# Patient Record
Sex: FEMALE | Race: WHITE | NOT HISPANIC OR LATINO | Employment: FULL TIME | URBAN - NONMETROPOLITAN AREA
[De-identification: names, ages, dates, MRNs, and addresses within clinical notes are randomized per-mention and may not be internally consistent; named-entity substitution may affect disease eponyms.]

---

## 2018-05-25 ENCOUNTER — OFFICE VISIT CONVERTED (OUTPATIENT)
Dept: FAMILY MEDICINE CLINIC | Facility: CLINIC | Age: 33
End: 2018-05-25
Attending: NURSE PRACTITIONER

## 2019-04-02 ENCOUNTER — HOSPITAL ENCOUNTER (OUTPATIENT)
Dept: GENERAL RADIOLOGY | Facility: HOSPITAL | Age: 34
Discharge: HOME | End: 2019-04-02

## 2019-04-23 ENCOUNTER — HOSPITAL ENCOUNTER (OUTPATIENT)
Dept: GENERAL RADIOLOGY | Facility: HOSPITAL | Age: 34
Discharge: HOME | End: 2019-04-23

## 2019-05-16 ENCOUNTER — OFFICE VISIT CONVERTED (OUTPATIENT)
Dept: GASTROENTEROLOGY | Facility: CLINIC | Age: 34
End: 2019-05-16
Attending: INTERNAL MEDICINE

## 2019-07-03 ENCOUNTER — HOSPITAL ENCOUNTER (OUTPATIENT)
Dept: GASTROENTEROLOGY | Facility: HOSPITAL | Age: 34
Setting detail: HOSPITAL OUTPATIENT SURGERY
Discharge: HOME | End: 2019-07-03
Attending: INTERNAL MEDICINE

## 2019-10-24 ENCOUNTER — HOSPITAL ENCOUNTER (OUTPATIENT)
Dept: URGENT CARE | Facility: CLINIC | Age: 34
Discharge: HOME | End: 2019-10-24
Attending: NURSE PRACTITIONER

## 2020-05-13 ENCOUNTER — TELEMEDICINE CONVERTED (OUTPATIENT)
Dept: GASTROENTEROLOGY | Facility: CLINIC | Age: 35
End: 2020-05-13
Attending: NURSE PRACTITIONER

## 2020-11-11 ENCOUNTER — HOSPITAL ENCOUNTER (OUTPATIENT)
Dept: GENERAL RADIOLOGY | Facility: HOSPITAL | Age: 35
Discharge: HOME | End: 2020-11-11
Attending: PODIATRIST

## 2020-11-12 ENCOUNTER — OFFICE VISIT CONVERTED (OUTPATIENT)
Dept: PODIATRY | Facility: CLINIC | Age: 35
End: 2020-11-12
Attending: PODIATRIST

## 2021-01-27 ENCOUNTER — OFFICE VISIT CONVERTED (OUTPATIENT)
Dept: FAMILY MEDICINE CLINIC | Facility: CLINIC | Age: 36
End: 2021-01-27
Attending: FAMILY MEDICINE

## 2021-03-01 ENCOUNTER — OFFICE VISIT CONVERTED (OUTPATIENT)
Dept: GASTROENTEROLOGY | Facility: CLINIC | Age: 36
End: 2021-03-01
Attending: NURSE PRACTITIONER

## 2021-03-09 ENCOUNTER — HOSPITAL ENCOUNTER (OUTPATIENT)
Dept: OTHER | Facility: HOSPITAL | Age: 36
Discharge: HOME | End: 2021-03-09
Attending: NURSE PRACTITIONER

## 2021-05-10 NOTE — H&P
History and Physical      Patient Name: Neha May   Patient ID: [id number]   Sex: Female   YOB: 1985    Primary Care Provider: No PCP No PCP Other   Referring Provider: Shabbir Restrepo DPM    Visit Date: November 12, 2020    Provider: Shabbir Restrepo DPM   Location: [facility]   Location Address: [address] [place] [address]  [id number]   Location Phone: [phone]          Chief Complaint  · Left Foot Pain      History Of Present Illness  Neha May is a 35 year old /White female who presents to the [facility] today new patient      New, Established, New Problem:  new  Location:  Left heel  Duration:    Onset:  gradual  Nature:  achy, sharp, shooting  Stable, worsening, improving:  worsening  Aggravating factors:    Patient describes morning Left heel pain as stabbing, burning, or aching. This pain usually subsides throughout the day, however it returns afterperiods of rest andsitting, when standing back up on their feet,and again the next morning.    Previous Treatment:  RICE    Patient denies any fevers, chills, nausea, vomiting, shortness of breathe, nor any other constitutional signs nor symptoms.       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); Working         Review of Systems  · Constitutional  o Denies  o : fatigue, night sweats  · Eyes  o Denies  o : double vision, blurred vision  · HENT  o Denies  o : vertigo, recent head injury  · Cardiovascular  o Denies  o : chest pain, irregular heart beats  · Respiratory  o Denies  o : shortness of breath, productive  "cough  · Gastrointestinal  o Denies  o : nausea, vomiting  · Genitourinary  o Denies  o : dysuria, urinary retention  · Integument  o Denies  o : hair growth change, new skin lesions  · Neurologic  o Denies  o : altered mental status, seizures  · Musculoskeletal  o * See HPI  · Endocrine  o Denies  o : cold intolerance, heat intolerance  · Heme-Lymph  o Denies  o : petechiae, lymph node enlargement or tenderness  · Allergic-Immunologic  o Denies  o : frequent illnesses      Vitals  Date Time BP Position Site L\R Cuff Size HR RR TEMP (F) WT  HT  BMI kg/m2 BSA m2 O2 Sat FR L/min FiO2 HC       11/12/2020 07:49 /82 Sitting    79 - R  97.8 193lbs 16oz 4'  11\" 39.18 1.91 99 %            Physical Examination  · Constitutional  o Appearance  o : Awake, alert, well developed, well nourished and well groomed  · Cardiovascular  o Peripheral Vascular System  o :   § Pedal Pulses  § : Pedal Pulses are +2 and symmetrical   § Extremities  § : No edema of the lower extremities  · Skin and Subcutaneous Tissue  o General Inspection  o : Skin is noted to have normal texture and turgor, with no excresences noted.  o Digits and Nails  o : The tonails are noted to be without disease.  · Neurologic  o Sensation  o : Epicritic sensations intact bilaterally.  · Left Ankle/Foot  o Inspection  o : Positive tenderness to palpaiton to the medial tubercle of the calcaneus. No signs of edema, erythema, lymphangitis, nor signs of infection.     Dr. Restrepo reviewed radiographs and results from [facility] and discussed them with the patient.  These are significant for small Inferior calcaneal spurring seen.           Assessment  · Foot pain, left     729.5/M79.672  · Plantar fascial fibromatosis of left foot     728.71/M72.2      Plan  · Medications  o diclofenac sodium 75 mg oral tablet,delayed release (DR/EC)   SIG: take 1 tablet (75 mg) by oral route 2 times per day for 30 days   DISP: (60) Tablet with 1 refills  Prescribed on " 11/12/2020     o Medrol (Chato) 4 mg oral tablets,dose pack   SIG: take as directed for 6 days   DISP: (1) Package with 0 refills  Prescribed on 11/12/2020     o Medications have been Reconciled  o Transition of Care or Provider Policy  · Instructions  o Handouts provided regarding Planter Fascitis.  o Overview: This patient has a plantar fasciitis.  o Discuss Findings: I have discussed the findings of this evaluation with the patient. The discussion included a complete verbal explanation of any changes in the examination results, diagnosis, and the current treatment plan. A schedule for future care needs was explained. If any questions should arise after returning home, I have encouraged the patient to feel free to contact Dr. Restrepo. The patient states understanding and agreement with this plan.  o Monitor For Problems: Pt to monitor for problems and to contact Dr. Restrepo for follow-up should such signs occur. Patient states understanding and agreement with this plan.  o Spenco: Recommend OTC Spenco Orthotics.  o Treatement Alternatives: Nonsurgical treatments almost always improve the pain. Treatment can last from several months to 2 years before symptoms get better. Most patients feel better in 9 months. Rarely Some people need surgery to relieve the pain.  o Conservative Treatment Recommendations: Anti-inflammatory medication to reduce pain and inflammation, Heel stretching exercises,   o Follow Up in 2 weeks.  o Rice Therapy: It is important to treat any injury as soon as possible to help control swelling and increase recovery time. The recognized regimen for immediate treatment of sport injuries includes rest, ice (cold application), compression, and elevation (RICE). Remove the injured athlete from play, apply ice to the affected area, wrap or compress the injured area with an elastic bandage when appropriate, and elevate the injured area above heart level to reduce swelling. The patient is to not use ice  for longer than 20 minutes at a time, with at least 20 minutes of no ice usage between applications.   o Electronically Identified Patient Education Materials Provided Electronically  · Disposition  o Call or Return if symptoms worsen or persist.            Electronically Signed by: Shabbir Restrepo DPM -Author on November 13, 2020 08:18:19 AM

## 2021-05-10 NOTE — H&P
History and Physical      Patient Name: Neha May   Patient ID: [id number]   Sex: Female   YOB: 1985    Primary Care Provider: No PCP No PCP Other   Referring Provider: Shabbir Restrepo DPM    Visit Date: January 27, 2021    Provider: Elsa Casey DO   Location: [facility]   Location Address: [address] [place] [address]  [id number]   Location Phone: [phone]          Chief Complaint  · Est care       History Of Present Illness  Neha May is a 35 year old /White female who presents for evaluation and treatment of:      She is here today to establish relations as a new patient. She does not have a past PCP. She is  and has two children. She a PMH significant for GERD and vitamin D deficiency. She has had an esophageal stricture and has had her throat streatched. Lion Florentino. Colon CA is in the family.     She has never had a Mammogram.  She has never had a Colonoscopy.    She has lost 8 lbs in the last month.     She has no other issues and denies CP, SOB, weakness, numbness, N/V/D, dizziness or syncope.       Past Medical History  Disease Name Date Onset Notes   Foot pain, left --  --    Heel pain --  --    Numbness in feet --  --          Past Surgical History  Procedure Name Date Notes   Caesarean section 2008 --          Medication List  Name Date Started Instructions   phentermine 15 mg oral capsule  take 1 capsule bid   Vitamin D2 1,250 mcg (50,000 unit) oral capsule  take 1 capsule by oral route weekly         Allergy List  Allergen Name Date Reaction Notes   NO KNOWN DRUG ALLERGIES --  --  --          Family Medical History  Disease Name Relative/Age Notes   Family history of colon cancer Grandmother (maternal)/45s   --          Social History  Finding Status Start/Stop Quantity Notes   *Denies Alcohol Use --  --/-- --  --    Alcohol Never --/-- --  --    Exercises regularly --  --/-- --  --    Lives with --  --/-- --   and  "two children    lives with spouse --  --/-- --  --     --  --/-- --  --    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 --/-- --  --    Working --  --/-- --  --          Immunizations  NameDate Admin Mfg Trade Name Lot Number Route Inj VIS Given VIS Publication   InfluenzaRefused 01/27/2021 NE Not Entered  NE NE     Comments: Patient declined         Review of Systems  · Constitutional  o * See HPI  · HENT  o * See HPI  · Cardiovascular  o * See HPI  · Respiratory  o * See HPI  · Gastrointestinal  o * See HPI  · Genitourinary  o * See HPI  · Musculoskeletal  o * See HPI  · Endocrine  o * See HPI  · Psychiatric  o * See HPI  · Heme-Lymph  o * See HPI      Vitals  Date Time BP Position Site L\R Cuff Size HR RR TEMP (F) WT  HT  BMI kg/m2 BSA m2 O2 Sat FR L/min FiO2 HC       01/27/2021 08:49 /95 Sitting    84 - R 18 97.1 189lbs 6oz 4'  11\" 38.25 1.89 99 %  21%    01/27/2021 08:54 /93 Sitting                       Physical Examination  · Constitutional  o Appearance  o : obese, well developed, alert, no obvious deformities present, NAD  · Head and Face  o Head  o :   § Inspection  § : atraumatic, normocephalic  · Ears, Nose, Mouth and Throat  o Ears  o :   § External Ears  § : normal  o Nose  o :   § Intranasal Exam  § : nares patent  o Oral Cavity  o :   § Oral Mucosa  § : moist mucous membranes  · Respiratory  o Respiratory Effort  o : breathing comfortably, symmetric chest rise  o Auscultation of Lungs  o : clear to asculatation bilaterally, no wheezes, rales, or rhonchii  · Cardiovascular  o Heart  o :   § Auscultation of Heart  § : regular rate and rhythm, no murmurs, rubs, or gallops  o Peripheral Vascular System  o :   § Extremities  § : no edema  · Skin and Subcutaneous Tissue  o General Inspection  o : no rashes present, no lesions present, no areas of discoloration, skin turgor normal, texture normal  · Neurologic  o Mental Status Examination  o :   § Orientation  § : grossly oriented to person, " place and time  o Cranial Nerves  o : cranial nerves intact bilaterally  o Gait and Station  o :   § Gait Screening  § : normal gait  · Psychiatric  o General  o : normal mood and affect              Assessment  · GERD (gastroesophageal reflux disease)     530.81/K21.9  Stable at this time.  · Screening for depression     V79.0/Z13.89  · Adult BMI 38.0-38.9 kg/sq m     V85.38/Z68.38  Diet, weight loss and exercise were highly encouraged today's visit.  · Elevated blood pressure reading in office without diagnosis of hypertension     796.2/R03.0  The patient was encouraged to continue dieting, cut back on her salt and lose weight. She was told to check her blood pressures at home and keep a log to bring back to her next appointment.      Plan  · Orders  o ACO-18: Negative screen for clinical depression using a standardized tool () - - 01/27/2021  o ACO-14: Influenza immunization was not administered for reasons documented [facility] () - - 01/27/2021  o ACO-39: Current medications updated and reviewed (1159F, ) - - 01/27/2021  · Medications  o Medications have been Reconciled  o Transition of Care or Provider Policy  · Instructions  o Depression Screen completed and scanned into the EMR under the designated folder within the patient's documents.  o Today's PHQ-9 result is _0__  o Patient was educated/instructed on their diagnosis, treatment and medications prior to discharge from the clinic today.  · Disposition  o Follow up in 6 months            Electronically Signed by: Elsa Casey DO - on February 4, 2021 07:49:10 AM

## 2021-05-13 ENCOUNTER — HOSPITAL ENCOUNTER (OUTPATIENT)
Dept: GASTROENTEROLOGY | Facility: HOSPITAL | Age: 36
Setting detail: HOSPITAL OUTPATIENT SURGERY
Discharge: HOME | End: 2021-05-13
Attending: INTERNAL MEDICINE

## 2021-05-13 NOTE — PROGRESS NOTES
"   Progress Note      Patient Name: Neha May   Patient ID: [id number]   Sex: Female   YOB: 1985    Primary Care Provider: Radha JOHNSTON   Referring Provider: Radha JOHNSTON    Visit Date: May 13, 2020    Provider: VICKIE Jones   Location: [facility]   Location Address: [address] [place] [address], [address]  [place], [place]  [id number]   Location Phone: [phone]          Chief Complaint  · Follow up Dysphagia       History Of Present Illness  Video Conferencing Visit  Neha May is a 34 year old /White female who is presenting for evaluation via video conferencing. Verbal consent obtained before beginning visit.   The following staff were present during this visit: Cecilia Gary; Conchita Felder      Patient feels that her dysphagia is getting worse. Upper esophageal dysphagia happens mainly with solids however has also occurred with liquids. She is unable to finish a meal anymore without getting food stuck. Admits to food becoming \"stuck\" twice since EGD and she had to throw up in order to dislodge it. Epigastric burning and reflux is also present and worse at night. Drinks around 1 cup of caffeine daily. Denies nausea, change in appetite, or weight loss. Takes ibuprofen frequently for headaches and any type of pain. She feels that she may be \"addicted\" to it.     EGD 7/3/2019: Normal mucosa of the duodenum and whole stomach.  Hiatal hernia.  Stricture in the GE junction that was dilated with a 15 to 18 mm balloon and 16.5 mm was largest diameter use.  Grade a esophagitis in the GE junction.  Gastric antrum biopsyslight chronic inflammation.  GE junction biopsymild chronic inflammation reflux esophagitis.  Recommend CT the chest without contrast to evaluate extrinsic compression of esophagus.    CT of the chest with contrast 7/4/2019: Study is negative for pulmonary embolism.  No acute intrathoracic abnormality.         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); Working         Review of Systems  · Constitutional  o Denies  o : chills, fever  · Cardiovascular  o Denies  o : chest pain, dyspnea on exertion  · Respiratory  o Denies  o : cough, shortness of breath  · Gastrointestinal  o Admits  o : see HPI   · Endocrine  o Denies  o : weight gain, weight loss      Vitals  Date Time BP Position Site L\R Cuff Size HR RR TEMP (F) WT  HT  BMI kg/m2 BSA m2 O2 Sat HC       05/13/2020 11:05 AM         180lbs 0oz 4'  11\" 36.36 1.84           Physical Examination  · Constitutional  o Appearance  o : Healthy-appearing, awake and alert in no acute distress  · Head and Face  o Head  o : Normocephalic with no worriesome skin lesions  · Eyes  o Vision  o :   § Visual Fields  § : eyes move symmetrical in all directions  o Sclerae  o : sclerae anicteric  o Pupils and Irises  o : pupils equal and symmetrical  · Respiratory  o Respiratory Effort  o : Breathing is unlabored.  o Inspection of Chest  o : normal appearance  · Skin and Subcutaneous Tissue  o General Inspection  o : no lesions present, no areas of discoloration.  · Psychiatric  o General  o : Alert and oriented x3  o Mood and Affect  o : Mood and affect are appropriate to circumstances          Assessment  · Pharyngoesophageal dysphagia     787.24/R13.14  · Gastroesophageal Reflux     530.81/K21.9  · Epigastric burning sensation     789.06/R10.13  · NSAID long-term use     V58.64/Z79.1    Problems Reconciled  Plan  · Orders  o Consent for Esophagogastroduodenoscopy (EGD) with dilation - Possible risks/complications, benefits, and " alternatives to surgical or invasive procedure have been explained to patient and/or legal guardian. - Patient has been evaluated and can tolerate anesthesia and/or sedation. Risks, benefits, and alternatives to anesthesia and sedation have been explained to patient and/or legal guardian. (74742) - - 05/13/2020  · Medications  o omeprazole 40 mg oral capsule,delayed release(DR/EC)   SIG: take 1 capsule (40 mg) by oral route once daily before breakfast   DISP: (90) capsules with 0 refills  Prescribed on 05/13/2020     o Medications have been Reconciled  o Transition of Care or Provider Policy  · Instructions  o PLAN: Proceed with procedure. Patient understands risks and benefits and is willing to proceed. Understands the risks include, but are not limited to, bleeding and/or perforation.  o Information given on current diagnoses.  o Lifestyle modifications discussed.  o Discussed risks of long-term PPI use.  o Educated patient to go to the ER if unable to dislodge food or swallow saliva.  o Electronically Identified Patient Education Materials Provided Electronically  · Disposition  o Follow up after procedure            Electronically Signed by: VICKIE Jones -Author on May 13, 2020 11:35:34 AM

## 2021-05-14 VITALS
BODY MASS INDEX: 39.11 KG/M2 | WEIGHT: 194 LBS | HEART RATE: 79 BPM | HEIGHT: 59 IN | TEMPERATURE: 97.8 F | OXYGEN SATURATION: 99 % | SYSTOLIC BLOOD PRESSURE: 122 MMHG | DIASTOLIC BLOOD PRESSURE: 82 MMHG

## 2021-05-14 VITALS
DIASTOLIC BLOOD PRESSURE: 95 MMHG | WEIGHT: 189.37 LBS | RESPIRATION RATE: 18 BRPM | SYSTOLIC BLOOD PRESSURE: 140 MMHG | HEIGHT: 59 IN | HEART RATE: 84 BPM | OXYGEN SATURATION: 99 % | TEMPERATURE: 97.1 F | BODY MASS INDEX: 38.18 KG/M2

## 2021-05-14 VITALS
DIASTOLIC BLOOD PRESSURE: 81 MMHG | HEART RATE: 75 BPM | WEIGHT: 188.25 LBS | SYSTOLIC BLOOD PRESSURE: 136 MMHG | HEIGHT: 59 IN | BODY MASS INDEX: 37.95 KG/M2 | TEMPERATURE: 96.6 F

## 2021-05-14 NOTE — PROGRESS NOTES
Progress Note      Patient Name: Neha May   Patient ID: [id number]   Sex: Female   YOB: 1985    Primary Care Provider: No PCP No PCP Other   Referring Provider: Shabbir Restrepo DPM    Visit Date: March 1, 2021    Provider: VICKIE Jones   Location: [facility]   Location Address: [address] [place] [address]  [address]  [place], [place]  [id number]          Chief Complaint  · Follow up Dysphagia       History Of Present Illness     Ms. May reports that she is having episodes of lower abdominal cramping. During these times she try's to have to have a bowel movement however only passes bright red blood from rectum. Ibuprofen does decrease the cramps. Total of 3 episodes of bleeding since last visit. LLQ pain waxes and wanes, passes after several minutes.  Increased flatulence that is worse after sitting for long periods of time. Having a bowel movement once daily, formed stool. Denies any rectal straining, rectal pain, or melena.     Still having pharyngeal dysphagia with solids and worse with meat. Occasionally food will get stuck and she will have to vomit in order to dislodge. Feels she may need to be stretched again. Occasional heartburn however admits she forgets to take her PPI most days. She denies any nausea, vomiting, frequent NSAID usage, change in appetite, or weight loss.    FMH: Maternal Grandmother- Colon cancer-dx age 50.      EGD 7/3/2019: Normal mucosa of the duodenum and whole stomach.  Hiatal hernia.  Stricture in the GE junction that was dilated with a 15 to 18 mm balloon and 16.5 mm was largest diameter use.  Grade a esophagitis in the GE junction.  Gastric antrum biopsy-slight chronic inflammation.  GE junction biopsy-mild chronic inflammation reflux esophagitis.  Recommend CT the chest without contrast to evaluate extrinsic compression of esophagus.    CT of the chest with contrast 7/4/2019: Study is negative for pulmonary embolism.  No acute  "intrathoracic abnormality.       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); Working         Immunizations  Name Date Admin   Influenza Refused         Review of Systems  · Constitutional  o Denies  o : chills, fever  · Cardiovascular  o Denies  o : chest pain, dyspnea on exertion  · Respiratory  o Denies  o : cough, shortness of breath  · Gastrointestinal  o Admits  o : see HPI   · Endocrine  o Denies  o : weight gain, weight loss      Vitals  Date Time BP Position Site L\R Cuff Size HR RR TEMP (F) WT  HT  BMI kg/m2 BSA m2 O2 Sat FR L/min FiO2 HC       03/01/2021 02:23 /81 Sitting    75 - R  96.6 188lbs 4oz 4'  11\" 38.02 1.89             Physical Examination  · Constitutional  o Appearance  o : Healthy-appearing, awake and alert in no acute distress  · Head and Face  o Head  o : Normocephalic with no worriesome skin lesions  · Eyes  o Vision  o :   § Visual Fields  § : eyes move symmetrical in all directions  o Sclerae  o : sclerae anicteric  o Pupils and Irises  o : pupils equal and symmetrical  · Neck  o Inspection/Palpation  o : Trachea is midline, no adenopathy  · Respiratory  o Respiratory Effort  o : Breathing is unlabored.  o Inspection of Chest  o : normal appearance  o Auscultation of Lungs  o : Chest is clear to auscultation bilaterally.  · Cardiovascular  o Heart  o :   § Auscultation of Heart  § : no murmurs, rubs, or gallops  o Peripheral Vascular System  o :   § Extremities  § : no cyanosis, " clubbing or edema;   · Gastrointestinal  o Abdominal Examination  o : Abdomen is soft, nontender to palpation, with normal active bowel sounds, no appreciable hepatosplenomegaly.  o Digital Rectal Exam  o : deferred  · Skin and Subcutaneous Tissue  o General Inspection  o : without focal lesions; turgor is normal  · Psychiatric  o General  o : Alert and oriented x3  o Mood and Affect  o : Mood and affect are appropriate to circumstances          Assessment  · Abdominal Pain, LLQ     789.04/R10.32  · Dysphagia     787.20/R13.10  · Rectal Bleeding     569.3/K62.5      Plan  · Orders  o Consent for Colonoscopy with Possible Biopsy - Possible risks/complications, benefits, and alternatives to surgical or invasive procedure have been explained to patient and/or legal guardian. -Patient has been evaluated and can tolerate anesthesia and/or sedation. Risks, benefits, and alternatives to anesthesia and sedation have been explained to patient and/or legal guardian. ([id number]) - - 03/01/2021  o Consent for Esophagogastroduodenoscopy (EGD) with dilation - Possible risks/complications, benefits, and alternatives to surgical or invasive procedure have been explained to patient and/or legal guardian. - Patient has been evaluated and can tolerate anesthesia and/or sedation. Risks, benefits, and alternatives to anesthesia and sedation have been explained to patient and/or legal guardian. (53725) - - 03/01/2021  o CT Abdomen and Pelvis with IV Contrast [facility]; suggest Oral Prep ([id number]) - - 03/01/2021  · Medications  o Medications have been Reconciled  o Transition of Care or Provider Policy  · Instructions  o Handouts provided: Pre-procedure instructions including date and time and location of procedure.  o PLAN: Proceed with procedure. Patient understands risks and benefits and is willing to proceed. Understands the risks include, but are not limited to, bleeding and/or perforation.  o Information given on current diagnoses.  o Lifestyle  modifications discussed.  o Discussed risks of long-term PPI use.  o Electronically Identified Patient Education Materials Provided Electronically  · Disposition  o Follow up after procedure            Electronically Signed by: VICKIE Jones -Author on March 1, 2021 02:56:40 PM

## 2021-05-15 VITALS
SYSTOLIC BLOOD PRESSURE: 136 MMHG | WEIGHT: 183.12 LBS | DIASTOLIC BLOOD PRESSURE: 79 MMHG | BODY MASS INDEX: 36.92 KG/M2 | HEIGHT: 59 IN

## 2021-05-15 VITALS — BODY MASS INDEX: 36.29 KG/M2 | WEIGHT: 180 LBS | HEIGHT: 59 IN

## 2021-05-16 VITALS
DIASTOLIC BLOOD PRESSURE: 76 MMHG | SYSTOLIC BLOOD PRESSURE: 122 MMHG | HEART RATE: 76 BPM | OXYGEN SATURATION: 97 % | WEIGHT: 181.12 LBS | BODY MASS INDEX: 36.51 KG/M2 | HEIGHT: 59 IN | RESPIRATION RATE: 20 BRPM | TEMPERATURE: 97.3 F

## 2021-08-02 ENCOUNTER — OFFICE VISIT (OUTPATIENT)
Dept: FAMILY MEDICINE CLINIC | Facility: CLINIC | Age: 36
End: 2021-08-02

## 2021-08-02 VITALS
SYSTOLIC BLOOD PRESSURE: 136 MMHG | TEMPERATURE: 98.1 F | HEIGHT: 59 IN | OXYGEN SATURATION: 99 % | WEIGHT: 189.2 LBS | HEART RATE: 65 BPM | BODY MASS INDEX: 38.14 KG/M2 | DIASTOLIC BLOOD PRESSURE: 90 MMHG

## 2021-08-02 DIAGNOSIS — R03.0 ELEVATED BLOOD-PRESSURE READING WITHOUT DIAGNOSIS OF HYPERTENSION: Primary | ICD-10-CM

## 2021-08-02 DIAGNOSIS — E66.09 CLASS 2 OBESITY DUE TO EXCESS CALORIES WITHOUT SERIOUS COMORBIDITY WITH BODY MASS INDEX (BMI) OF 38.0 TO 38.9 IN ADULT: ICD-10-CM

## 2021-08-02 PROBLEM — E66.812 CLASS 2 OBESITY DUE TO EXCESS CALORIES WITHOUT SERIOUS COMORBIDITY WITH BODY MASS INDEX (BMI) OF 38.0 TO 38.9 IN ADULT: Status: ACTIVE | Noted: 2021-08-02

## 2021-08-02 PROCEDURE — 99213 OFFICE O/P EST LOW 20 MIN: CPT | Performed by: FAMILY MEDICINE

## 2021-08-02 RX ORDER — OMEPRAZOLE 40 MG/1
40 CAPSULE, DELAYED RELEASE ORAL DAILY
COMMUNITY
End: 2021-08-12

## 2021-08-02 NOTE — ASSESSMENT & PLAN NOTE
The patient was educated about hypertension.  She was told that her blood pressure is elevated now for 2 consecutive visits.  It is hard to tell whether or not she has whitecoat syndrome at this time.  She was educated about some people are salt sensitive and her weight is making an influence on her blood pressure.  She was encouraged to lose 40 pounds in the next 12 months.    The patient was educated about the need to check blood pressure at least twice per day and bring a log to their next appointment. They were educated about HTN crisis and the symptoms to look for. They were told to call 911 if they developed these symptoms.

## 2021-08-02 NOTE — PROGRESS NOTES
"Chief Complaint  Follow-up (Vit D, Labs ) and Med Refill (Wants to restart Phentermine and refill omeprazole )    Subjective          Neha May presents to [facility]  She is here today for a follow-up for the management of her chronic medical conditions. She is  and has two children and one granddaughter. She a PMH significant for GERD, esophageal stricture, obesity and vitamin D deficiency. She has had an esophageal stricture and has had her throat streatched. Lion Florentino. Colon CA is in the family.     The patient's weight is stable from 6 months ago.  She has no complaints today.  She was given a wt. Loss goal of 10 lbs in three months.    At her last visit her blood pressure was elevated approximately 135/90.  She does not check her blood pressure at home.    The patient has no complaints today and denies chest pain, shortness of breath, weakness, numbness, nausea, vomiting, diarrhea, dizziness or syncopal event.        Objective   Vital Signs:   /90 (BP Location: Left arm, Patient Position: Sitting)   Pulse 65   Temp 98.1 °F (36.7 °C) (Infrared)   Ht 149.9 cm (59\")   Wt 85.8 kg (189 lb 3.2 oz)   SpO2 99%   BMI 38.21 kg/m²     Physical Exam  Vitals reviewed.   Constitutional:       Appearance: Normal appearance. She is well-developed. She is obese.   HENT:      Head: Normocephalic and atraumatic.      Right Ear: External ear normal.      Left Ear: External ear normal.      Mouth/Throat:      Pharynx: No oropharyngeal exudate.   Eyes:      Conjunctiva/sclera: Conjunctivae normal.      Pupils: Pupils are equal, round, and reactive to light.   Neck:      Vascular: No carotid bruit.   Cardiovascular:      Rate and Rhythm: Normal rate and regular rhythm.      Heart sounds: No murmur heard.   No friction rub. No gallop.    Pulmonary:      Effort: Pulmonary effort is normal.      Breath sounds: Normal breath sounds. No wheezing or rhonchi.   Abdominal: "      General: Bowel sounds are normal. There is no distension.      Palpations: Abdomen is soft.      Tenderness: There is no abdominal tenderness.   Skin:     General: Skin is warm and dry.   Neurological:      Mental Status: She is alert and oriented to person, place, and time.      Cranial Nerves: No cranial nerve deficit.      Motor: No weakness.   Psychiatric:         Mood and Affect: Mood and affect normal.         Behavior: Behavior normal.         Thought Content: Thought content normal.         Judgment: Judgment normal.        Result Review :                 Assessment and Plan    Diagnoses and all orders for this visit:    1. Elevated blood-pressure reading without diagnosis of hypertension (Primary)  Assessment & Plan:  The patient was educated about hypertension.  She was told that her blood pressure is elevated now for 2 consecutive visits.  It is hard to tell whether or not she has whitecoat syndrome at this time.  She was educated about some people are salt sensitive and her weight is making an influence on her blood pressure.  She was encouraged to lose 40 pounds in the next 12 months.    The patient was educated about the need to check blood pressure at least twice per day and bring a log to their next appointment. They were educated about HTN crisis and the symptoms to look for. They were told to call 911 if they developed these symptoms.         2. Class 2 obesity due to excess calories without serious comorbidity with body mass index (BMI) of 38.0 to 38.9 in adult  Assessment & Plan:  Diet, exercise and weight loss were highly encouraged today's visit.  The patient was given weight loss goal of 40 pounds next 12 months.  She was rescheduled for a follow-up appointment in 3 months with a weight loss goal of 10 pound.        Follow Up   Return in about 3 months (around 11/2/2021).  Patient was given instructions and counseling regarding her condition or for health maintenance advice. Please see  specific information pulled into the AVS if appropriate.

## 2021-08-02 NOTE — ASSESSMENT & PLAN NOTE
Diet, exercise and weight loss were highly encouraged today's visit.  The patient was given weight loss goal of 40 pounds next 12 months.  She was rescheduled for a follow-up appointment in 3 months with a weight loss goal of 10 pound.

## 2022-01-14 ENCOUNTER — OFFICE VISIT (OUTPATIENT)
Dept: FAMILY MEDICINE CLINIC | Facility: CLINIC | Age: 37
End: 2022-01-14

## 2022-01-14 VITALS
SYSTOLIC BLOOD PRESSURE: 129 MMHG | OXYGEN SATURATION: 93 % | DIASTOLIC BLOOD PRESSURE: 98 MMHG | BODY MASS INDEX: 38.73 KG/M2 | HEIGHT: 59 IN | HEART RATE: 87 BPM | TEMPERATURE: 98.9 F | WEIGHT: 192.1 LBS

## 2022-01-14 DIAGNOSIS — Z00.00 ANNUAL PHYSICAL EXAM: Primary | ICD-10-CM

## 2022-01-14 DIAGNOSIS — Z13.220 SCREENING FOR LIPID DISORDERS: ICD-10-CM

## 2022-01-14 DIAGNOSIS — E55.9 VITAMIN D DEFICIENCY: ICD-10-CM

## 2022-01-14 DIAGNOSIS — K21.9 GASTROESOPHAGEAL REFLUX DISEASE WITHOUT ESOPHAGITIS: Chronic | ICD-10-CM

## 2022-01-14 DIAGNOSIS — E66.09 CLASS 2 OBESITY DUE TO EXCESS CALORIES WITHOUT SERIOUS COMORBIDITY WITH BODY MASS INDEX (BMI) OF 38.0 TO 38.9 IN ADULT: Chronic | ICD-10-CM

## 2022-01-14 DIAGNOSIS — R03.0 ELEVATED BLOOD-PRESSURE READING WITHOUT DIAGNOSIS OF HYPERTENSION: Chronic | ICD-10-CM

## 2022-01-14 DIAGNOSIS — U07.1 COVID-19 VIRUS INFECTION: ICD-10-CM

## 2022-01-14 PROBLEM — E66.812 CLASS 2 OBESITY DUE TO EXCESS CALORIES WITHOUT SERIOUS COMORBIDITY WITH BODY MASS INDEX (BMI) OF 38.0 TO 38.9 IN ADULT: Chronic | Status: ACTIVE | Noted: 2021-08-02

## 2022-01-14 PROCEDURE — 99395 PREV VISIT EST AGE 18-39: CPT | Performed by: FAMILY MEDICINE

## 2022-01-14 PROCEDURE — 99214 OFFICE O/P EST MOD 30 MIN: CPT | Performed by: FAMILY MEDICINE

## 2022-01-14 RX ORDER — PANTOPRAZOLE SODIUM 20 MG/1
20 TABLET, DELAYED RELEASE ORAL DAILY
COMMUNITY
End: 2022-01-27 | Stop reason: SDUPTHER

## 2022-01-14 RX ORDER — OMEPRAZOLE 40 MG/1
40 CAPSULE, DELAYED RELEASE ORAL DAILY
COMMUNITY
End: 2022-04-26

## 2022-01-14 NOTE — PROGRESS NOTES
"Chief Complaint  Cough, Loss Of Smell, Loss Of Taste, and listen to lungs    Subjective          Neha May presents to [facility]  She is here today for a follow-up for the management of her chronic medical conditions and for an acute visit. She is  and has two children and one granddaughter. She a PMH significant for GERD, esophageal stricture, obesity and vitamin D deficiency. She has had an esophageal stricture and has had her throat streatched. Lion Florentino. Colon CA is in the family.      She was diagnosed with COVID-19 4 days ago.  She has been having symptoms for about 5 days.  The patient is complained today of having a nonproductive cough.  She has a reduction in her sense of taste and smell but denies losing it completely.  She denies feeling short of breath.  She denies fever but has had some body aches.     She does not check her blood pressure at home.     The patient has no complaints today and denies chest pain, weakness, numbness, nausea, vomiting, diarrhea, dizziness or syncopal event.          Objective   Vital Signs:   /98 (BP Location: Left arm, Patient Position: Sitting, Cuff Size: Large Adult)   Pulse 87   Temp 98.9 °F (37.2 °C) (Temporal)   Ht 149.9 cm (59.02\")   Wt 87.1 kg (192 lb 1.6 oz)   SpO2 93%   BMI 38.78 kg/m²     Physical Exam  Vitals reviewed.   Constitutional:       Appearance: Normal appearance. She is well-developed. She is obese.   HENT:      Head: Normocephalic and atraumatic.      Right Ear: External ear normal.      Left Ear: External ear normal.      Mouth/Throat:      Pharynx: No oropharyngeal exudate.   Eyes:      Conjunctiva/sclera: Conjunctivae normal.      Pupils: Pupils are equal, round, and reactive to light.   Neck:      Vascular: No carotid bruit.   Cardiovascular:      Rate and Rhythm: Normal rate and regular rhythm.      Heart sounds: No murmur heard.  No friction rub. No gallop.    Pulmonary:      " Effort: Pulmonary effort is normal.      Breath sounds: Normal breath sounds. No wheezing or rhonchi.   Abdominal:      General: There is no distension.   Skin:     General: Skin is warm and dry.   Neurological:      Mental Status: She is alert and oriented to person, place, and time.      Cranial Nerves: No cranial nerve deficit.      Motor: No weakness.   Psychiatric:         Mood and Affect: Mood and affect normal.         Behavior: Behavior normal.         Thought Content: Thought content normal.         Judgment: Judgment normal.        Result Review :                               Assessment and Plan    Diagnoses and all orders for this visit:    1. Annual physical exam (Primary)  Assessment & Plan:  The patient was educated about the need to and counseled about weight loss goals.  She counseled about hypertension and how her blood pressure has been elevated now the last 3 visits.  At her last visit she had a weight loss goal of 10 pounds set.  She was reminded of that and encouraged to get back on her diet.  Screening labs were ordered today to be managed according to findings.    Orders:  -     Comprehensive Metabolic Panel; Future  -     CBC & Differential; Future  -     TSH; Future    2. Vitamin D deficiency  Assessment & Plan:  The patient has been on vitamin D in the past.  She has not had her vitamin D levels checked for several months.  She was given lab order today for a vitamin D level and once completed we will manage it according to findings.    Orders:  -     Vitamin D 25 hydroxy; Future    3. Gastroesophageal reflux disease without esophagitis  Assessment & Plan:  The patient was educated about the risks of not treating esophageal reflux.  Due to her history of esophageal stricture it was felt warranted to start the patient on omeprazole 40 mg daily.  She was told at that is not enough then to start back on pantoprazole in the evening and take the omeprazole in the morning.  We will revisit her  GERD in 3 months at her next visit.      4. Class 2 obesity due to excess calories without serious comorbidity with body mass index (BMI) of 38.0 to 38.9 in adult  Assessment & Plan:  Patient's (Body mass index is 38.78 kg/m².) indicates that they are morbidly obese (BMI > 40 or > 35 with obesity - related health condition) with health conditions that include none . Weight is worsening. BMI is is above average; BMI management plan is completed. We discussed low calorie, low carb based diet program, portion control and increasing exercise.       5. Elevated blood-pressure reading without diagnosis of hypertension  Assessment & Plan:  The patient's blood pressures been elevated now at the last 3 visits.  She was educated about hypertension and how she needs to be watching her blood pressure at home.  She was encouraged to lose weight.  At her next visit in 3 months we will revisit her blood pressure but still elevated we will start antihypertensive medication.      6. COVID-19 virus infection  Assessment & Plan:  The patient's COVID-19 infection is mild.  She was encouraged to take over-the-counter 50 mg of zinc daily.  She was encouraged to start taking 50,000 international units of vitamin D daily.  She was told to take Tylenol for fever and body aches but not ibuprofen.  She was told for symptoms worsen to call back or go to the emergency room.      7. Screening for lipid disorders  -     Lipid Panel; Future      Follow Up   Return in about 3 months (around 4/14/2022), or if symptoms worsen or fail to improve.  Patient was given instructions and counseling regarding her condition or for health maintenance advice. Please see specific information pulled into the AVS if appropriate.

## 2022-01-15 NOTE — ASSESSMENT & PLAN NOTE
The patient has been on vitamin D in the past.  She has not had her vitamin D levels checked for several months.  She was given lab order today for a vitamin D level and once completed we will manage it according to findings.

## 2022-01-15 NOTE — ASSESSMENT & PLAN NOTE
The patient's blood pressures been elevated now at the last 3 visits.  She was educated about hypertension and how she needs to be watching her blood pressure at home.  She was encouraged to lose weight.  At her next visit in 3 months we will revisit her blood pressure but still elevated we will start antihypertensive medication.

## 2022-01-15 NOTE — ASSESSMENT & PLAN NOTE
The patient was educated about the risks of not treating esophageal reflux.  Due to her history of esophageal stricture it was felt warranted to start the patient on omeprazole 40 mg daily.  She was told at that is not enough then to start back on pantoprazole in the evening and take the omeprazole in the morning.  We will revisit her GERD in 3 months at her next visit.

## 2022-01-15 NOTE — ASSESSMENT & PLAN NOTE
The patient was educated about the need to and counseled about weight loss goals.  She counseled about hypertension and how her blood pressure has been elevated now the last 3 visits.  At her last visit she had a weight loss goal of 10 pounds set.  She was reminded of that and encouraged to get back on her diet.  Screening labs were ordered today to be managed according to findings.

## 2022-01-15 NOTE — ASSESSMENT & PLAN NOTE
The patient's COVID-19 infection is mild.  She was encouraged to take over-the-counter 50 mg of zinc daily.  She was encouraged to start taking 50,000 international units of vitamin D daily.  She was told to take Tylenol for fever and body aches but not ibuprofen.  She was told for symptoms worsen to call back or go to the emergency room.

## 2022-01-15 NOTE — ASSESSMENT & PLAN NOTE
Patient's (Body mass index is 38.78 kg/m².) indicates that they are morbidly obese (BMI > 40 or > 35 with obesity - related health condition) with health conditions that include none . Weight is worsening. BMI is is above average; BMI management plan is completed. We discussed low calorie, low carb based diet program, portion control and increasing exercise.

## 2022-01-17 ENCOUNTER — LAB (OUTPATIENT)
Dept: LAB | Facility: HOSPITAL | Age: 37
End: 2022-01-17

## 2022-01-17 DIAGNOSIS — Z13.220 SCREENING FOR LIPID DISORDERS: ICD-10-CM

## 2022-01-17 DIAGNOSIS — E55.9 VITAMIN D DEFICIENCY: ICD-10-CM

## 2022-01-17 DIAGNOSIS — Z00.00 ANNUAL PHYSICAL EXAM: ICD-10-CM

## 2022-01-17 LAB
25(OH)D3 SERPL-MCNC: 26.8 NG/ML
ALBUMIN SERPL-MCNC: 4.3 G/DL (ref 3.5–5.2)
ALBUMIN/GLOB SERPL: 1.5 G/DL
ALP SERPL-CCNC: 65 U/L (ref 39–117)
ALT SERPL W P-5'-P-CCNC: 14 U/L (ref 1–33)
ANION GAP SERPL CALCULATED.3IONS-SCNC: 7.7 MMOL/L (ref 5–15)
AST SERPL-CCNC: 16 U/L (ref 1–32)
BASOPHILS # BLD AUTO: 0.04 10*3/MM3 (ref 0–0.2)
BASOPHILS NFR BLD AUTO: 0.5 % (ref 0–1.5)
BILIRUB SERPL-MCNC: 0.4 MG/DL (ref 0–1.2)
BUN SERPL-MCNC: 12 MG/DL (ref 6–20)
BUN/CREAT SERPL: 15 (ref 7–25)
CALCIUM SPEC-SCNC: 9.8 MG/DL (ref 8.6–10.5)
CHLORIDE SERPL-SCNC: 102 MMOL/L (ref 98–107)
CHOLEST SERPL-MCNC: 163 MG/DL (ref 0–200)
CO2 SERPL-SCNC: 26.3 MMOL/L (ref 22–29)
CREAT SERPL-MCNC: 0.8 MG/DL (ref 0.57–1)
DEPRECATED RDW RBC AUTO: 39.6 FL (ref 37–54)
EOSINOPHIL # BLD AUTO: 0.21 10*3/MM3 (ref 0–0.4)
EOSINOPHIL NFR BLD AUTO: 2.8 % (ref 0.3–6.2)
ERYTHROCYTE [DISTWIDTH] IN BLOOD BY AUTOMATED COUNT: 11.9 % (ref 12.3–15.4)
GFR SERPL CREATININE-BSD FRML MDRD: 81 ML/MIN/1.73
GLOBULIN UR ELPH-MCNC: 2.9 GM/DL
GLUCOSE SERPL-MCNC: 94 MG/DL (ref 65–99)
HCT VFR BLD AUTO: 45.2 % (ref 34–46.6)
HDLC SERPL-MCNC: 51 MG/DL (ref 40–60)
HGB BLD-MCNC: 14.9 G/DL (ref 12–15.9)
IMM GRANULOCYTES # BLD AUTO: 0.02 10*3/MM3 (ref 0–0.05)
IMM GRANULOCYTES NFR BLD AUTO: 0.3 % (ref 0–0.5)
LDLC SERPL CALC-MCNC: 98 MG/DL (ref 0–100)
LDLC/HDLC SERPL: 1.91 {RATIO}
LYMPHOCYTES # BLD AUTO: 1.92 10*3/MM3 (ref 0.7–3.1)
LYMPHOCYTES NFR BLD AUTO: 25.8 % (ref 19.6–45.3)
MCH RBC QN AUTO: 30.1 PG (ref 26.6–33)
MCHC RBC AUTO-ENTMCNC: 33 G/DL (ref 31.5–35.7)
MCV RBC AUTO: 91.3 FL (ref 79–97)
MONOCYTES # BLD AUTO: 0.49 10*3/MM3 (ref 0.1–0.9)
MONOCYTES NFR BLD AUTO: 6.6 % (ref 5–12)
NEUTROPHILS NFR BLD AUTO: 4.77 10*3/MM3 (ref 1.7–7)
NEUTROPHILS NFR BLD AUTO: 64 % (ref 42.7–76)
NRBC BLD AUTO-RTO: 0 /100 WBC (ref 0–0.2)
PLATELET # BLD AUTO: 281 10*3/MM3 (ref 140–450)
PMV BLD AUTO: 10.6 FL (ref 6–12)
POTASSIUM SERPL-SCNC: 4.6 MMOL/L (ref 3.5–5.2)
PROT SERPL-MCNC: 7.2 G/DL (ref 6–8.5)
RBC # BLD AUTO: 4.95 10*6/MM3 (ref 3.77–5.28)
SODIUM SERPL-SCNC: 136 MMOL/L (ref 136–145)
TRIGL SERPL-MCNC: 72 MG/DL (ref 0–150)
TSH SERPL DL<=0.05 MIU/L-ACNC: 1.8 UIU/ML (ref 0.27–4.2)
VLDLC SERPL-MCNC: 14 MG/DL (ref 5–40)
WBC NRBC COR # BLD: 7.45 10*3/MM3 (ref 3.4–10.8)

## 2022-01-17 PROCEDURE — 80061 LIPID PANEL: CPT

## 2022-01-17 PROCEDURE — 80050 GENERAL HEALTH PANEL: CPT

## 2022-01-17 PROCEDURE — 36415 COLL VENOUS BLD VENIPUNCTURE: CPT

## 2022-01-17 PROCEDURE — 82306 VITAMIN D 25 HYDROXY: CPT

## 2022-01-18 ENCOUNTER — HOSPITAL ENCOUNTER (EMERGENCY)
Facility: HOSPITAL | Age: 37
Discharge: HOME | End: 2022-01-18
Attending: EMERGENCY MEDICINE | Admitting: EMERGENCY MEDICINE

## 2022-01-18 ENCOUNTER — APPOINTMENT (OUTPATIENT)
Dept: GENERAL RADIOLOGY | Facility: HOSPITAL | Age: 37
End: 2022-01-18

## 2022-01-18 VITALS
RESPIRATION RATE: 18 BRPM | HEART RATE: 74 BPM | WEIGHT: 193.56 LBS | OXYGEN SATURATION: 98 % | SYSTOLIC BLOOD PRESSURE: 128 MMHG | HEIGHT: 59 IN | TEMPERATURE: 98.5 F | DIASTOLIC BLOOD PRESSURE: 81 MMHG | BODY MASS INDEX: 39.02 KG/M2

## 2022-01-18 DIAGNOSIS — K21.00 GASTROESOPHAGEAL REFLUX DISEASE WITH ESOPHAGITIS WITHOUT HEMORRHAGE: Primary | ICD-10-CM

## 2022-01-18 LAB
ALBUMIN SERPL-MCNC: 4.4 G/DL (ref 3.5–5.2)
ALBUMIN/GLOB SERPL: 1.4 G/DL
ALP SERPL-CCNC: 69 U/L (ref 39–117)
ALT SERPL W P-5'-P-CCNC: 16 U/L (ref 1–33)
ANION GAP SERPL CALCULATED.3IONS-SCNC: 10.3 MMOL/L (ref 5–15)
AST SERPL-CCNC: 17 U/L (ref 1–32)
BASOPHILS # BLD AUTO: 0.05 10*3/MM3 (ref 0–0.2)
BASOPHILS NFR BLD AUTO: 0.7 % (ref 0–1.5)
BILIRUB SERPL-MCNC: 0.3 MG/DL (ref 0–1.2)
BUN SERPL-MCNC: 12 MG/DL (ref 6–20)
BUN/CREAT SERPL: 15.4 (ref 7–25)
CALCIUM SPEC-SCNC: 9.5 MG/DL (ref 8.6–10.5)
CHLORIDE SERPL-SCNC: 102 MMOL/L (ref 98–107)
CK MB SERPL-CCNC: <1 NG/ML
CK SERPL-CCNC: 73 U/L (ref 20–180)
CO2 SERPL-SCNC: 23.7 MMOL/L (ref 22–29)
CREAT SERPL-MCNC: 0.78 MG/DL (ref 0.57–1)
DEPRECATED RDW RBC AUTO: 38.7 FL (ref 37–54)
EOSINOPHIL # BLD AUTO: 0.1 10*3/MM3 (ref 0–0.4)
EOSINOPHIL NFR BLD AUTO: 1.4 % (ref 0.3–6.2)
ERYTHROCYTE [DISTWIDTH] IN BLOOD BY AUTOMATED COUNT: 11.9 % (ref 12.3–15.4)
GFR SERPL CREATININE-BSD FRML MDRD: 83 ML/MIN/1.73
GLOBULIN UR ELPH-MCNC: 3.2 GM/DL
GLUCOSE SERPL-MCNC: 88 MG/DL (ref 65–99)
HCT VFR BLD AUTO: 43 % (ref 34–46.6)
HGB BLD-MCNC: 14.7 G/DL (ref 12–15.9)
HOLD SPECIMEN: NORMAL
IMM GRANULOCYTES # BLD AUTO: 0.02 10*3/MM3 (ref 0–0.05)
IMM GRANULOCYTES NFR BLD AUTO: 0.3 % (ref 0–0.5)
LIPASE SERPL-CCNC: 30 U/L (ref 13–60)
LYMPHOCYTES # BLD AUTO: 1.71 10*3/MM3 (ref 0.7–3.1)
LYMPHOCYTES NFR BLD AUTO: 23.6 % (ref 19.6–45.3)
MAGNESIUM SERPL-MCNC: 2 MG/DL (ref 1.6–2.6)
MCH RBC QN AUTO: 30.6 PG (ref 26.6–33)
MCHC RBC AUTO-ENTMCNC: 34.2 G/DL (ref 31.5–35.7)
MCV RBC AUTO: 89.4 FL (ref 79–97)
MONOCYTES # BLD AUTO: 0.47 10*3/MM3 (ref 0.1–0.9)
MONOCYTES NFR BLD AUTO: 6.5 % (ref 5–12)
NEUTROPHILS NFR BLD AUTO: 4.91 10*3/MM3 (ref 1.7–7)
NEUTROPHILS NFR BLD AUTO: 67.5 % (ref 42.7–76)
NRBC BLD AUTO-RTO: 0 /100 WBC (ref 0–0.2)
NT-PROBNP SERPL-MCNC: 55.4 PG/ML (ref 0–450)
PLATELET # BLD AUTO: 314 10*3/MM3 (ref 140–450)
PMV BLD AUTO: 10 FL (ref 6–12)
POTASSIUM SERPL-SCNC: 3.9 MMOL/L (ref 3.5–5.2)
PROT SERPL-MCNC: 7.6 G/DL (ref 6–8.5)
QT INTERVAL: 389 MS
RBC # BLD AUTO: 4.81 10*6/MM3 (ref 3.77–5.28)
SODIUM SERPL-SCNC: 136 MMOL/L (ref 136–145)
TROPONIN I SERPL-MCNC: 0 NG/ML (ref 0–0.6)
WBC NRBC COR # BLD: 7.26 10*3/MM3 (ref 3.4–10.8)
WHOLE BLOOD HOLD SPECIMEN: NORMAL
WHOLE BLOOD HOLD SPECIMEN: NORMAL

## 2022-01-18 PROCEDURE — 99283 EMERGENCY DEPT VISIT LOW MDM: CPT

## 2022-01-18 PROCEDURE — 80053 COMPREHEN METABOLIC PANEL: CPT

## 2022-01-18 PROCEDURE — 93005 ELECTROCARDIOGRAM TRACING: CPT | Performed by: EMERGENCY MEDICINE

## 2022-01-18 PROCEDURE — 83690 ASSAY OF LIPASE: CPT

## 2022-01-18 PROCEDURE — 71045 X-RAY EXAM CHEST 1 VIEW: CPT

## 2022-01-18 PROCEDURE — 82553 CREATINE MB FRACTION: CPT

## 2022-01-18 PROCEDURE — 85025 COMPLETE CBC W/AUTO DIFF WBC: CPT

## 2022-01-18 PROCEDURE — 83880 ASSAY OF NATRIURETIC PEPTIDE: CPT

## 2022-01-18 PROCEDURE — 83735 ASSAY OF MAGNESIUM: CPT

## 2022-01-18 PROCEDURE — 36415 COLL VENOUS BLD VENIPUNCTURE: CPT

## 2022-01-18 PROCEDURE — 93005 ELECTROCARDIOGRAM TRACING: CPT

## 2022-01-18 PROCEDURE — 82550 ASSAY OF CK (CPK): CPT

## 2022-01-18 PROCEDURE — 84484 ASSAY OF TROPONIN QUANT: CPT

## 2022-01-18 RX ORDER — SUCRALFATE 1 G/1
1 TABLET ORAL 4 TIMES DAILY
Qty: 28 TABLET | Refills: 0 | Status: SHIPPED | OUTPATIENT
Start: 2022-01-18 | End: 2022-05-22

## 2022-01-18 RX ORDER — ASPIRIN 81 MG/1
324 TABLET, CHEWABLE ORAL ONCE
Status: DISCONTINUED | OUTPATIENT
Start: 2022-01-18 | End: 2022-01-18 | Stop reason: HOSPADM

## 2022-01-18 RX ORDER — SODIUM CHLORIDE 0.9 % (FLUSH) 0.9 %
10 SYRINGE (ML) INJECTION AS NEEDED
Status: DISCONTINUED | OUTPATIENT
Start: 2022-01-18 | End: 2022-01-18 | Stop reason: HOSPADM

## 2022-01-18 RX ORDER — ERGOCALCIFEROL 1.25 MG/1
50000 CAPSULE ORAL WEEKLY
Qty: 12 CAPSULE | Refills: 1 | Status: SHIPPED | OUTPATIENT
Start: 2022-01-18 | End: 2022-01-26

## 2022-01-18 NOTE — DISCHARGE INSTRUCTIONS
Return to the emergency department as needed for worsening of symptoms.  Eat a bland diet and please resume taking your GI meds as prescribed.

## 2022-01-18 NOTE — ED PROVIDER NOTES
"Time: 6:10 PM EST  Arrived by: private car  Chief Complaint: Chest pain/epigastric pain  History provided by: Patient  History is limited by: N/A     History of Present Illness:  Patient is a 36 y.o. year old female that presents to the emergency department with 3 to 4 weeks of chest/epigastric \"discomfort\".  Patient's recently had COVID last week but states that she barely had any symptoms with it.  At worst a mild cough and some chills but never febrile and no nausea/vomiting or diarrhea.  Patient is not short of breath.  Most of her discomfort that she complains of in the last 3 to 4 weeks is related to food intake.  She \"is super gassy\".  She supposed to be on multiple GI meds but states she is never really takes them.    HPI    Similar Symptoms Previously: Yes  Recently seen: No      Patient Care Team  Primary Care Provider: Elsa Casey DO    Past Medical History:     No Known Allergies  Past Medical History:   Diagnosis Date   • Foot pain, left    • Heel pain    • Numbness in feet      Past Surgical History:   Procedure Laterality Date   •  SECTION       Family History   Problem Relation Age of Onset   • Colon cancer Maternal Grandmother         45s       Home Medications:  Prior to Admission medications    Medication Sig Start Date End Date Taking? Authorizing Provider   omeprazole (priLOSEC) 40 MG capsule Take 40 mg by mouth Daily.    Provider, MD Jocelyn   pantoprazole (PROTONIX) 20 MG EC tablet Take 20 mg by mouth Daily.    Provider, MD Jocelyn   vitamin D (ERGOCALCIFEROL) 1.25 MG (07677 UT) capsule capsule Take 1 capsule by mouth 1 (One) Time Per Week. 22   Elsa Casey DO        Social History:   Social History     Tobacco Use   • Smoking status: Never Smoker   • Smokeless tobacco: Never Used   Vaping Use   • Vaping Use: Never used   Substance Use Topics   • Alcohol use: Never   • Drug use: Not on file     Recent travel: not applicable     Review of Systems:  Review of " "Systems   Constitutional: Negative for chills and fever.   HENT: Negative for congestion, rhinorrhea and sore throat.    Eyes: Negative for pain and visual disturbance.   Respiratory: Positive for chest tightness. Negative for apnea, cough and shortness of breath.    Cardiovascular: Positive for chest pain. Negative for palpitations.   Gastrointestinal: Positive for abdominal pain. Negative for diarrhea, nausea and vomiting.   Genitourinary: Negative for difficulty urinating and dysuria.   Musculoskeletal: Negative for joint swelling and myalgias.   Skin: Negative for color change.   Neurological: Negative for seizures and headaches.   Psychiatric/Behavioral: Negative.    All other systems reviewed and are negative.       Physical Exam:  /81   Pulse 74   Temp 98.5 °F (36.9 °C) (Oral)   Resp 18   Ht 149.9 cm (59\")   Wt 87.8 kg (193 lb 9 oz)   LMP 12/27/2021   SpO2 98%   BMI 39.10 kg/m²     Physical Exam  Vitals and nursing note reviewed.   Constitutional:       Appearance: Normal appearance. She is well-developed. She is not ill-appearing.   HENT:      Head: Normocephalic and atraumatic.      Nose: Nose normal.      Mouth/Throat:      Mouth: Mucous membranes are dry.   Eyes:      Extraocular Movements: Extraocular movements intact.      Pupils: Pupils are equal, round, and reactive to light.   Cardiovascular:      Rate and Rhythm: Normal rate and regular rhythm.      Heart sounds: Normal heart sounds.   Pulmonary:      Effort: Pulmonary effort is normal.      Breath sounds: Normal breath sounds.   Abdominal:      General: Bowel sounds are normal.      Palpations: Abdomen is soft.      Tenderness: There is no abdominal tenderness.   Musculoskeletal:         General: No swelling. Normal range of motion.      Cervical back: Normal range of motion and neck supple.   Skin:     General: Skin is warm and dry.      Coloration: Skin is not jaundiced.   Neurological:      General: No focal deficit present.      " Mental Status: She is alert and oriented to person, place, and time. Mental status is at baseline.   Psychiatric:         Mood and Affect: Mood normal.         Behavior: Behavior normal.         Judgment: Judgment normal.                Medications in the Emergency Department:  Medications   sodium chloride 0.9 % flush 10 mL (has no administration in time range)   aspirin chewable tablet 324 mg (has no administration in time range)        Labs  Lab Results (last 24 hours)     Procedure Component Value Units Date/Time    POC Troponin I with Hold Tube [[id number]] Collected: 01/18/22 1533    Specimen: Blood Updated: 01/18/22 1650    Narrative:      The following orders were created for panel order POC Troponin I with Hold Tube.  Procedure                               Abnormality         Status                     ---------                               -----------         ------                     POC Troponin I[[id number]]                                                              HOLD Troponin-I Tube[[id number]]                             Final result                 Please view results for these tests on the individual orders.    CBC & Differential [[id number]]  (Abnormal) Collected: 01/18/22 1533    Specimen: Blood from Arm, Right Updated: 01/18/22 1609    Narrative:      The following orders were created for panel order CBC & Differential.  Procedure                               Abnormality         Status                     ---------                               -----------         ------                     CBC Auto Differential[[id number]]        Abnormal            Final result                 Please view results for these tests on the individual orders.    Comprehensive Metabolic Panel [[id number]] Collected: 01/18/22 1533    Specimen: Blood from Arm, Right Updated: 01/18/22 1640     Glucose 88 mg/dL      BUN 12 mg/dL      Creatinine 0.78 mg/dL      Sodium 136 mmol/L      Potassium 3.9 mmol/L      Chloride  102 mmol/L      CO2 23.7 mmol/L      Calcium 9.5 mg/dL      Total Protein 7.6 g/dL      Albumin 4.40 g/dL      ALT (SGPT) 16 U/L      AST (SGOT) 17 U/L      Alkaline Phosphatase 69 U/L      Total Bilirubin 0.3 mg/dL      eGFR Non African Amer 83 mL/min/1.73      Globulin 3.2 gm/dL      A/G Ratio 1.4 g/dL      BUN/Creatinine Ratio 15.4     Anion Gap 10.3 mmol/L     Narrative:      GFR Normal >60  Chronic Kidney Disease <60  Kidney Failure <15      Lipase [[id number]]  (Normal) Collected: 01/18/22 1533    Specimen: Blood from Arm, Right Updated: 01/18/22 1640     Lipase 30 U/L     BNP [[id number]]  (Normal) Collected: 01/18/22 1533    Specimen: Blood from Arm, Right Updated: 01/18/22 1632     proBNP 55.4 pg/mL     Narrative:      Among patients with dyspnea, NT-proBNP is highly sensitive for the detection of acute congestive heart failure. In addition NT-proBNP of <300 pg/ml effectively rules out acute congestive heart failure with 99% negative predictive value.    Results may be falsely decreased if patient taking Biotin.      Magnesium [[id number]]  (Normal) Collected: 01/18/22 1533    Specimen: Blood from Arm, Right Updated: 01/18/22 1640     Magnesium 2.0 mg/dL     CK Total & CKMB [[id number]]  (Normal) Collected: 01/18/22 1533    Specimen: Blood from Arm, Right Updated: 01/18/22 1643     CKMB <1.00 ng/mL      Creatine Kinase 73 U/L     Narrative:      CKMB results may be falsely decreased if patient taking Biotin.    CBC Auto Differential [[id number]]  (Abnormal) Collected: 01/18/22 1533    Specimen: Blood from Arm, Right Updated: 01/18/22 1609     WBC 7.26 10*3/mm3      RBC 4.81 10*6/mm3      Hemoglobin 14.7 g/dL      Hematocrit 43.0 %      MCV 89.4 fL      MCH 30.6 pg      MCHC 34.2 g/dL      RDW 11.9 %      RDW-SD 38.7 fl      MPV 10.0 fL      Platelets 314 10*3/mm3      Neutrophil % 67.5 %      Lymphocyte % 23.6 %      Monocyte % 6.5 %      Eosinophil % 1.4 %      Basophil % 0.7 %      Immature Grans % 0.3 %       Neutrophils, Absolute 4.91 10*3/mm3      Lymphocytes, Absolute 1.71 10*3/mm3      Monocytes, Absolute 0.47 10*3/mm3      Eosinophils, Absolute 0.10 10*3/mm3      Basophils, Absolute 0.05 10*3/mm3      Immature Grans, Absolute 0.02 10*3/mm3      nRBC 0.0 /100 WBC     POC Troponin I [[id number]]  (Normal) Collected: 01/18/22 1623    Specimen: Blood Updated: 01/18/22 1635     Troponin I 0.00 ng/mL      Comment: Serial Number: [id number]:  [id number]              Imaging:  XR Chest 1 View    Result Date: 1/18/2022  PROCEDURE: XR CHEST 1 VW  COMPARISON: [facility], CT, CHEST W/ CONTRAST, 7/04/2019, 2:26.  [facility], CT, ABDOMEN/PELVIS WITH CONTRAST, 3/09/2021, 15:55.  [facility], CR, CHEST AP/PA 1 VIEW, 7/04/2019, 1:22.  INDICATIONS: Chest Pain  FINDINGS:  Lordotic positioning.  Overlying artifacts.  Low lung volumes.  Ill-defined bibasilar opacities.  No pneumothorax.  Cardiomediastinal contour is within normal limits.  No acute osseous abnormality is identified.       Low lung volumes with ill-defined bibasilar opacities that could represent atelectasis and/or pneumonia.       MAGALIE AUSTIN MD       Electronically Signed and Approved By: MAGALIE AUSTIN MD on 1/18/2022 at 18:04               Procedures:  Procedures    Progress  ED Course as of 01/18/22 1945   Tue Jan 18, 2022   1806 Pain-free in the emergency department. [RP]      ED Course User Index  [RP] Dandy Prado MD           HEART Score: 1                  Medical Decision Making:  MDM  Number of Diagnoses or Management Options     Amount and/or Complexity of Data Reviewed  Clinical lab tests: reviewed  Tests in the medicine section of CPT®: reviewed  Independent visualization of images, tracings, or specimens: yes    Risk of Complications, Morbidity, and/or Mortality  Presenting problems: moderate  Management options: low    Patient Progress  Patient progress: improved       Final diagnoses:   Gastroesophageal  reflux disease with esophagitis without hemorrhage        Disposition:  ED Disposition     ED Disposition Condition Comment    Discharge Stable           This medical record created using voice recognition software and may contain unintended errors.         Dandy Prado MD  01/18/22 1945

## 2022-01-20 ENCOUNTER — OFFICE VISIT (OUTPATIENT)
Dept: GASTROENTEROLOGY | Facility: CLINIC | Age: 37
End: 2022-01-20

## 2022-01-20 ENCOUNTER — LAB (OUTPATIENT)
Dept: LAB | Facility: HOSPITAL | Age: 37
End: 2022-01-20

## 2022-01-20 VITALS
SYSTOLIC BLOOD PRESSURE: 146 MMHG | DIASTOLIC BLOOD PRESSURE: 84 MMHG | WEIGHT: 195 LBS | BODY MASS INDEX: 39.31 KG/M2 | HEIGHT: 59 IN | HEART RATE: 75 BPM

## 2022-01-20 DIAGNOSIS — K21.00 GASTROESOPHAGEAL REFLUX DISEASE WITH ESOPHAGITIS WITHOUT HEMORRHAGE: ICD-10-CM

## 2022-01-20 DIAGNOSIS — R10.11 RIGHT UPPER QUADRANT ABDOMINAL PAIN: ICD-10-CM

## 2022-01-20 DIAGNOSIS — E55.9 VITAMIN D DEFICIENCY: ICD-10-CM

## 2022-01-20 DIAGNOSIS — K44.9 HIATAL HERNIA: ICD-10-CM

## 2022-01-20 DIAGNOSIS — R10.11 RIGHT UPPER QUADRANT ABDOMINAL PAIN: Primary | ICD-10-CM

## 2022-01-20 DIAGNOSIS — K20.0 ESOPHAGITIS, EOSINOPHILIC: ICD-10-CM

## 2022-01-20 LAB — 25(OH)D3 SERPL-MCNC: 24.1 NG/ML (ref 30–100)

## 2022-01-20 PROCEDURE — 86003 ALLG SPEC IGE CRUDE XTRC EA: CPT

## 2022-01-20 PROCEDURE — 99214 OFFICE O/P EST MOD 30 MIN: CPT | Performed by: NURSE PRACTITIONER

## 2022-01-20 PROCEDURE — 36415 COLL VENOUS BLD VENIPUNCTURE: CPT

## 2022-01-20 PROCEDURE — 82306 VITAMIN D 25 HYDROXY: CPT

## 2022-01-20 NOTE — PROGRESS NOTES
Chief Complaint  Follow-up (dysphagia)    History of Present Illness  Neha May is a 36 y.o. who presents to [facility] for follow up of Follow-up (dysphagia).    Ms. May presents today with complaints of right upper quadrant pain that radiates to her back.  Pain is also been present in her shoulder blades.  She is suspicious this her gallbladder is not functioning correctly.  Like to get testing done.  Last EGD did show reflux esophagitis and eosinophilic esophagitis.  Patient admits she has bad at taking medication however has recently started taking her Protonix 20 mg once daily.  Still having the right upper quadrant pain, mainly after meals.  Reports she had a taco salad today for lunch and the pain was excruciating.  Denies any nausea, vomiting, dysphagia, change in appetite, or weight loss.    Labs Result Review Imaging    Past Medical History:   Diagnosis Date   • Foot pain, left    • Heel pain    • Numbness in feet        Past Surgical History:   Procedure Laterality Date   •  SECTION     • COLONOSCOPY     • UPPER GASTROINTESTINAL ENDOSCOPY         Current Outpatient Medications on File Prior to Visit   Medication Sig Dispense Refill   • omeprazole (priLOSEC) 40 MG capsule Take 40 mg by mouth Daily.     • pantoprazole (PROTONIX) 20 MG EC tablet Take 20 mg by mouth Daily.     • sucralfate (CARAFATE) 1 g tablet Take 1 tablet by mouth 4 (Four) Times a Day. 28 tablet 0   • vitamin D (ERGOCALCIFEROL) 1.25 MG (25800 UT) capsule capsule Take 1 capsule by mouth 1 (One) Time Per Week. 12 capsule 1     No current facility-administered medications on file prior to visit.       Social History     Social History Narrative   • Not on file         Objective     Review of Systems   Gastrointestinal: Positive for abdominal pain and GERD.        Vital Signs:   /84 (BP Location: Left arm, Patient Position: Sitting, Cuff Size: Adult)   Pulse 75   Ht 149.9 cm  "(59\")   Wt 88.5 kg (195 lb)   BMI 39.39 kg/m²       Physical Exam  Constitutional:       General: She is not in acute distress.     Appearance: Normal appearance. She is well-developed and normal weight.   HENT:      Head: Normocephalic and atraumatic.   Eyes:      Conjunctiva/sclera: Conjunctivae normal.      Pupils: Pupils are equal, round, and reactive to light.      Visual Fields: Right eye visual fields normal and left eye visual fields normal.   Cardiovascular:      Rate and Rhythm: Normal rate and regular rhythm.      Heart sounds: Normal heart sounds.   Pulmonary:      Effort: Pulmonary effort is normal. No retractions.      Breath sounds: Normal breath sounds and air entry.   Abdominal:      General: Bowel sounds are normal. There is no distension.      Palpations: Abdomen is soft.      Tenderness: There is abdominal tenderness in the right upper quadrant and epigastric area.      Comments: No appreciable hepatosplenomegaly or ascites   Musculoskeletal:         General: Normal range of motion.      Cervical back: Normal range of motion and neck supple.   Skin:     General: Skin is warm and dry.   Neurological:      Mental Status: She is alert and oriented to person, place, and time.   Psychiatric:         Mood and Affect: Mood and affect normal.         Behavior: Behavior normal.         Result Review :   The following data was reviewed by: VICKIE Jones on 01/20/2022:  CMP    CMP 1/17/22 1/18/22   Glucose 94 88   BUN 12 12   Creatinine 0.80 0.78   eGFR Non African Am 81 83   Sodium 136 136   Potassium 4.6 3.9   Chloride 102 102   Calcium 9.8 9.5   Albumin 4.30 4.40   Total Bilirubin 0.4 0.3   Alkaline Phosphatase 65 69   AST (SGOT) 16 17   ALT (SGPT) 14 16           CBC w/diff    CBC w/Diff 1/17/22 1/18/22   WBC 7.45 7.26   RBC 4.95 4.81   Hemoglobin 14.9 14.7   Hematocrit 45.2 43.0   MCV 91.3 89.4   MCH 30.1 30.6   MCHC 33.0 34.2   RDW 11.9 (A) 11.9 (A)   Platelets 281 314   Neutrophil Rel % " 64.0 67.5   Immature Granulocyte Rel % 0.3 0.3   Lymphocyte Rel % 25.8 23.6   Monocyte Rel % 6.6 6.5   Eosinophil Rel % 2.8 1.4   Basophil Rel % 0.5 0.7   (A) Abnormal value            No results found for: IRON, TIBC, FERRITIN, LABIRON      EGD 5/13/2021: Hiatal hernia.  Stricture in the GE junction that was dilated with a 15 to 18 mm balloon.  Erythema in duodenum and antrum.  Grade a esophagitis.  Linear furrows in the middle third of the esophagus compatible with esophagitis.  Colonoscopy 5/13/2021: 12 mm polyp in the rectum.  1 cm polyp in the transverse colon.  Grade 1 internal hemorrhoids.  Transverse colon polyp-hyperplastic polyp.  Rectal biopsy-tubulovillous adenoma.  Antrum biopsy-mild chronic inactive gastritis.  GE junction biopsy-squamous epithelium with increased intraepithelial eosinophils.  Midesophagus biopsy-no significant pathologic change.    CT of the abdomen and pelvis with contrast 3/9/2021: No evidence for acute abnormality.    EGD 7/3/2019: Normal mucosa of the duodenum and whole stomach.  Hiatal hernia.  Stricture in the GE junction that was dilated with a 15 to 18 mm balloon and 16.5 mm was largest diameter use.  Grade a esophagitis in the GE junction.  Gastric antrum biopsy-slight chronic inflammation.  GE junction biopsy-mild chronic inflammation reflux esophagitis.  Recommend CT the chest without contrast to evaluate extrinsic compression of esophagus.    CT of the chest with contrast 7/4/2019: Study is negative for pulmonary embolism.  No acute intrathoracic abnormality.     Assessment and Plan    Diagnoses and all orders for this visit:    1. Right upper quadrant abdominal pain (Primary)  -     Food Allergy Profile; Future  -     NM HIDA Scan With Pharmacological Intervention; Future  -     US Gallbladder; Future    2. Esophagitis, eosinophilic    3. Gastroesophageal reflux disease with esophagitis without hemorrhage    4. Hiatal hernia      * Surgery not found *     Colon recall  5/2024     Follow Up   Return in about 3 months (around 4/20/2022).  Patient was given instructions and counseling regarding her condition or for health maintenance advice. Please see specific information pulled into the AVS if appropriate.

## 2022-01-20 NOTE — PATIENT INSTRUCTIONS
Esophagitis    Esophagitis is inflammation of the esophagus. The esophagus is the tube that carries food from the mouth to the stomach. Esophagitis can cause soreness or pain in the esophagus. This condition can make it difficult and painful to swallow.  What are the causes?  Most causes of esophagitis are not serious. Common causes of this condition include:  · Gastroesophageal reflux disease (GERD). This is when stomach contents move back up into the esophagus (reflux).  · Repeated vomiting.  · An allergic reaction, especially caused by food allergies (eosinophilic esophagitis).  · Injury to the esophagus by swallowing large pills with or without water, or swallowing certain types of medicines.  · Swallowing harmful chemicals, such as household cleaning products.  · Drinking a lot of alcohol.  · An infection of the esophagus. This most often occurs in people who have a weakened immune system.  · Radiation or chemotherapy treatment for cancer.  · Certain diseases such as sarcoidosis, Crohn's disease, and scleroderma.  What are the signs or symptoms?  Symptoms of this condition include:  · Difficult or painful swallowing.  · Pain with swallowing acidic liquids, such as citrus juices. You may also have pain when you burp.  · Chest pain and difficulty breathing.  · Nausea and vomiting.  · Pain in the abdomen.  · Weight loss.  · Ulcers in the mouth and white patches in the mouth (candidiasis).  · Fever.  · Coughing up blood or vomiting blood.  · Stool that is black, tarry, or bright red.  How is this diagnosed?  This condition may be diagnosed based on your medical history and a physical exam. You may also have other tests, including:  · A test to examine your esophagus and stomach with a small flexible tube with a camera (endoscopy).  · A test that measures the acidity level in your esophagus.  · A test that measures how much pressure is on your esophagus.  · A barium swallow or modified barium swallow to show the  shape, size, and functioning of your esophagus.  · Allergy tests.  How is this treated?  Treatment for this condition depends on the cause of your esophagitis. In some cases, steroids or other medicines may be given to help relieve your symptoms or to treat the underlying cause of your condition. You may have to make some lifestyle changes, such as:  · Avoiding alcohol.  · Quitting any products that contain nicotine or tobacco. These products include cigarettes, chewing tobacco, and vaping devices, such as e-cigarettes. If you need help quitting, ask your health care provider.  · Changing your diet.  · Exercising.  · Changing your sleep habits and your sleep environment.  Follow these instructions at home:  Medicines  · Take over-the-counter and prescription medicines only as told by your health care provider.  · Do not take aspirin, ibuprofen, or other NSAIDs unless your health care provider told you to do so.  · If you have trouble taking pills:  ? Use a pill splitter to decrease the size of the pill. This will decrease the chance of the pill getting stuck or injuring your esophagus.  ? Drink water after you take a pill.  Eating and drinking    · Avoid foods and drinks that seem to make your symptoms worse.  · Follow a diet as recommended by your health care provider. This may involve avoiding foods and drinks such as:  ? Coffee and tea, with or without caffeine.  ? Drinks that contain alcohol.  ? Energy drinks and sports drinks.  ? Carbonated drinks or sodas.  ? Chocolate and cocoa.  ? Peppermint and mint flavorings.  ? Garlic and onions.  ? Horseradish.  ? Spicy and acidic foods, including peppers, chili powder, villalba powder, vinegar, hot sauces, and barbecue sauce.  ? Citrus fruit juices and citrus fruits, such as oranges, mayela, and limes.  ? Tomato-based foods, such as red sauce, chili, salsa, and pizza with red sauce.  ? Fried and fatty foods, such as donuts, french fries, potato chips, and high-fat  dressings.  ? High-fat meats, such as hot dogs and fatty cuts of red and white meats, such as rib eye steak, sausage, ham, and lilly.  ? High-fat dairy items, such as whole milk, butter, and cream cheese.    Lifestyle  · Eat small, frequent meals instead of large meals.  · Avoid drinking large amounts of liquid with your meals.  · Avoid eating meals during the 2-3 hours before bedtime.  · Avoid lying down right after you eat.  · Do not exercise right after you eat.  · Do not use any products that contain nicotine or tobacco. These products include cigarettes, chewing tobacco, and vaping devices, such as e-cigarettes. If you need help quitting, ask your health care provider.  General instructions    · Pay attention to any changes in your symptoms. Let your health care provider know about them.  · Wear loose-fitting clothing. Do not wear anything tight around your waist that causes pressure on your abdomen.  · Raise (elevate) the head of your bed about 6 inches (15 cm). You may need to use a wedge to do this.  · Try relaxation strategies such as yoga, deep breathing, or meditation to manage stress. If you need help reducing stress, ask your health care provider.  · If you are overweight, reduce your weight to an amount that is healthy for you. Ask your health care provider for guidance about a safe weight loss goal.  · Keep all follow-up visits. This is important.    Contact a health care provider if:  · You have new symptoms.  · You have unexplained weight loss.  · You have difficulty swallowing, or it hurts to swallow.  · You have wheezing or a cough that does not go away.  · Your symptoms do not improve with treatment.  · You have frequent heartburn for more than two weeks.  Get help right away if:  · You have sudden severe pain in your arms, neck, jaw, teeth, or back.  · You suddenly feel sweaty, dizzy, or light-headed.  · You have chest pain or shortness of breath.  · You vomit and the vomit is green, yellow, or  black, or it looks like blood or coffee grounds.  · Your stool is red, bloody, or black.  · You have a fever.  · You cannot swallow, drink, or eat.  These symptoms may represent a serious problem that is an emergency. Do not wait to see if the symptoms will go away. Get medical help right away. Call your local emergency services (911 in the [place] Do not drive yourself to the hospital.  Summary  · Esophagitis is inflammation of the esophagus.  · Most causes of esophagitis are not serious.  · Follow your health care provider's instructions about eating and drinking.  · Contact a health care provider if you have new symptoms, have weight loss, or coughing that does not stop.  · Get help right away if you have severe pain in the arms, neck, jaw, teeth, or back, or if you have chest pain, shortness of breath, or fever.  This information is not intended to replace advice given to you by your health care provider. Make sure you discuss any questions you have with your health care provider.  Document Revised: 06/28/2021 Document Reviewed: 06/28/2021  Elsevier Patient Education © 2021 [employer]

## 2022-01-26 LAB
CLAM IGE QN: <0.1 KU/L
CODFISH IGE QN: <0.1 KU/L
CONV CLASS DESCRIPTION: ABNORMAL
CORN IGE QN: 0.25 KU/L
COW MILK IGE QN: 0.38 KU/L
EGG WHITE IGE QN: 0.29 KU/L
PEANUT IGE QN: <0.1 KU/L
SCALLOP IGE QN: <0.1 KU/L
SESAME SEED IGE QN: <0.1 KU/L
SHRIMP IGE QN: <0.1 KU/L
SOYBEAN IGE QN: <0.1 KU/L
WALNUT IGE QN: 0.11 KU/L
WHEAT IGE QN: 1.87 KU/L

## 2022-01-26 RX ORDER — ERGOCALCIFEROL 1.25 MG/1
50000 CAPSULE ORAL WEEKLY
Qty: 12 CAPSULE | Refills: 0 | Status: SHIPPED | OUTPATIENT
Start: 2022-01-26 | End: 2022-08-11

## 2022-01-27 RX ORDER — PANTOPRAZOLE SODIUM 20 MG/1
20 TABLET, DELAYED RELEASE ORAL DAILY
Qty: 90 TABLET | Refills: 3 | Status: SHIPPED | OUTPATIENT
Start: 2022-01-27 | End: 2023-02-13

## 2022-02-02 ENCOUNTER — TELEPHONE (OUTPATIENT)
Dept: GASTROENTEROLOGY | Facility: CLINIC | Age: 37
End: 2022-02-02

## 2022-02-02 NOTE — TELEPHONE ENCOUNTER
Her wheat allergen was very low however we can test for celiac disease if she would like via blood draw. An EGD is more accurate for celiac disease and there was not any abnormalities of her duodenum on scope that showed signs of celiac

## 2022-02-02 NOTE — TELEPHONE ENCOUNTER
----- Message from VICKIE Jones sent at 1/31/2022  9:26 AM EST -----  This let patient know that she has a food allergen to egg whites, cows milk, walnut, wheat, and corn.

## 2022-02-02 NOTE — TELEPHONE ENCOUNTER
Patient was notified of her results. Patient wants to know if she should be tested for gluten as well since she is allergic to wheat. Please advise.

## 2022-02-23 ENCOUNTER — HOSPITAL ENCOUNTER (OUTPATIENT)
Dept: ULTRASOUND IMAGING | Facility: HOSPITAL | Age: 37
Discharge: HOME | End: 2022-02-23

## 2022-02-23 ENCOUNTER — HOSPITAL ENCOUNTER (OUTPATIENT)
Dept: NUCLEAR MEDICINE | Facility: HOSPITAL | Age: 37
Discharge: HOME | End: 2022-02-23

## 2022-02-23 DIAGNOSIS — R10.11 RIGHT UPPER QUADRANT ABDOMINAL PAIN: ICD-10-CM

## 2022-02-23 PROCEDURE — A9537 TC99M MEBROFENIN: HCPCS | Performed by: NURSE PRACTITIONER

## 2022-02-23 PROCEDURE — 0 TECHNETIUM TC 99M MEBROFENIN KIT: Performed by: NURSE PRACTITIONER

## 2022-02-23 PROCEDURE — 76705 ECHO EXAM OF ABDOMEN: CPT

## 2022-02-23 PROCEDURE — 78227 HEPATOBIL SYST IMAGE W/DRUG: CPT

## 2022-02-23 RX ORDER — KIT FOR THE PREPARATION OF TECHNETIUM TC 99M MEBROFENIN 45 MG/10ML
1 INJECTION, POWDER, LYOPHILIZED, FOR SOLUTION INTRAVENOUS
Status: COMPLETED | OUTPATIENT
Start: 2022-02-23 | End: 2022-02-23

## 2022-02-23 RX ADMIN — KIT FOR THE PREPARATION OF TECHNETIUM TC 99M MEBROFENIN 1 DOSE: 45 INJECTION, POWDER, LYOPHILIZED, FOR SOLUTION INTRAVENOUS at 10:19

## 2022-04-26 ENCOUNTER — TELEPHONE (OUTPATIENT)
Dept: GASTROENTEROLOGY | Facility: CLINIC | Age: 37
End: 2022-04-26

## 2022-04-26 RX ORDER — FAMOTIDINE 40 MG/1
40 TABLET, FILM COATED ORAL
Qty: 30 TABLET | Refills: 5 | Status: SHIPPED | OUTPATIENT
Start: 2022-04-26 | End: 2022-05-22

## 2022-04-26 NOTE — TELEPHONE ENCOUNTER
Patient called and reported that she had to miss her appointment yesterday. Patient is reporting that she was told to call if her medications was not working for her and patient reported that the pantoprazole is working for her however, it wears off in the evening and patient begins to have symptoms.  Please advise.

## 2022-04-26 NOTE — TELEPHONE ENCOUNTER
I see that patient has a follow-up scheduled.  In the meantime I am going to add famotidine 40 mg for patient to take before bedtime each night.  Continue Protonix 20 mg in the a.m. before breakfast.

## 2022-08-03 ENCOUNTER — HOSPITAL ENCOUNTER (OUTPATIENT)
Dept: GENERAL RADIOLOGY | Facility: HOSPITAL | Age: 37
Discharge: HOME | End: 2022-08-03
Admitting: NURSE PRACTITIONER

## 2022-08-03 ENCOUNTER — OFFICE VISIT (OUTPATIENT)
Dept: FAMILY MEDICINE CLINIC | Facility: CLINIC | Age: 37
End: 2022-08-03

## 2022-08-03 VITALS
BODY MASS INDEX: 39.31 KG/M2 | SYSTOLIC BLOOD PRESSURE: 132 MMHG | WEIGHT: 195 LBS | OXYGEN SATURATION: 99 % | TEMPERATURE: 98.6 F | RESPIRATION RATE: 18 BRPM | DIASTOLIC BLOOD PRESSURE: 86 MMHG | HEART RATE: 54 BPM | HEIGHT: 59 IN

## 2022-08-03 DIAGNOSIS — G89.29 CHRONIC PAIN OF LEFT KNEE: Primary | ICD-10-CM

## 2022-08-03 DIAGNOSIS — R03.0 ELEVATED BLOOD-PRESSURE READING WITHOUT DIAGNOSIS OF HYPERTENSION: Chronic | ICD-10-CM

## 2022-08-03 DIAGNOSIS — M25.562 CHRONIC PAIN OF LEFT KNEE: Primary | ICD-10-CM

## 2022-08-03 DIAGNOSIS — K21.9 GASTROESOPHAGEAL REFLUX DISEASE WITHOUT ESOPHAGITIS: Chronic | ICD-10-CM

## 2022-08-03 DIAGNOSIS — M25.562 CHRONIC PAIN OF LEFT KNEE: ICD-10-CM

## 2022-08-03 DIAGNOSIS — G89.29 CHRONIC PAIN OF LEFT KNEE: ICD-10-CM

## 2022-08-03 DIAGNOSIS — M79.605 LEFT LEG PAIN: ICD-10-CM

## 2022-08-03 PROBLEM — E66.9 OBESITY (BMI 30-39.9): Status: ACTIVE | Noted: 2021-08-02

## 2022-08-03 PROCEDURE — 99214 OFFICE O/P EST MOD 30 MIN: CPT | Performed by: NURSE PRACTITIONER

## 2022-08-03 PROCEDURE — 73562 X-RAY EXAM OF KNEE 3: CPT

## 2022-08-03 NOTE — ASSESSMENT & PLAN NOTE
Patient's (Body mass index is 39.39 kg/m².) indicates that they are obese (BMI >30) with health conditions that include none . Weight is unchanged. BMI is is above average; BMI management plan is completed. We discussed portion control and increasing exercise.

## 2022-08-03 NOTE — PROGRESS NOTES
"Chief Complaint  Leg Pain (Left leg x1 year ) and Knee Pain (Left leg )    Subjective        Neha May presents to [facility]  Pt presents with left leg pain, left knee pain ongoing > 1 year. States leg pain is diffuse, seems to be originating with knee and radiating to thigh and calf area. Denies any injuries or falls. No prior imaging or treatment. Denies fever, chills, warmth of extremity, pin point tenderness, erythema, edema, SOB, weakness or other. Takes ibuprofen to treat.     Reviewed all recent labs and medications.      Objective   Vital Signs:  /86   Pulse 54   Temp 98.6 °F (37 °C)   Resp 18   Ht 149.9 cm (59\")   Wt 88.5 kg (195 lb)   SpO2 99%   BMI 39.39 kg/m²   Estimated body mass index is 39.39 kg/m² as calculated from the following:    Height as of this encounter: 149.9 cm (59\").    Weight as of this encounter: 88.5 kg (195 lb).          Physical Exam  Vitals reviewed.   Constitutional:       General: She is not in acute distress.     Appearance: Normal appearance.   HENT:      Head: Normocephalic.      Right Ear: Tympanic membrane normal.      Left Ear: Tympanic membrane normal.      Nose: Nose normal.      Mouth/Throat:      Pharynx: Oropharynx is clear. No posterior oropharyngeal erythema.   Eyes:      General: No scleral icterus.     Extraocular Movements: Extraocular movements intact.      Conjunctiva/sclera: Conjunctivae normal.      Pupils: Pupils are equal, round, and reactive to light.   Cardiovascular:      Rate and Rhythm: Normal rate and regular rhythm.      Pulses: Normal pulses.      Heart sounds: Normal heart sounds.   Pulmonary:      Effort: Pulmonary effort is normal.      Breath sounds: Normal breath sounds.   Abdominal:      General: Bowel sounds are normal.      Palpations: Abdomen is soft.   Musculoskeletal:         General: Normal range of motion.      Cervical back: Neck supple.      Left upper leg: Tenderness present. No " swelling, edema, deformity, lacerations or bony tenderness.      Left knee: No swelling, deformity, effusion or erythema. Tenderness present.      Left lower leg: No swelling. No edema.   Skin:     General: Skin is warm and dry.   Neurological:      Mental Status: She is alert and oriented to person, place, and time.   Psychiatric:         Mood and Affect: Mood normal.         Behavior: Behavior normal.         Thought Content: Thought content normal.         Judgment: Judgment normal.        Result Review :    Common labs    Common Labsle 1/17/22 1/17/22 1/17/22 1/18/22 1/18/22    1118 1118 1118 1533 1533   Glucose   94  88   BUN   12  12   Creatinine   0.80  0.78   eGFR Non African Am   81  83   Sodium   136  136   Potassium   4.6  3.9   Chloride   102  102   Calcium   9.8  9.5   Albumin   4.30  4.40   Total Bilirubin   0.4  0.3   Alkaline Phosphatase   65  69   AST (SGOT)   16  17   ALT (SGPT)   14  16   WBC 7.45   7.26    Hemoglobin 14.9   14.7    Hematocrit 45.2   43.0    Platelets 281   314    Total Cholesterol  163      Triglycerides  72      HDL Cholesterol  51      LDL Cholesterol   98              Data reviewed: Consultant notes ED, gastro           Assessment and Plan   Diagnoses and all orders for this visit:    1. Chronic pain of left knee (Primary)  Comments:  Discussed tx options   XR ordered   RICE therapy   Rec knee brace  Consider PT, steroid injection   Orders:  -     XR Knee 3 View Left; Future    2. Elevated blood-pressure reading without diagnosis of hypertension  Assessment & Plan:  Controlled  CTM   Low NA diet   Regular exercise/weight loss       3. Gastroesophageal reflux disease without esophagitis  Assessment & Plan:  Controlled  Continue protonix as prescribed   Lifestyle modifications to treat       4. Left leg pain  Comments:  Discussed tx options   Dopplar US at pt request     Orders:  -     Duplex Venous Lower Extremity - Left CAR; Future           Follow Up   No follow-ups on  file.  Patient was given instructions and counseling regarding her condition or for health maintenance advice. Please see specific information pulled into the AVS if appropriate.

## 2022-08-11 RX ORDER — ERGOCALCIFEROL 1.25 MG/1
CAPSULE ORAL
Qty: 12 CAPSULE | Refills: 0 | Status: SHIPPED | OUTPATIENT
Start: 2022-08-11 | End: 2023-02-01 | Stop reason: SDUPTHER

## 2022-09-13 ENCOUNTER — TELEMEDICINE (OUTPATIENT)
Dept: GASTROENTEROLOGY | Facility: CLINIC | Age: 37
End: 2022-09-13

## 2022-09-13 ENCOUNTER — APPOINTMENT (OUTPATIENT)
Dept: CARDIOLOGY | Facility: HOSPITAL | Age: 37
End: 2022-09-13

## 2022-09-13 DIAGNOSIS — K21.00 GASTROESOPHAGEAL REFLUX DISEASE WITH ESOPHAGITIS WITHOUT HEMORRHAGE: ICD-10-CM

## 2022-09-13 DIAGNOSIS — R13.10 DYSPHAGIA, UNSPECIFIED TYPE: Primary | ICD-10-CM

## 2022-09-13 DIAGNOSIS — K44.9 HIATAL HERNIA: ICD-10-CM

## 2022-09-13 DIAGNOSIS — Z86.010 HISTORY OF COLON POLYPS: ICD-10-CM

## 2022-09-13 PROCEDURE — 99213 OFFICE O/P EST LOW 20 MIN: CPT | Performed by: NURSE PRACTITIONER

## 2022-09-22 ENCOUNTER — HOSPITAL ENCOUNTER (OUTPATIENT)
Dept: CARDIOLOGY | Facility: HOSPITAL | Age: 37
Discharge: HOME | End: 2022-09-22
Admitting: NURSE PRACTITIONER

## 2022-09-22 DIAGNOSIS — M79.605 LEFT LEG PAIN: ICD-10-CM

## 2022-09-22 LAB
BH CV LOWER VASCULAR LEFT COMMON FEMORAL AUGMENT: NORMAL
BH CV LOWER VASCULAR LEFT COMMON FEMORAL COMPETENT: NORMAL
BH CV LOWER VASCULAR LEFT COMMON FEMORAL COMPRESS: NORMAL
BH CV LOWER VASCULAR LEFT COMMON FEMORAL PHASIC: NORMAL
BH CV LOWER VASCULAR LEFT COMMON FEMORAL SPONT: NORMAL
BH CV LOWER VASCULAR LEFT DISTAL FEMORAL COMPRESS: NORMAL
BH CV LOWER VASCULAR LEFT GASTRONEMIUS COMPRESS: NORMAL
BH CV LOWER VASCULAR LEFT GREATER SAPH AK COMPRESS: NORMAL
BH CV LOWER VASCULAR LEFT GREATER SAPH BK COMPRESS: NORMAL
BH CV LOWER VASCULAR LEFT LESSER SAPH COMPRESS: NORMAL
BH CV LOWER VASCULAR LEFT MID FEMORAL AUGMENT: NORMAL
BH CV LOWER VASCULAR LEFT MID FEMORAL COMPETENT: NORMAL
BH CV LOWER VASCULAR LEFT MID FEMORAL COMPRESS: NORMAL
BH CV LOWER VASCULAR LEFT MID FEMORAL PHASIC: NORMAL
BH CV LOWER VASCULAR LEFT MID FEMORAL SPONT: NORMAL
BH CV LOWER VASCULAR LEFT PERONEAL COMPRESS: NORMAL
BH CV LOWER VASCULAR LEFT POPLITEAL AUGMENT: NORMAL
BH CV LOWER VASCULAR LEFT POPLITEAL COMPETENT: NORMAL
BH CV LOWER VASCULAR LEFT POPLITEAL COMPRESS: NORMAL
BH CV LOWER VASCULAR LEFT POPLITEAL PHASIC: NORMAL
BH CV LOWER VASCULAR LEFT POPLITEAL SPONT: NORMAL
BH CV LOWER VASCULAR LEFT POSTERIOR TIBIAL COMPRESS: NORMAL
BH CV LOWER VASCULAR LEFT PROXIMAL FEMORAL COMPRESS: NORMAL
BH CV LOWER VASCULAR LEFT SAPHENOFEMORAL JUNCTION COMPRESS: NORMAL
BH CV LOWER VASCULAR RIGHT COMMON FEMORAL AUGMENT: NORMAL
BH CV LOWER VASCULAR RIGHT COMMON FEMORAL COMPETENT: NORMAL
BH CV LOWER VASCULAR RIGHT COMMON FEMORAL COMPRESS: NORMAL
BH CV LOWER VASCULAR RIGHT COMMON FEMORAL PHASIC: NORMAL
BH CV LOWER VASCULAR RIGHT COMMON FEMORAL SPONT: NORMAL
MAXIMAL PREDICTED HEART RATE: 182 BPM
STRESS TARGET HR: 155 BPM

## 2022-09-22 PROCEDURE — 93971 EXTREMITY STUDY: CPT | Performed by: SURGERY

## 2022-09-22 PROCEDURE — 93971 EXTREMITY STUDY: CPT

## 2022-10-03 ENCOUNTER — TELEPHONE (OUTPATIENT)
Dept: FAMILY MEDICINE CLINIC | Facility: CLINIC | Age: 37
End: 2022-10-03

## 2022-10-03 NOTE — TELEPHONE ENCOUNTER
Caller: Neha May    Relationship: Self    Best call back number: [phone]    Who is your current provider: DR DUMONT    Who would you like your new provider to be: DR BARRIENTOS    What are your reasons for transferring care: PATIENT STATES HER  AND CHILDREN SEE DR BARRIENTOS SO SHE WOULD JUST LIKE FOR HIM TO BE HER PCP AS WELL.    Additional notes: PATIENT STATES TO PLEASE CALL AND ADVISE OF THE DECISION.

## 2022-10-04 ENCOUNTER — OFFICE VISIT (OUTPATIENT)
Dept: FAMILY MEDICINE CLINIC | Facility: CLINIC | Age: 37
End: 2022-10-04

## 2022-10-04 VITALS
HEART RATE: 94 BPM | SYSTOLIC BLOOD PRESSURE: 142 MMHG | BODY MASS INDEX: 39.35 KG/M2 | HEIGHT: 59 IN | OXYGEN SATURATION: 98 % | WEIGHT: 195.2 LBS | DIASTOLIC BLOOD PRESSURE: 97 MMHG | TEMPERATURE: 98.6 F

## 2022-10-04 DIAGNOSIS — R11.0 NAUSEA: ICD-10-CM

## 2022-10-04 DIAGNOSIS — E66.9 OBESITY (BMI 30-39.9): Primary | ICD-10-CM

## 2022-10-04 DIAGNOSIS — I10 BENIGN ESSENTIAL HTN: ICD-10-CM

## 2022-10-04 DIAGNOSIS — K21.9 GASTROESOPHAGEAL REFLUX DISEASE WITHOUT ESOPHAGITIS: Chronic | ICD-10-CM
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
## 2022-10-04 PROCEDURE — 99214 OFFICE O/P EST MOD 30 MIN: CPT | Performed by: NURSE PRACTITIONER

## 2022-10-04 RX ORDER — LISINOPRIL 10 MG/1
10 TABLET ORAL DAILY
Qty: 30 TABLET | Refills: 5 | Status: SHIPPED | OUTPATIENT
Start: 2022-10-04

## 2022-10-04 RX ORDER — ONDANSETRON HYDROCHLORIDE 8 MG/1
8 TABLET, FILM COATED ORAL EVERY 8 HOURS PRN
Qty: 20 TABLET | Refills: 0 | Status: SHIPPED | OUTPATIENT
Start: 2022-10-04

## 2022-10-04 NOTE — PROGRESS NOTES
"Chief Complaint  discuss weightloss options (Patient stated she has been prescribed phentermine and saxenda but has not taken it longer than two weeks in the past.) and Insect Bite (Two red bites below left buttocks.)    Subjective        Neha May presents to [facility]  History of Present Illness  Pt presents requesting medication for weight loss. States she has tried Saxenda and phentermine in the past but only took both for a few days. States Saxenda caused nausea but she is open to trying another time. Pt has tried to lose weight with diet and exercise modifications without success.     Pt c/o insect bite on buttock x 2. States it does not hurt or itch. Noticed a few days ago. Has not taken anything to treat. Denies fever, chills, myalgia, fatigue, rash or other.     Reviewed all recent labs and medications.      Objective   Vital Signs:  /97   Pulse 94   Temp 98.6 °F (37 °C) (Temporal)   Ht 149.9 cm (59\")   Wt 88.5 kg (195 lb 3.2 oz)   SpO2 98%   BMI 39.43 kg/m²   Estimated body mass index is 39.43 kg/m² as calculated from the following:    Height as of this encounter: 149.9 cm (59\").    Weight as of this encounter: 88.5 kg (195 lb 3.2 oz).          Physical Exam  Vitals reviewed.   Constitutional:       General: She is not in acute distress.     Appearance: Normal appearance.   HENT:      Head: Normocephalic.      Right Ear: Tympanic membrane normal.      Left Ear: Tympanic membrane normal.      Nose: Nose normal.      Mouth/Throat:      Pharynx: Oropharynx is clear. No posterior oropharyngeal erythema.   Eyes:      General: No scleral icterus.     Extraocular Movements: Extraocular movements intact.      Conjunctiva/sclera: Conjunctivae normal.      Pupils: Pupils are equal, round, and reactive to light.   Cardiovascular:      Rate and Rhythm: Normal rate and regular rhythm.      Pulses: Normal pulses.      Heart sounds: Normal heart sounds.   Pulmonary: "      Effort: Pulmonary effort is normal.      Breath sounds: Normal breath sounds.   Abdominal:      General: Bowel sounds are normal.      Palpations: Abdomen is soft.   Musculoskeletal:         General: Normal range of motion.      Cervical back: Neck supple.   Skin:     General: Skin is warm and dry.      Findings: Lesion present.      Comments: 2 erythemic welps 1cm round left lower buttock, no bite marks, no discharge, no warmth or surrounding rash   Neurological:      Mental Status: She is alert and oriented to person, place, and time.   Psychiatric:         Mood and Affect: Mood normal.         Behavior: Behavior normal.         Thought Content: Thought content normal.         Judgment: Judgment normal.        Result Review :    Common labs    Common Labs 1/17/22 1/17/22 1/17/22 1/18/22 1/18/22    1118 1118 1118 1533 1533   Glucose   94  88   BUN   12  12   Creatinine   0.80  0.78   eGFR Non African Am   81  83   Sodium   136  136   Potassium   4.6  3.9   Chloride   102  102   Calcium   9.8  9.5   Albumin   4.30  4.40   Total Bilirubin   0.4  0.3   Alkaline Phosphatase   65  69   AST (SGOT)   16  17   ALT (SGPT)   14  16   WBC 7.45   7.26    Hemoglobin 14.9   14.7    Hematocrit 45.2   43.0    Platelets 281   314    Total Cholesterol  163      Triglycerides  72      HDL Cholesterol  51      LDL Cholesterol   98                        Assessment and Plan   Diagnoses and all orders for this visit:    1. Obesity (BMI 30-39.9) (Primary)  Assessment & Plan:  Patient's (Body mass index is 39.43 kg/m².) indicates that they are obese (BMI >30) with health conditions that include hypertension and GERD . Weight is unchanged. BMI is is above average; BMI management plan is completed. We discussed portion control, increasing exercise and pharmacologic options including saxenda .       2. Benign essential HTN  Assessment & Plan:  Hypertension is worsening.  Dietary sodium restriction.  Weight loss.  Regular aerobic  exercise.  Medication changes per orders.  Blood pressure will be reassessed in 4 weeks.      3. Insect bite, unspecified site, initial encounter  Comments:  hydrocortisone cream apply bid prn   daily antihistamine UAD prn     4. Gastroesophageal reflux disease without esophagitis  Assessment & Plan:  Controlled   Continue protonix 20mg PO qd  Lifestyle mods to treat as well       5. Nausea  Comments:  zofran 8mg PO tid prn   bland diet   increase clear fluids     Other orders  -     lisinopril (PRINIVIL,ZESTRIL) 10 MG tablet; Take 1 tablet by mouth Daily.  Dispense: 30 tablet; Refill: 5  -     Liraglutide (SAXENDA) 18 MG/3ML injection pen; Inject 0.6mg under skin daily for week one, THEN 1.2mg daily for week two, THEN 1.8mg daily for week three, then 2.4mg daily for week four.  Dispense: 12 mL; Refill: 2  -     ondansetron (Zofran) 8 MG tablet; Take 1 tablet by mouth Every 8 (Eight) Hours As Needed for Nausea.  Dispense: 20 tablet; Refill: 0           Follow Up   Return in about 4 weeks (around 11/1/2022).  Patient was given instructions and counseling regarding her condition or for health maintenance advice. Please see specific information pulled into the AVS if appropriate.

## 2022-10-04 NOTE — PATIENT INSTRUCTIONS
Blood Pressure Record Sheet  To take your blood pressure, you will need a blood pressure machine. You can buy a blood pressure machine (blood pressure monitor) at your clinic, drug store, or online. When choosing one, consider:  An automatic monitor that has an arm cuff.  A cuff that wraps snugly around your upper arm. You should be able to fit only one finger between your arm and the cuff.  A device that stores blood pressure reading results.  Do not choose a monitor that measures your blood pressure from your wrist or finger.  Follow your health care provider's instructions for how to take your blood pressure. To use this form:  Get one reading in the morning (a.m.) before you take any medicines.  Get one reading in the evening (p.m.) before supper.  Take at least 2 readings with each blood pressure check. This makes sure the results are correct. Wait 1-2 minutes between measurements.  Write down the results in the spaces on this form.  Repeat this once a week, or as told by your health care provider.  Make a follow-up appointment with your health care provider to discuss the results.  Blood pressure log  Date: _______________________  a.m. _____________________(1st reading) _____________________(2nd reading)  p.m. _____________________(1st reading) _____________________(2nd reading)  Date: _______________________  a.m. _____________________(1st reading) _____________________(2nd reading)  p.m. _____________________(1st reading) _____________________(2nd reading)  Date: _______________________  a.m. _____________________(1st reading) _____________________(2nd reading)  p.m. _____________________(1st reading) _____________________(2nd reading)  Date: _______________________  a.m. _____________________(1st reading) _____________________(2nd reading)  p.m. _____________________(1st reading) _____________________(2nd reading)  Date: _______________________  a.m. _____________________(1st reading)  _____________________(2nd reading)  p.m. _____________________(1st reading) _____________________(2nd reading)  This information is not intended to replace advice given to you by your health care provider. Make sure you discuss any questions you have with your health care provider.  Document Revised: 04/06/2021 Document Reviewed: 04/07/2021  Elsevier Patient Education © 2022 [employer] Inc[employer]

## 2022-10-04 NOTE — ASSESSMENT & PLAN NOTE
Patient's (Body mass index is 39.43 kg/m².) indicates that they are obese (BMI >30) with health conditions that include hypertension and GERD . Weight is unchanged. BMI is is above average; BMI management plan is completed. We discussed portion control, increasing exercise and pharmacologic options including saxenda .

## 2022-10-06 ENCOUNTER — TELEPHONE (OUTPATIENT)
Dept: FAMILY MEDICINE CLINIC | Facility: CLINIC | Age: 37
End: 2022-10-06

## 2022-10-06 NOTE — TELEPHONE ENCOUNTER
Caller: Neha May    Relationship: Self    Best call back number: [phone]    What is the best time to reach you: ANYTIME    Who are you requesting to speak with (clinical staff, provider,  specific staff member): CLINICAL    What was the call regarding: PATIENT IS CHECKING ON PRIOR AUTHORIZATION THAT GOT DENIED FOR SAXJUANJONDA. SHE IS REQUESTING CALL BACK TO DISCUSS NEXT STEPS.     Do you require a callback: YES

## 2022-10-06 NOTE — TELEPHONE ENCOUNTER
It looks like her insurance will not cover this medication until she completes 16 weeks of therapy with the requested drug, lost at least 4 % of her body weight or keeps initial 4 % weight loss off.

## 2022-10-06 NOTE — TELEPHONE ENCOUNTER
Caller: Neha May    Relationship: Self    Best call back number:[phone]    What is the best time to reach you: ANY    Who are you requesting to speak with (clinical staff, provider,  specific staff member): CLINICAL STAFF    What was the call regarding: PATIENT STATES THAT HER SAXENDA WAS DENIED BY INSURANCE AND SHE IS INQUIRING WHAT TO DO ABOUT THIS. PATIENT IS REQUESTING A CALL BACK. THANK YOU.    Do you require a callback: YES

## 2022-10-18 ENCOUNTER — OFFICE VISIT (OUTPATIENT)
Dept: OBSTETRICS AND GYNECOLOGY | Facility: CLINIC | Age: 37
End: 2022-10-18

## 2022-10-18 VITALS
WEIGHT: 192 LBS | BODY MASS INDEX: 38.78 KG/M2 | HEART RATE: 101 BPM | SYSTOLIC BLOOD PRESSURE: 127 MMHG | DIASTOLIC BLOOD PRESSURE: 84 MMHG

## 2022-10-18 DIAGNOSIS — Z01.419 ENCOUNTER FOR GYNECOLOGICAL EXAMINATION WITHOUT ABNORMAL FINDING: Primary | ICD-10-CM

## 2022-10-18 PROCEDURE — 99395 PREV VISIT EST AGE 18-39: CPT | Performed by: NURSE PRACTITIONER

## 2022-10-18 PROCEDURE — 87624 HPV HI-RISK TYP POOLED RSLT: CPT | Performed by: NURSE PRACTITIONER

## 2022-10-18 PROCEDURE — G0123 SCREEN CERV/VAG THIN LAYER: HCPCS | Performed by: NURSE PRACTITIONER

## 2022-10-18 RX ORDER — PEN NEEDLE, DIABETIC 32GX 5/32"
NEEDLE, DISPOSABLE MISCELLANEOUS
COMMUNITY
Start: 2022-10-11 | End: 2023-02-08

## 2022-10-18 NOTE — PROGRESS NOTES
Well Woman Visit    CC: Scheduled annual well gyn visit  Chief Complaint   Patient presents with   • Gynecologic Exam     wwe       Myriad intake in the past?: No    DONE TODAY DID NOT QUALIFY TODAY    Contraception:  Tubal ligation  Social History     Substance and Sexual Activity   Sexual Activity Yes   • Partners: Male   • Birth control/protection: Tubal ligation       HPI:   37 y.o.     Menses:   q 28-30 days, lasts 5 days, changes products q 2hrs on heaviest days.     Pain:  Mild, OTC meds control discomfort    PCP: does manage PMHx and preventative labs  History: PMHx, Meds, Allergies, PSHx, Social Hx, and POBHx all reviewed and updated.    Pt has no complaints today.    PHYSICAL EXAM:  /84   Pulse 101   Wt 87.1 kg (192 lb)   BMI 38.78 kg/m²  Not found.  General- NAD, alert and oriented, appropriate  Psych- Normal mood, good memory  Neck- No masses, no thyroid enlargement  CV- Regular rhythm, no murnurs  Resp- CTA to bases, no wheezes  Abdomen- Soft, non distended, non tender, no masses    Breast left-  Bilaterally symmetrical, no masses, non tender, no nipple discharge  Breast right- Bilaterally symmetrical, no masses, non tender, no nipple discharge    External genitalia- Normal female, no lesions  Urethra/meatus- Normal, no masses, non tender  Bladder- Normal, no masses, non tender  Vagina- Normal, no atrophy, no lesions, no discharge.  Prolapse: none  Cvx- Normal, no lesions, no discharge, No cervical motion tenderness  Uterus- Normal size, shape & consistency.  Non tender, mobile, & no prolapse  Adnexa- No mass, non tender  Anus/Rectum/Perineum- Normal appearance, no mass, good sphincter tone, no hemorrhoids, no prolapse    Lymphatic- No palpable neck, axillary, or groin nodes  Ext- No edema, no cyanosis    Skin- No lesions, no rashes, no acanthosis nigricans      ASSESSMENT and PLAN:    Diagnoses and all orders for this visit:    1. Encounter for gynecological examination without abnormal  finding (Primary)  -     IgP, Aptima HPV        Preventative:  • BREAST HEALTH- Monthly self breast exam importance and how to reviewed. MMG and/or MRI (prn) reviewed per society guidelines and her individual history. Screen: Not medically needed  • CERVICAL CANCER Screening- Reviewed current ASCCP guidelines for screening w and wo cotest HPV, age specific.  Screen: Updated today  • SEXUAL HEALTH: Declines STD screening  • VACCINATIONS Recommended: COVID and booster PRN, Flu annually, Gardisil/HPV vaccine (up to 44yo).  Importance discussed, risk being unvaccinated reviewed.  Questions answered  • Smoking status- NON SMOKER  MYRIAD: Does not qualify.    She understands the importance of having any ordered tests to be performed in a timely fashion.  The risks of not performing them include, but are not limited to, advanced cancer stages, bone loss from osteoporosis and/or subsequent increase in morbidity and/or mortality.  She is encouraged to review her results online and/or contact or office if she has questions.     Follow Up:  Return in about 1 year (around 10/18/2023) for Annual physical.            Gigi Reynolds, APRN  10/18/2022    [facility] [address] [facility]  [facility]  [address] [address]  Dept: [phone]  Loc: [phone]

## 2022-10-24 LAB
CYTOLOGIST CVX/VAG CYTO: NORMAL
CYTOLOGY CVX/VAG DOC CYTO: NORMAL
CYTOLOGY CVX/VAG DOC THIN PREP: NORMAL
DX ICD CODE: NORMAL
HIV 1 & 2 AB SER-IMP: NORMAL
HPV I/H RISK 4 DNA CVX QL PROBE+SIG AMP: NEGATIVE
OTHER STN SPEC: NORMAL
STAT OF ADQ CVX/VAG CYTO-IMP: NORMAL

## 2022-11-02 ENCOUNTER — TELEMEDICINE (OUTPATIENT)
Dept: FAMILY MEDICINE CLINIC | Facility: CLINIC | Age: 37
End: 2022-11-02

## 2022-11-02 DIAGNOSIS — I10 BENIGN ESSENTIAL HTN: ICD-10-CM

## 2022-11-02 DIAGNOSIS — E66.9 OBESITY (BMI 30-39.9): Primary | ICD-10-CM

## 2022-11-02 DIAGNOSIS — K21.9 GASTROESOPHAGEAL REFLUX DISEASE WITHOUT ESOPHAGITIS: Chronic | ICD-10-CM

## 2022-11-02 PROCEDURE — 99214 OFFICE O/P EST MOD 30 MIN: CPT | Performed by: NURSE PRACTITIONER

## 2022-11-02 NOTE — PROGRESS NOTES
"Chief Complaint  Follow-up (1 month follow up on saxenda, pt states she thinks the two injections she has on hand are frozen and unsure if she could still use them.)    Subjective         Neha May presents to [facility]  History of Present Illness  Pt present 1 month FU saxenda. States she is tolerating medication well. Reports no med SE/AEs while taking. States she has lost at least 2-4 lbs since beginning medication. Continues with diet and exercise mods as well. Pt is concerned that her Saxenda pens may have \"frozen\". States her refrigerator got very cold and she is now unsure if they froze or not. Doesn't know if she should still use.     Reviewed all recent labs and medications.    Objective   Vital Signs:   There were no vitals taken for this visit.    Physical Exam   Constitutional: She appears well-developed and well-nourished.   HENT:   Head: Normocephalic and atraumatic.   Eyes: Conjunctivae are normal.   Neck: Neck normal appearance.  Pulmonary/Chest: Effort normal.   Neurological: She is alert.   Skin: Skin is warm and dry.   Psychiatric: She has a normal mood and affect.   Vitals reviewed.    Result Review :     Common labs    Common Labs 1/17/22 1/17/22 1/17/22 1/18/22 1/18/22    1118 1118 1118 1533 1533   Glucose   94  88   BUN   12  12   Creatinine   0.80  0.78   eGFR Non African Am   81  83   Sodium   136  136   Potassium   4.6  3.9   Chloride   102  102   Calcium   9.8  9.5   Albumin   4.30  4.40   Total Bilirubin   0.4  0.3   Alkaline Phosphatase   65  69   AST (SGOT)   16  17   ALT (SGPT)   14  16   WBC 7.45   7.26    Hemoglobin 14.9   14.7    Hematocrit 45.2   43.0    Platelets 281   314    Total Cholesterol  163      Triglycerides  72      HDL Cholesterol  51      LDL Cholesterol   98                        Assessment and Plan    Diagnoses and all orders for this visit:    1. Obesity (BMI 30-39.9) (Primary)  Assessment & Plan:  Patient's (There is no " height or weight on file to calculate BMI.) indicates that they are obese (BMI >30) with health conditions that include hypertension . Weight is improving with treatment. BMI is is above average; BMI management plan is completed. We discussed portion control and increasing exercise.       2. Benign essential HTN  Assessment & Plan:  Hypertension is improving with treatment.  Continue current treatment regimen.  Dietary sodium restriction.  Weight loss.  Regular aerobic exercise.  Blood pressure will be reassessed at the next regular appointment.      3. Gastroesophageal reflux disease without esophagitis  Assessment & Plan:  Controlled  Continue protonix 20mg PO qd   Lifestyle mods to treat as well         Follow Up   No follow-ups on file.  Patient was given instructions and counseling regarding her condition or for health maintenance advice. Please see specific information pulled into the AVS if appropriate.     Mode of Visit: Video  Location of patient: other: car  Location of provider: [facility]  You have chosen to receive care through a telehealth visit.  Does the patient consent to use a video/audio connection for your medical care today? Yes  The visit included audio and video interaction. No technical issues occurred during this visit.

## 2022-11-02 NOTE — ASSESSMENT & PLAN NOTE
Patient's (There is no height or weight on file to calculate BMI.) indicates that they are obese (BMI >30) with health conditions that include hypertension . Weight is improving with treatment. BMI is is above average; BMI management plan is completed. We discussed portion control and increasing exercise.   Phoned Saxenda rep. Advised NOT to take frozen Saxenda. All questions answered, pt verbalized understanding.

## 2022-11-02 NOTE — PATIENT INSTRUCTIONS
Obesity, Adult  Obesity is having too much body fat. Being obese means that your weight is more than what is healthy for you.   BMI (body mass index) is a number that explains how much body fat you have. If you have a BMI of 30 or more, you are obese.  Obesity can cause serious health problems, such as:  Stroke.  Coronary artery disease (CAD).  Type 2 diabetes.  Some types of cancer.  High blood pressure (hypertension).  High cholesterol.  Gallbladder stones.  Obesity can also contribute to:  Osteoarthritis.  Sleep apnea.  Infertility problems.  What are the causes?  Eating meals each day that are high in calories, sugar, and fat.  Drinking a lot of drinks that have sugar in them.  Being born with genes that may make you more likely to become obese.  Having a medical condition that causes obesity.  Taking certain medicines.  Sitting a lot (having a sedentary lifestyle).  Not getting enough sleep.  What increases the risk?  Having a family history of obesity.  Living in an area with limited access to:  [place], recreation centers, or sidewalks.  Healthy food choices, such as grocery stores and farmers' markets.  What are the signs or symptoms?  The main sign is having too much body fat.  How is this treated?  Treatment for this condition often includes changing your lifestyle. Treatment may include:  Changing your diet. This may include making a healthy meal plan.  Exercise. This may include activity that causes your heart to beat faster (aerobic exercise) and strength training. Work with your doctor to design a program that works for you.  Medicine to help you lose weight. This may be used if you are not able to lose one pound a week after 6 weeks of healthy eating and more exercise.  Treating conditions that cause the obesity.  Surgery. Options may include gastric banding and gastric bypass. This may be done if:  Other treatments have not helped to improve your condition.  You have a BMI of 40 or higher.  You have  life-threatening health problems related to obesity.  Follow these instructions at home:  Eating and drinking    Follow advice from your doctor about what to eat and drink. Your doctor may tell you to:  Limit fast food, sweets, and processed snack foods.  Choose low-fat options. For example, choose low-fat milk instead of whole milk.  Eat five or more servings of fruits or vegetables each day.  Eat at home more often. This gives you more control over what you eat.  Choose healthy foods when you eat out.  Learn to read food labels. This will help you learn how much food is in one serving.  Keep low-fat snacks available.  Avoid drinks that have a lot of sugar in them. These include soda, fruit juice, iced tea with sugar, and flavored milk.  Drink enough water to keep your pee (urine) pale yellow.  Do not go on fad diets.  Physical activity  Exercise often, as told by your doctor. Most adults should get up to 150 minutes of moderate-intensity exercise every week.Ask your doctor:  What types of exercise are safe for you.  How often you should exercise.  Warm up and stretch before being active.  Do slow stretching after being active (cool down).  Rest between times of being active.  Lifestyle  Work with your doctor and a food expert (dietitian) to set a weight-loss goal that is best for you.  Limit your screen time.  Find ways to reward yourself that do not involve food.  Do not drink alcohol if:  Your doctor tells you not to drink.  You are pregnant, may be pregnant, or are planning to become pregnant.  If you drink alcohol:  Limit how much you have to:  0-1 drink a day for women.  0-2 drinks a day for men.  Know how much alcohol is in your drink. In the [place] one drink equals one 12 oz bottle of beer (355 mL), one 5 oz glass of wine (148 mL), or one 1½ oz glass of hard liquor (44 mL).  General instructions  Keep a weight-loss journal. This can help you keep track of:  The food that you eat.  How much exercise you  get.  Take over-the-counter and prescription medicines only as told by your doctor.  Take vitamins and supplements only as told by your doctor.  Think about joining a support group.  Pay attention to your mental health as obesity can lead to depression or self esteem issues.  Keep all follow-up visits.  Contact a doctor if:  You cannot meet your weight-loss goal after you have changed your diet and lifestyle for 6 weeks.  You are having trouble breathing.  Summary  Obesity is having too much body fat.  Being obese means that your weight is more than what is healthy for you.  Work with your doctor to set a weight-loss goal.  Get regular exercise as told by your doctor.  This information is not intended to replace advice given to you by your health care provider. Make sure you discuss any questions you have with your health care provider.  Document Revised: 07/26/2022 Document Reviewed: 07/26/2022  Elsevier Patient Education © 2022 [employer] Inc.

## 2022-12-23 PROCEDURE — 93005 ELECTROCARDIOGRAM TRACING: CPT | Performed by: EMERGENCY MEDICINE

## 2022-12-23 PROCEDURE — 99283 EMERGENCY DEPT VISIT LOW MDM: CPT

## 2022-12-23 RX ORDER — SODIUM CHLORIDE 0.9 % (FLUSH) 0.9 %
10 SYRINGE (ML) INJECTION AS NEEDED
Status: DISCONTINUED | OUTPATIENT
Start: 2022-12-23 | End: 2022-12-24 | Stop reason: HOSPADM

## 2022-12-23 RX ORDER — ASPIRIN 81 MG/1
324 TABLET, CHEWABLE ORAL ONCE
Status: DISCONTINUED | OUTPATIENT
Start: 2022-12-24 | End: 2022-12-24 | Stop reason: HOSPADM

## 2022-12-24 ENCOUNTER — APPOINTMENT (OUTPATIENT)
Dept: GENERAL RADIOLOGY | Facility: HOSPITAL | Age: 37
End: 2022-12-24

## 2022-12-24 ENCOUNTER — HOSPITAL ENCOUNTER (EMERGENCY)
Facility: HOSPITAL | Age: 37
Discharge: HOME | End: 2022-12-24
Attending: EMERGENCY MEDICINE | Admitting: EMERGENCY MEDICINE

## 2022-12-24 VITALS
BODY MASS INDEX: 38.49 KG/M2 | WEIGHT: 190.92 LBS | SYSTOLIC BLOOD PRESSURE: 109 MMHG | OXYGEN SATURATION: 98 % | HEART RATE: 59 BPM | DIASTOLIC BLOOD PRESSURE: 80 MMHG | HEIGHT: 59 IN | RESPIRATION RATE: 16 BRPM | TEMPERATURE: 98.2 F

## 2022-12-24 DIAGNOSIS — R07.9 CHEST PAIN, UNSPECIFIED TYPE: Primary | ICD-10-CM

## 2022-12-24 LAB
ALBUMIN SERPL-MCNC: 4.4 G/DL (ref 3.5–5.2)
ALBUMIN/GLOB SERPL: 1.5 G/DL
ALP SERPL-CCNC: 87 U/L (ref 39–117)
ALT SERPL W P-5'-P-CCNC: 19 U/L (ref 1–33)
ANION GAP SERPL CALCULATED.3IONS-SCNC: 12.5 MMOL/L (ref 5–15)
AST SERPL-CCNC: 18 U/L (ref 1–32)
BASOPHILS # BLD AUTO: 0.06 10*3/MM3 (ref 0–0.2)
BASOPHILS NFR BLD AUTO: 0.6 % (ref 0–1.5)
BILIRUB SERPL-MCNC: 0.2 MG/DL (ref 0–1.2)
BUN SERPL-MCNC: 18 MG/DL (ref 6–20)
BUN/CREAT SERPL: 17.6 (ref 7–25)
CALCIUM SPEC-SCNC: 9.3 MG/DL (ref 8.6–10.5)
CHLORIDE SERPL-SCNC: 101 MMOL/L (ref 98–107)
CO2 SERPL-SCNC: 23.5 MMOL/L (ref 22–29)
CREAT SERPL-MCNC: 1.02 MG/DL (ref 0.57–1)
DEPRECATED RDW RBC AUTO: 39 FL (ref 37–54)
EGFRCR SERPLBLD CKD-EPI 2021: 72.4 ML/MIN/1.73
EOSINOPHIL # BLD AUTO: 0.25 10*3/MM3 (ref 0–0.4)
EOSINOPHIL NFR BLD AUTO: 2.5 % (ref 0.3–6.2)
ERYTHROCYTE [DISTWIDTH] IN BLOOD BY AUTOMATED COUNT: 12 % (ref 12.3–15.4)
GLOBULIN UR ELPH-MCNC: 3 GM/DL
GLUCOSE SERPL-MCNC: 89 MG/DL (ref 65–99)
HCT VFR BLD AUTO: 40.9 % (ref 34–46.6)
HGB BLD-MCNC: 14.1 G/DL (ref 12–15.9)
HOLD SPECIMEN: NORMAL
IMM GRANULOCYTES # BLD AUTO: 0.03 10*3/MM3 (ref 0–0.05)
IMM GRANULOCYTES NFR BLD AUTO: 0.3 % (ref 0–0.5)
LIPASE SERPL-CCNC: 31 U/L (ref 13–60)
LYMPHOCYTES # BLD AUTO: 2.29 10*3/MM3 (ref 0.7–3.1)
LYMPHOCYTES NFR BLD AUTO: 22.9 % (ref 19.6–45.3)
MAGNESIUM SERPL-MCNC: 1.8 MG/DL (ref 1.6–2.6)
MCH RBC QN AUTO: 30.8 PG (ref 26.6–33)
MCHC RBC AUTO-ENTMCNC: 34.5 G/DL (ref 31.5–35.7)
MCV RBC AUTO: 89.3 FL (ref 79–97)
MONOCYTES # BLD AUTO: 0.71 10*3/MM3 (ref 0.1–0.9)
MONOCYTES NFR BLD AUTO: 7.1 % (ref 5–12)
NEUTROPHILS NFR BLD AUTO: 6.66 10*3/MM3 (ref 1.7–7)
NEUTROPHILS NFR BLD AUTO: 66.6 % (ref 42.7–76)
NRBC BLD AUTO-RTO: 0 /100 WBC (ref 0–0.2)
NT-PROBNP SERPL-MCNC: <36 PG/ML (ref 0–450)
PLATELET # BLD AUTO: 277 10*3/MM3 (ref 140–450)
PMV BLD AUTO: 10.3 FL (ref 6–12)
POTASSIUM SERPL-SCNC: 4.1 MMOL/L (ref 3.5–5.2)
PROT SERPL-MCNC: 7.4 G/DL (ref 6–8.5)
RBC # BLD AUTO: 4.58 10*6/MM3 (ref 3.77–5.28)
SODIUM SERPL-SCNC: 137 MMOL/L (ref 136–145)
TROPONIN I SERPL-MCNC: 0 NG/ML (ref 0–0.08)
TROPONIN I SERPL-MCNC: 0 NG/ML (ref 0–0.08)
WBC NRBC COR # BLD: 10 10*3/MM3 (ref 3.4–10.8)
WHOLE BLOOD HOLD COAG: NORMAL
WHOLE BLOOD HOLD SPECIMEN: NORMAL

## 2022-12-24 PROCEDURE — 85025 COMPLETE CBC W/AUTO DIFF WBC: CPT | Performed by: EMERGENCY MEDICINE

## 2022-12-24 PROCEDURE — 83880 ASSAY OF NATRIURETIC PEPTIDE: CPT | Performed by: EMERGENCY MEDICINE

## 2022-12-24 PROCEDURE — 93010 ELECTROCARDIOGRAM REPORT: CPT | Performed by: INTERNAL MEDICINE

## 2022-12-24 PROCEDURE — 36415 COLL VENOUS BLD VENIPUNCTURE: CPT

## 2022-12-24 PROCEDURE — 93005 ELECTROCARDIOGRAM TRACING: CPT | Performed by: EMERGENCY MEDICINE

## 2022-12-24 PROCEDURE — 93005 ELECTROCARDIOGRAM TRACING: CPT

## 2022-12-24 PROCEDURE — 71045 X-RAY EXAM CHEST 1 VIEW: CPT

## 2022-12-24 PROCEDURE — 83735 ASSAY OF MAGNESIUM: CPT | Performed by: EMERGENCY MEDICINE

## 2022-12-24 PROCEDURE — 83690 ASSAY OF LIPASE: CPT | Performed by: EMERGENCY MEDICINE

## 2022-12-24 PROCEDURE — 80053 COMPREHEN METABOLIC PANEL: CPT | Performed by: EMERGENCY MEDICINE

## 2022-12-24 PROCEDURE — 84484 ASSAY OF TROPONIN QUANT: CPT

## 2022-12-24 NOTE — ED PROVIDER NOTES
Time: 12:44 AM EST  Arrived by: private car  Chief Complaint:   Chief Complaint   Patient presents with   • Chest Pain     History provided by: Patient  History is limited by: N/A     History of Present Illness:      Patient is a 37 y.o. year old female that presents to the emergency department with chest pain. Pt reports left shoulder blade pain that radiates to middle of back and in chest. Pt reports symptoms started today after waking up from a nap. Pt denies cough, shortness of breath, fever.       History provided by:  Patient   used: No    Chest Pain  Pain location:  L chest  Pain quality: sharp    Pain radiates to:  Upper back and L shoulder  Pain severity:  Mild  Onset quality:  Gradual  Duration: several hours.  Timing:  Constant  Progression:  Partially resolved  Chronicity:  New  Context: at rest    Context: not breathing and not lifting    Relieved by:  Nothing  Worsened by:  Nothing  Ineffective treatments:  None tried  Associated symptoms: no abdominal pain, no cough, no fever, no headache, no nausea, no shortness of breath and no vomiting        Similar Symptoms Previously: N/A  Recently seen: 10/18/22      Patient Care Team  Primary Care Provider: Rachael Waters APRN    Past Medical History:     No Known Allergies  Past Medical History:   Diagnosis Date   • Foot pain, left    • Heel pain    • Numbness in feet      Past Surgical History:   Procedure Laterality Date   •  SECTION     • COLONOSCOPY     • UPPER GASTROINTESTINAL ENDOSCOPY       Family History   Problem Relation Age of Onset   • Colon cancer Maternal Grandmother         45s       Home Medications:  Prior to Admission medications    Medication Sig Start Date End Date Taking? Authorizing Provider   BD Pen Needle Anna U/F 32G X 4 MM misc  10/11/22   Provider, MD Jocelyn   Liraglutide (SAXENDA) 18 MG/3ML injection pen Inject 0.6mg under skin daily for week one, THEN 1.2mg daily for week two, THEN  "1.8mg daily for week three, then 2.4mg daily for week four. 11/2/22   Rachael Waters APRN   lisinopril (PRINIVIL,ZESTRIL) 10 MG tablet Take 1 tablet by mouth Daily. 10/4/22   Rachael Waters APRN   ondansetron (Zofran) 8 MG tablet Take 1 tablet by mouth Every 8 (Eight) Hours As Needed for Nausea. 10/4/22   Rachael Waters APRN   pantoprazole (PROTONIX) 20 MG EC tablet Take 1 tablet by mouth Daily. 1/27/22   Elsa Casey DO   vitamin D (ERGOCALCIFEROL) 1.25 MG (70937 UT) capsule capsule TAKE 1 CAPSULE BY MOUTH EVERY WEEK 8/11/22   Elsa Casey DO        Social History:   Social History     Tobacco Use   • Smoking status: Never   • Smokeless tobacco: Never   Vaping Use   • Vaping Use: Never used   Substance Use Topics   • Alcohol use: Never   • Drug use: Never     Recent travel: not applicable     Review of Systems:  Review of Systems   Constitutional: Negative for chills and fever.   HENT: Negative for congestion, ear pain and sore throat.    Eyes: Negative for pain.   Respiratory: Negative for cough, chest tightness and shortness of breath.    Cardiovascular: Positive for chest pain.   Gastrointestinal: Negative for abdominal pain, diarrhea, nausea and vomiting.   Genitourinary: Negative for flank pain and hematuria.   Musculoskeletal: Negative for joint swelling and neck pain.   Skin: Negative for pallor.   Neurological: Negative for seizures and headaches.        Physical Exam:  /80 (BP Location: Left arm, Patient Position: Lying)   Pulse 59   Temp 98.2 °F (36.8 °C) (Oral)   Resp 16   Ht 149.9 cm (59\")   Wt 86.6 kg (190 lb 14.7 oz)   LMP 11/20/2022 (Approximate)   SpO2 98%   BMI 38.56 kg/m²     Physical Exam  Vitals and nursing note reviewed.   Constitutional:       General: She is not in acute distress.     Appearance: Normal appearance. She is not toxic-appearing.   HENT:      Head: Normocephalic and atraumatic.      Nose: Nose normal.      Mouth/Throat:      Mouth: " Mucous membranes are moist.   Eyes:      General: No scleral icterus.     Extraocular Movements: Extraocular movements intact.      Conjunctiva/sclera: Conjunctivae normal.      Pupils: Pupils are equal, round, and reactive to light.   Cardiovascular:      Rate and Rhythm: Normal rate and regular rhythm.      Pulses: Normal pulses.      Heart sounds: Normal heart sounds.   Pulmonary:      Effort: Pulmonary effort is normal. No respiratory distress.      Breath sounds: Normal breath sounds.   Abdominal:      General: Abdomen is flat. There is no distension.      Palpations: Abdomen is soft.      Tenderness: There is no abdominal tenderness.   Musculoskeletal:         General: Normal range of motion.      Cervical back: Normal range of motion and neck supple.   Skin:     General: Skin is warm and dry.      Capillary Refill: Capillary refill takes less than 2 seconds.   Neurological:      General: No focal deficit present.      Mental Status: She is alert and oriented to person, place, and time. Mental status is at baseline.   Psychiatric:         Mood and Affect: Mood normal.         Behavior: Behavior normal.                Medications in the Emergency Department:  Medications - No data to display     Labs  Lab Results (last 24 hours)     Procedure Component Value Units Date/Time    POC Troponin I with Hold Tube [[id number]] Collected: 12/24/22 0023    Specimen: Blood Updated: 12/24/22 0108    Narrative:      The following orders were created for panel order POC Troponin I with Hold Tube.  Procedure                               Abnormality         Status                     ---------                               -----------         ------                     POC Troponin I[[id number]]                                                              HOLD Troponin-I Tube[[id number]]                             Final result                 Please view results for these tests on the individual orders.    CBC & Differential  [[id number]]  (Abnormal) Collected: 12/24/22 0023    Specimen: Blood from Arm, Left Updated: 12/24/22 0050    Narrative:      The following orders were created for panel order CBC & Differential.  Procedure                               Abnormality         Status                     ---------                               -----------         ------                     CBC Auto Differential[[id number]]        Abnormal            Final result                 Please view results for these tests on the individual orders.    Comprehensive Metabolic Panel [[id number]]  (Abnormal) Collected: 12/24/22 0023    Specimen: Blood from Arm, Left Updated: 12/24/22 0115     Glucose 89 mg/dL      BUN 18 mg/dL      Creatinine 1.02 mg/dL      Sodium 137 mmol/L      Potassium 4.1 mmol/L      Chloride 101 mmol/L      CO2 23.5 mmol/L      Calcium 9.3 mg/dL      Total Protein 7.4 g/dL      Albumin 4.40 g/dL      ALT (SGPT) 19 U/L      AST (SGOT) 18 U/L      Alkaline Phosphatase 87 U/L      Total Bilirubin 0.2 mg/dL      Globulin 3.0 gm/dL      A/G Ratio 1.5 g/dL      BUN/Creatinine Ratio 17.6     Anion Gap 12.5 mmol/L      eGFR 72.4 mL/min/1.73      Comment: National Kidney Foundation and American Society of Nephrology (ASN) Task Force recommended calculation based on the Chronic Kidney Disease Epidemiology Collaboration (CKD-EPI) equation refit without adjustment for race.       Narrative:      GFR Normal >60  Chronic Kidney Disease <60  Kidney Failure <15      Lipase [[id number]]  (Normal) Collected: 12/24/22 0023    Specimen: Blood from Arm, Left Updated: 12/24/22 0115     Lipase 31 U/L     BNP [[id number]]  (Normal) Collected: 12/24/22 0023    Specimen: Blood from Arm, Left Updated: 12/24/22 0112     proBNP <36.0 pg/mL     Narrative:      Among patients with dyspnea, NT-proBNP is highly sensitive for the detection of acute congestive heart failure. In addition NT-proBNP of <300 pg/ml effectively rules out acute congestive heart failure  with 99% negative predictive value.      Magnesium [[id number]]  (Normal) Collected: 12/24/22 0023    Specimen: Blood from Arm, Left Updated: 12/24/22 0115     Magnesium 1.8 mg/dL     CBC Auto Differential [[id number]]  (Abnormal) Collected: 12/24/22 0023    Specimen: Blood from Arm, Left Updated: 12/24/22 0050     WBC 10.00 10*3/mm3      RBC 4.58 10*6/mm3      Hemoglobin 14.1 g/dL      Hematocrit 40.9 %      MCV 89.3 fL      MCH 30.8 pg      MCHC 34.5 g/dL      RDW 12.0 %      RDW-SD 39.0 fl      MPV 10.3 fL      Platelets 277 10*3/mm3      Neutrophil % 66.6 %      Lymphocyte % 22.9 %      Monocyte % 7.1 %      Eosinophil % 2.5 %      Basophil % 0.6 %      Immature Grans % 0.3 %      Neutrophils, Absolute 6.66 10*3/mm3      Lymphocytes, Absolute 2.29 10*3/mm3      Monocytes, Absolute 0.71 10*3/mm3      Eosinophils, Absolute 0.25 10*3/mm3      Basophils, Absolute 0.06 10*3/mm3      Immature Grans, Absolute 0.03 10*3/mm3      nRBC 0.0 /100 WBC     POC Troponin I [[id number]]  (Normal) Collected: 12/24/22 0027    Specimen: Blood Updated: 12/24/22 0040     Troponin I 0.00 ng/mL      Comment: Serial Number: [id number]:  [id number]       POC Troponin I with Hold Tube [[id number]] Collected: 12/24/22 0246    Specimen: Blood Updated: 12/24/22 0556    Narrative:      The following orders were created for panel order POC Troponin I with Hold Tube.  Procedure                               Abnormality         Status                     ---------                               -----------         ------                     POC Troponin I[[id number]]                                                              HOLD Troponin-I Tube[[id number]]                                                          Please view results for these tests on the individual orders.    POC Troponin I [[id number]]  (Normal) Collected: 12/24/22 0248    Specimen: Blood Updated: 12/24/22 0300     Troponin I 0.00 ng/mL      Comment: Serial Number: [id number]:   [id number]              Imaging:  XR Chest 1 View    Result Date: 12/24/2022  PROCEDURE: XR CHEST 1 VW  COMPARISON: 1/18/2022.  INDICATIONS: CHEST PAIN.  FINDINGS:  A single AP (or PA) upright portable chest radiograph was performed.  No cardiac enlargement is seen.  No acute infiltrate is appreciated.  No pleural effusion or pneumothorax is identified.  There is interval improvement in lung expansion.  Otherwise, no significant interval change is seen since the prior study (or studies).        No acute infiltrate is appreciated.     Please note that portions of this note were completed with a voice recognition program.  BENJAMIN LACEY JR, MD       Electronically Signed and Approved By: BENJAMIN LACEY JR, MD on 12/24/2022 at 0:55                Procedures:  Procedures    Progress  ED Course as of 12/24/22 2111   Sat Dec 24, 2022   0048 ECG 12 Lead ED Triage Standing Order; Chest Pain  Normal sinus rhythm with rate of 67. QRS normal. SD interval normal. QTc interval is normal. No ST elevation or depression. No T wave abnormalities. No change when compared to jose. This EKG was interpreted by me.  [LD]      ED Course User Index  [LD] Xena Pearson MD   PERC Rule for Pulmonary Embolism - MDCalc  Calculated on Dec 24 2022 9:11 PM  0 criteria -> No need for further workup, as <2% chance of PE. If no criteria are positive and clinician’s pre-test probability is <15%, PERC Rule criteria are satisfied.        HEART Score: 1                  Medical Decision Making:  MDM  Number of Diagnoses or Management Options  Chest pain, unspecified type  Diagnosis management comments: Patient is afebrile and nontoxic appearing.   The patient had an EKG that shows no acute changes. Specifically, there are no ST elevations, t-wave changes of concern, delta waves, or rhythm abnormalities warranting admission. The patient was placed on the cardiac monitor and observed with continuous telemetry. The patient has a chest x-ray that is  negative for pneumothorax, pneumonia, and is essentially unremarkable. The patient has had two negative troponins on blood draw.      The patient is resting comfortably and feels better, is alert and in no distress. The repeat examination is unremarkable and benign. Electrocardiogram shows no signs of acute ischemia and the history, exam, diagnostic testing and current condition did not suggest that this patient is having an acute myocardial infarction, significant arrhythmia, unstable angina, esophageal perforation, pulmonary embolism, aortic dissection, severe pneumonia, sepsis for other significant pathology that would warrant further testing, continued ED treatment, admission, cardiology or other specialist consultation at this point. The vital signs have been stable. Heart score places patient at low risk for major acute cardiac event.      The patient's condition is stable and appropriate for discharge. The patient will pursue further outpatient evaluation with the primary care physician, or designated physician or cardiologist. The patient has expressed a clear and thorough understanding and agreed to follow-up as instructed. Discussed return precautions, discharge instructions and answered all their questions.           Amount and/or Complexity of Data Reviewed  Clinical lab tests: ordered and reviewed  Tests in the radiology section of CPT®: ordered and reviewed    Risk of Complications, Morbidity, and/or Mortality  Presenting problems: low  Management options: low    Patient Progress  Patient progress: stable       Final diagnoses:   Chest pain, unspecified type        Disposition:  ED Disposition     ED Disposition   Discharge    Condition   Stable    Comment   --             This medical record created using voice recognition software.     Documentation assistance provided by Curt Paula acting as scribe for Xena Pearson MD. Information recorded by the scribe was done at my direction and has  been verified and validated by me.          Curt Paula  12/24/22 0047       Xena Pearson MD  12/24/22 9700

## 2022-12-24 NOTE — ED PROVIDER NOTES
Time: 12:21 AM EST  Date of encounter:  2022  Independent Historian/Clinical History and Information was obtained by:   {Blank multiple:59141}  Chief Complaint: ***    History is limited by: {Limited History:37696}    History of Present Illness:  Patient is a 37 y.o. year old female who presents to the emergency department with ***    HPI    Patient Care Team  Primary Care Provider: Rachael Waters APRN    Past Medical History:     No Known Allergies  Past Medical History:   Diagnosis Date   • Foot pain, left    • Heel pain    • Numbness in feet      Past Surgical History:   Procedure Laterality Date   •  SECTION     • COLONOSCOPY     • UPPER GASTROINTESTINAL ENDOSCOPY       Family History   Problem Relation Age of Onset   • Colon cancer Maternal Grandmother         45s       Home Medications:  Prior to Admission medications    Medication Sig Start Date End Date Taking? Authorizing Provider   BD Pen Needle Anna U/F 32G X 4 MM misc  10/11/22   Provider, MD Jocelyn   Liraglutide (SAXENDA) 18 MG/3ML injection pen Inject 0.6mg under skin daily for week one, THEN 1.2mg daily for week two, THEN 1.8mg daily for week three, then 2.4mg daily for week four. 22   Rachael Waters APRN   lisinopril (PRINIVIL,ZESTRIL) 10 MG tablet Take 1 tablet by mouth Daily. 10/4/22   Rachael Waters APRN   ondansetron (Zofran) 8 MG tablet Take 1 tablet by mouth Every 8 (Eight) Hours As Needed for Nausea. 10/4/22   Rachael Waters APRN   pantoprazole (PROTONIX) 20 MG EC tablet Take 1 tablet by mouth Daily. 22   Elsa Casey DO   vitamin D (ERGOCALCIFEROL) 1.25 MG (37546 UT) capsule capsule TAKE 1 CAPSULE BY MOUTH EVERY WEEK 22   Elsa Casey DO        Social History:   Social History     Tobacco Use   • Smoking status: Never   • Smokeless tobacco: Never   Vaping Use   • Vaping Use: Never used   Substance Use Topics   • Alcohol use: Never   • Drug use: Never  "        Review of Systems:  Review of Systems     Physical Exam:  /88 (BP Location: Left arm, Patient Position: Sitting)   Pulse 90   Temp 98.2 °F (36.8 °C) (Oral)   Resp 16   Ht 149.9 cm (59\")   Wt 86.6 kg (190 lb 14.7 oz)   LMP 11/20/2022 (Approximate)   SpO2 100%   BMI 38.56 kg/m²     Physical Exam             Procedures:  Procedures      Medical Decision Making:      Comorbidities that affect care:    {Comorbidities that affect care:86854}    External Notes reviewed:    {External Note review (Optional):07727}      The following orders were placed and all results were independently analyzed by me:  Orders Placed This Encounter   Procedures   • XR Chest 1 View   • [place] Draw   • Comprehensive Metabolic Panel   • Lipase   • BNP   • Magnesium   • CBC Auto Differential   • NPO Diet NPO Type: Strict NPO   • Undress & Gown   • Cardiac Monitoring   • Continuous Pulse Oximetry   • Oxygen Therapy- Nasal Cannula; 2 LPM; Titrate for SPO2: equal to or greater than, 92%   • POC Troponin I   • POC Troponin I   • ECG 12 Lead ED Triage Standing Order; Chest Pain   • ECG 12 Lead ED Triage Standing Order; Chest Pain   • Insert Peripheral IV   • POC Troponin I with Hold Tube   • CBC & Differential   • Green Top (Gel)   • Lavender Top   • Gold Top - SST   • Light Blue Top   • HOLD Troponin-I Tube   • HOLD Troponin-I Tube       Medications Given in the Emergency Department:  Medications   sodium chloride 0.9 % flush 10 mL (has no administration in time range)   aspirin chewable tablet 324 mg (has no administration in time range)        ED Course:         Labs:    Lab Results (last 24 hours)     ** No results found for the last 24 hours. **           Imaging:    No Radiology Exams Resulted Within Past 24 Hours      Differential Diagnosis and Discussion:    {Differentials:96530}    {Independent Review of (Optional):94028}    MDM     {Critical Care:31144}    Patient Care Considerations:    {Considerations " (Optional):00989}      Consultants/Shared Management Plan:    {Shared Management Plan (Optional):57862}    Social Determinants of Health:    {Social Determinants of Health (Optional):07665}      Disposition and Care Coordination:    {Admission consideration:63715}    {Discharge (Optional):00307}    Final diagnoses:   None        ED Disposition     None          This medical record created using voice recognition software.

## 2022-12-25 LAB
QT INTERVAL: 378 MS
QT INTERVAL: 426 MS

## 2023-02-01 ENCOUNTER — LAB (OUTPATIENT)
Dept: LAB | Facility: HOSPITAL | Age: 38
End: 2023-02-01
Payer: COMMERCIAL

## 2023-02-01 ENCOUNTER — HOSPITAL ENCOUNTER (OUTPATIENT)
Dept: GENERAL RADIOLOGY | Facility: HOSPITAL | Age: 38
Discharge: HOME | End: 2023-02-01
Payer: COMMERCIAL

## 2023-02-01 ENCOUNTER — OFFICE VISIT (OUTPATIENT)
Dept: FAMILY MEDICINE CLINIC | Facility: CLINIC | Age: 38
End: 2023-02-01
Payer: COMMERCIAL

## 2023-02-01 VITALS
DIASTOLIC BLOOD PRESSURE: 75 MMHG | OXYGEN SATURATION: 99 % | SYSTOLIC BLOOD PRESSURE: 114 MMHG | WEIGHT: 192.4 LBS | BODY MASS INDEX: 38.79 KG/M2 | HEIGHT: 59 IN | TEMPERATURE: 98.4 F | HEART RATE: 63 BPM

## 2023-02-01 DIAGNOSIS — M54.50 ACUTE RIGHT-SIDED LOW BACK PAIN WITHOUT SCIATICA: ICD-10-CM

## 2023-02-01 DIAGNOSIS — M54.50 ACUTE RIGHT-SIDED LOW BACK PAIN WITHOUT SCIATICA: Primary | ICD-10-CM

## 2023-02-01 DIAGNOSIS — R10.9 RIGHT FLANK PAIN: ICD-10-CM

## 2023-02-01 LAB
BILIRUB BLD-MCNC: NEGATIVE MG/DL
CLARITY, POC: CLEAR
COLOR UR: YELLOW
EXPIRATION DATE: NORMAL
GLUCOSE UR STRIP-MCNC: NEGATIVE MG/DL
KETONES UR QL: NEGATIVE
LEUKOCYTE EST, POC: NEGATIVE
Lab: NORMAL
NITRITE UR-MCNC: NEGATIVE MG/ML
PH UR: 6.5 [PH] (ref 5–8)
PROT UR STRIP-MCNC: NEGATIVE MG/DL
RBC # UR STRIP: NEGATIVE /UL
SP GR UR: 1.02 (ref 1–1.03)
UROBILINOGEN UR QL: NORMAL

## 2023-02-01 PROCEDURE — 81003 URINALYSIS AUTO W/O SCOPE: CPT

## 2023-02-01 PROCEDURE — 80053 COMPREHEN METABOLIC PANEL: CPT

## 2023-02-01 PROCEDURE — 99214 OFFICE O/P EST MOD 30 MIN: CPT

## 2023-02-01 PROCEDURE — 87086 URINE CULTURE/COLONY COUNT: CPT

## 2023-02-01 PROCEDURE — 74018 RADEX ABDOMEN 1 VIEW: CPT

## 2023-02-01 PROCEDURE — 36415 COLL VENOUS BLD VENIPUNCTURE: CPT

## 2023-02-01 RX ORDER — CYCLOBENZAPRINE HCL 10 MG
10 TABLET ORAL 3 TIMES DAILY PRN
Qty: 21 TABLET | Refills: 0 | Status: SHIPPED | OUTPATIENT
Start: 2023-02-01 | End: 2023-02-08

## 2023-02-01 RX ORDER — ERGOCALCIFEROL 1.25 MG/1
50000 CAPSULE ORAL WEEKLY
Qty: 12 CAPSULE | Refills: 0 | Status: SHIPPED | OUTPATIENT
Start: 2023-02-01

## 2023-02-01 NOTE — PROGRESS NOTES
"Chief Complaint   Patient presents with   • Back Pain     Low right side of back pain since yesterday morning. Hurts to sit and was hard to get out of bed yesterday morning.    • Flank Pain     Right side.        Subjective          Neha May presents to [facility]    History of Present Illness  She is here to be seen for back pain that she has had since yesterday morning. She has low right sided pain that is in the flank area and also around to her side a bit.   -has taken ibuprofen and is unsure if it helped much.         Past History:  Medical History: has a past medical history of Foot pain, left, Heel pain, and Numbness in feet.   Surgical History: has a past surgical history that includes  section (); Upper gastrointestinal endoscopy; and Colonoscopy.   Family History: family history includes Colon cancer in her maternal grandmother.   Social History: reports that she has never smoked. She has been exposed to tobacco smoke. She has never used smokeless tobacco. She reports that she does not drink alcohol and does not use drugs.  Allergies: Patient has no known allergies.  (Not in a hospital admission)       Social History     Socioeconomic History   • Marital status:    Tobacco Use   • Smoking status: Never     Passive exposure: Past   • Smokeless tobacco: Never   Vaping Use   • Vaping Use: Never used   Substance and Sexual Activity   • Alcohol use: Never   • Drug use: Never   • Sexual activity: Yes     Partners: Male     Birth control/protection: Tubal ligation       Health Maintenance Due   Topic Date Due   • HEPATITIS C SCREENING  Never done       Objective     Vital Signs:   /75 (BP Location: Left arm, Patient Position: Sitting)   Pulse 63   Temp 98.4 °F (36.9 °C) (Infrared)   Ht 149.9 cm (59\")   Wt 87.3 kg (192 lb 6.4 oz)   SpO2 99%   BMI 38.86 kg/m²       Physical Exam  Constitutional:       Appearance: Normal appearance.   HENT: "      Nose: Nose normal.      Mouth/Throat:      Mouth: Mucous membranes are moist.   Cardiovascular:      Rate and Rhythm: Normal rate.      Pulses: Normal pulses.   Pulmonary:      Effort: Pulmonary effort is normal.   Skin:     General: Skin is warm and dry.   Neurological:      General: No focal deficit present.      Mental Status: She is alert and oriented to person, place, and time.   Psychiatric:         Mood and Affect: Mood normal.         Behavior: Behavior normal.          Review of Systems   Genitourinary: Positive for flank pain.   Musculoskeletal: Positive for back pain.        Result Review :                 Assessment and Plan    Diagnoses and all orders for this visit:    1. Acute right-sided low back pain without sciatica (Primary)  -     POCT urinalysis dipstick, automated  -     Comprehensive metabolic panel; Future  -     cyclobenzaprine (FLEXERIL) 10 MG tablet; Take 1 tablet by mouth 3 (Three) Times a Day As Needed for Muscle Spasms for up to 7 days.  Dispense: 21 tablet; Refill: 0    2. Right flank pain  -     XR Abdomen KUB; Future  -     Urine Culture - Urine, Urine, Clean Catch; Future  -     cyclobenzaprine (FLEXERIL) 10 MG tablet; Take 1 tablet by mouth 3 (Three) Times a Day As Needed for Muscle Spasms for up to 7 days.  Dispense: 21 tablet; Refill: 0  -     Urine Culture - Urine, Urine, Clean Catch    Other orders  -     vitamin D (ERGOCALCIFEROL) 1.25 MG (77848 UT) capsule capsule; Take 1 capsule by mouth 1 (One) Time Per Week.  Dispense: 12 capsule; Refill: 0      I spent 40 minutes caring for Neha on this date of service. This time includes time spent by me in the following activities:preparing for the visit, reviewing tests, obtaining and/or reviewing a separately obtained history, performing a medically appropriate examination and/or evaluation , counseling and educating the patient/family/caregiver, ordering medications, tests, or procedures and documenting information in the  medical record    Pt thought to be clinically stable at this time.    Follow Up   No follow-ups on file.  Patient was given instructions and counseling regarding her condition or for health maintenance advice. Please see specific information pulled into the AVS if appropriate.

## 2023-02-02 DIAGNOSIS — N20.0 KIDNEY STONES: Primary | ICD-10-CM

## 2023-02-02 LAB
ALBUMIN SERPL-MCNC: 4.3 G/DL (ref 3.5–5.2)
ALBUMIN/GLOB SERPL: 1.5 G/DL
ALP SERPL-CCNC: 69 U/L (ref 39–117)
ALT SERPL W P-5'-P-CCNC: 19 U/L (ref 1–33)
ANION GAP SERPL CALCULATED.3IONS-SCNC: 12.1 MMOL/L (ref 5–15)
AST SERPL-CCNC: 15 U/L (ref 1–32)
BACTERIA SPEC AEROBE CULT: NO GROWTH
BILIRUB SERPL-MCNC: 0.2 MG/DL (ref 0–1.2)
BUN SERPL-MCNC: 15 MG/DL (ref 6–20)
BUN/CREAT SERPL: 18.3 (ref 7–25)
CALCIUM SPEC-SCNC: 9.5 MG/DL (ref 8.6–10.5)
CHLORIDE SERPL-SCNC: 102 MMOL/L (ref 98–107)
CO2 SERPL-SCNC: 23.9 MMOL/L (ref 22–29)
CREAT SERPL-MCNC: 0.82 MG/DL (ref 0.57–1)
EGFRCR SERPLBLD CKD-EPI 2021: 94 ML/MIN/1.73
GLOBULIN UR ELPH-MCNC: 2.8 GM/DL
GLUCOSE SERPL-MCNC: 75 MG/DL (ref 65–99)
POTASSIUM SERPL-SCNC: 4.3 MMOL/L (ref 3.5–5.2)
PROT SERPL-MCNC: 7.1 G/DL (ref 6–8.5)
SODIUM SERPL-SCNC: 138 MMOL/L (ref 136–145)

## 2023-02-02 RX ORDER — TAMSULOSIN HYDROCHLORIDE 0.4 MG/1
1 CAPSULE ORAL DAILY
Qty: 7 CAPSULE | Refills: 0 | Status: SHIPPED | OUTPATIENT
Start: 2023-02-02 | End: 2023-02-09

## 2023-02-02 NOTE — PROGRESS NOTES
Small cluster of calcifications seen in the right kidney suggestive of kidney stones. Looks to be multiple small stones that should pass on their own. I will send in Flomax for her to start on.

## 2023-02-08 ENCOUNTER — OFFICE VISIT (OUTPATIENT)
Dept: FAMILY MEDICINE CLINIC | Facility: CLINIC | Age: 38
End: 2023-02-08
Payer: COMMERCIAL

## 2023-02-08 VITALS
BODY MASS INDEX: 39.84 KG/M2 | SYSTOLIC BLOOD PRESSURE: 115 MMHG | HEART RATE: 72 BPM | OXYGEN SATURATION: 99 % | HEIGHT: 59 IN | DIASTOLIC BLOOD PRESSURE: 80 MMHG | TEMPERATURE: 97.5 F | WEIGHT: 197.6 LBS

## 2023-02-08 DIAGNOSIS — E66.9 OBESITY (BMI 30-39.9): ICD-10-CM

## 2023-02-08 DIAGNOSIS — N20.0 NEPHROLITHIASIS: ICD-10-CM

## 2023-02-08 DIAGNOSIS — Z11.59 NEED FOR HEPATITIS C SCREENING TEST: Primary | ICD-10-CM

## 2023-02-08 DIAGNOSIS — I10 BENIGN ESSENTIAL HTN: ICD-10-CM

## 2023-02-08 PROCEDURE — 99214 OFFICE O/P EST MOD 30 MIN: CPT | Performed by: NURSE PRACTITIONER

## 2023-02-08 RX ORDER — SEMAGLUTIDE 0.25 MG/.5ML
0.25 INJECTION, SOLUTION SUBCUTANEOUS WEEKLY
Qty: 2 ML | Refills: 3 | Status: SHIPPED | OUTPATIENT
Start: 2023-02-08 | End: 2023-04-02 | Stop reason: DRUGHIGH

## 2023-02-08 NOTE — ASSESSMENT & PLAN NOTE
Patient's (Body mass index is 39.91 kg/m².) indicates that they are obese (BMI >30) with health conditions that include hypertension . Weight is unchanged. BMI  is above average; BMI management plan is completed. We discussed portion control, increasing exercise and pharmacologic options including Wegovy, DC Saxenda. .

## 2023-02-08 NOTE — PROGRESS NOTES
"Chief Complaint  Obesity (Pt states she feels like the saxenda makes her feel bloated and cause headaches.she wants to try a medication that is not taken everyday.pt has tried phentermine in the past and caused headaches as well.) and kidney stones (Pt had xray done on 2/01/2023 and found kidney stones, she states the pain is better but still uncomfortable.)    Subjective        Neha May presents to [facility]  History of Present Illness  Pt presents with continues c/o obesity. States she has not been taking Saxenda consistently. States she does not like having to take a daily injection and feels as though it causes bloating. She has also tried phentermine in the past which caused HA. Pt continues with diet and exercise mods without success.     Pt continues to c/o low abd/low back discomfort. 2/1 KUB revealed:   There is a cluster of calcifications projecting over the lower right kidney, measuring up to 5-6 mm   each.  No definite calcifications are noted over the left kidney or expected courses of the   ureters.  There are probable phleboliths noted in the pelvis, which appears similar to the prior   study.  There is a nonobstructed bowel gas pattern.  Right colonic fecal retention is noted.     Pt was placed on Flomax for treatment. Denies fever, chills, N/V/D, SOB, dizziness, or other.     Reviewed all recent labs and medications.      Objective   Vital Signs:  /80   Pulse 72   Temp 97.5 °F (36.4 °C) (Temporal)   Ht 149.9 cm (59\")   Wt 89.6 kg (197 lb 9.6 oz)   SpO2 99%   BMI 39.91 kg/m²   Estimated body mass index is 39.91 kg/m² as calculated from the following:    Height as of this encounter: 149.9 cm (59\").    Weight as of this encounter: 89.6 kg (197 lb 9.6 oz).             Physical Exam  Vitals reviewed.   Constitutional:       General: She is not in acute distress.     Appearance: Normal appearance.   HENT:      Head: Normocephalic.      Right Ear: " [facility] - Christofer Isaacs, Sorin, Marisa, Jr, Sandro Hagen  Office Number:  [phone]    Patient resting comfortably in bed in NAD.  Laying flat with no respiratory distress.  Offers no complaints.    F/U for:  CHF    Telemetry:  NSR    MEDICATIONS  (STANDING):  acetaminophen   Tablet .. 650 milliGRAM(s) Oral once  ALBUTerol    0.083% 2.5 milliGRAM(s) Nebulizer daily  carvedilol 12.5 milliGRAM(s) Oral every 12 hours  cholecalciferol 1000 Unit(s) Oral daily  cinacalcet 30 milliGRAM(s) Oral <User Schedule>  doxazosin 4 milliGRAM(s) Oral <User Schedule>  ferrous    sulfate 325 milliGRAM(s) Oral daily  furosemide    Tablet 40 milliGRAM(s) Oral every 12 hours  guaiFENesin  milliGRAM(s) Oral <User Schedule>  heparin  Injectable 5000 Unit(s) SubCutaneous every 12 hours  hydrALAZINE 75 milliGRAM(s) Oral every 8 hours    MEDICATIONS  (PRN):  acetaminophen   Tablet .. 650 milliGRAM(s) Oral every 6 hours PRN Temp greater or equal to 38C (100.4F), Mild Pain (1 - 3)      Allergies    Vasotec (Unknown)          Vital Signs Last 24 Hrs  T(C): 36.4 (13 Nov 2018 04:04), Max: 37.2 (12 Nov 2018 19:55)  T(F): 97.5 (13 Nov 2018 04:04), Max: 99 (12 Nov 2018 19:55)  HR: 84 (13 Nov 2018 04:04) (57 - 84)  BP: 163/71 (13 Nov 2018 04:04) (105/58 - 163/71)  BP(mean): --  RR: 18 (13 Nov 2018 04:04) (17 - 18)  SpO2: 92% (13 Nov 2018 04:04) (92% - 96%)    I&O's Summary    11 Nov 2018 07:01  -  12 Nov 2018 07:00  --------------------------------------------------------  IN: 0 mL / OUT: 300 mL / NET: -300 mL    12 Nov 2018 07:01  -  13 Nov 2018 06:14  --------------------------------------------------------  IN: 200 mL / OUT: 1020 mL / NET: -820 mL        ON EXAM:    General: NAD, awake and alert  HEENT: Mucous membranes are moist, anicteric  Lungs: Non-labored, breath sounds are clear bilaterally, No wheezing, rales or rhonchi  Cardiovascular: Regular, S1 and S2, 3/6 systolic murmur  Gastrointestinal: Bowel Sounds present, soft, nontender.   Lymph: No peripheral edema. No lymphadenopathy.  Skin: No rashes or ulcers  Psych:  Mood & affect appropriate    LABS: All Labs Reviewed:                        8.9    x     )-----------( x        ( 12 Nov 2018 14:13 )             27.2                         7.6    9.41  )-----------( 268      ( 12 Nov 2018 06:34 )             23.1                         8.4    9.10  )-----------( 288      ( 11 Nov 2018 06:47 )             25.9     12 Nov 2018 06:34    134    |  99     |  42     ----------------------------<  97     4.5     |  29     |  1.30   11 Nov 2018 06:47    134    |  94     |  38     ----------------------------<  102    3.5     |  30     |  1.10   10 Nov 2018 09:10    132    |  95     |  36     ----------------------------<  126    3.7     |  29     |  1.10     Ca    7.6        12 Nov 2018 06:34  Ca    7.8        11 Nov 2018 06:47  Ca    7.6        10 Nov 2018 09:10  Mg     2.2       12 Nov 2018 06:34  Mg     1.7       11 Nov 2018 06:47    TPro  5.6    /  Alb  2.1    /  TBili  0.4    /  DBili  .10    /  AST  17     /  ALT  16     /  AlkPhos  50     12 Nov 2018 06:34          Blood Culture: Organism --  Gram Stain Blood -- Gram Stain --  Specimen Source .Blood Blood  Culture-Blood --    Organism Enterococcus faecium (vancomycin resistant)  Gram Stain Blood -- Gram Stain --  Specimen Source .Urine Clean Catch (Midstream)  Culture-Blood -- Tympanic membrane normal.      Left Ear: Tympanic membrane normal.      Nose: Nose normal.      Mouth/Throat:      Pharynx: Oropharynx is clear. No posterior oropharyngeal erythema.   Eyes:      General: No scleral icterus.     Extraocular Movements: Extraocular movements intact.      Conjunctiva/sclera: Conjunctivae normal.      Pupils: Pupils are equal, round, and reactive to light.   Cardiovascular:      Rate and Rhythm: Normal rate and regular rhythm.      Pulses: Normal pulses.      Heart sounds: Normal heart sounds.   Pulmonary:      Effort: Pulmonary effort is normal.      Breath sounds: Normal breath sounds.   Abdominal:      General: Bowel sounds are normal.      Palpations: Abdomen is soft.   Musculoskeletal:         General: Normal range of motion.      Cervical back: Neck supple.      Lumbar back: Tenderness present.      Comments: Mild R flank pain    Skin:     General: Skin is warm and dry.   Neurological:      Mental Status: She is alert and oriented to person, place, and time.   Psychiatric:         Mood and Affect: Mood normal.         Behavior: Behavior normal.         Thought Content: Thought content normal.         Judgment: Judgment normal.        Result Review :    Common labs    Common Labs 12/24/22 12/24/22 2/1/23    0023 0023    Glucose  89 75   BUN  18 15   Creatinine  1.02 (A) 0.82   Sodium  137 138   Potassium  4.1 4.3   Chloride  101 102   Calcium  9.3 9.5   Albumin  4.40 4.3   Total Bilirubin  0.2 0.2   Alkaline Phosphatase  87 69   AST (SGOT)  18 15   ALT (SGPT)  19 19   WBC 10.00     Hemoglobin 14.1     Hematocrit 40.9     Platelets 277     (A) Abnormal value            Data reviewed: Radiologic studies KUB and Consultant notes OB/GYN              Assessment and Plan   Diagnoses and all orders for this visit:    1. Need for hepatitis C screening test (Primary)    2. Nephrolithiasis  Comments:  Order for CT abd/pelvis to better characterize  Proceed to ED if S/S worsen or become  severe  Tyl/Ibu UAD prn pain  Increase clear fluids   Orders:  -     CT Abdomen Pelvis Without Contrast; Future    3. Obesity (BMI 30-39.9)  Assessment & Plan:  Patient's (Body mass index is 39.91 kg/m².) indicates that they are obese (BMI >30) with health conditions that include hypertension . Weight is unchanged. BMI  is above average; BMI management plan is completed. We discussed portion control, increasing exercise and pharmacologic options including Wegovy, DC Saxenda. .       4. Benign essential HTN  Assessment & Plan:  Hypertension is improving with treatment.  Continue current treatment regimen.  Dietary sodium restriction.  Weight loss.  Regular aerobic exercise.  Blood pressure will be reassessed at the next regular appointment.      Other orders  -     Semaglutide-Weight Management (Wegovy) 0.25 MG/0.5ML solution auto-injector; Inject 0.25 mg under the skin into the appropriate area as directed 1 (One) Time Per Week.  Dispense: 2 mL; Refill: 3           Follow Up   Return in about 4 weeks (around 3/8/2023), or if symptoms worsen or fail to improve.  Patient was given instructions and counseling regarding her condition or for health maintenance advice. Please see specific information pulled into the AVS if appropriate.

## 2023-02-13 RX ORDER — PANTOPRAZOLE SODIUM 20 MG/1
TABLET, DELAYED RELEASE ORAL
Qty: 30 TABLET | Refills: 3 | Status: SHIPPED | OUTPATIENT
Start: 2023-02-13

## 2023-02-15 ENCOUNTER — HOSPITAL ENCOUNTER (OUTPATIENT)
Dept: CT IMAGING | Facility: HOSPITAL | Age: 38
Discharge: HOME | End: 2023-02-15
Payer: COMMERCIAL

## 2023-02-24 ENCOUNTER — TELEPHONE (OUTPATIENT)
Dept: GASTROENTEROLOGY | Facility: CLINIC | Age: 38
End: 2023-02-24
Payer: COMMERCIAL

## 2023-02-24 NOTE — TELEPHONE ENCOUNTER
Called and spoke with patient regarding scheduling an appointment with Jess Singleton. Patient agreed to be scheduled and was scheduled for 9/13/23. Patient verbalized understanding.

## 2023-03-14 ENCOUNTER — TELEPHONE (OUTPATIENT)
Dept: FAMILY MEDICINE CLINIC | Facility: CLINIC | Age: 38
End: 2023-03-14
Payer: COMMERCIAL
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
## 2023-03-28 ENCOUNTER — TELEPHONE (OUTPATIENT)
Dept: FAMILY MEDICINE CLINIC | Facility: CLINIC | Age: 38
End: 2023-03-28
Payer: COMMERCIAL

## 2023-03-28 NOTE — TELEPHONE ENCOUNTER
Caller: Neha May    Relationship: Self    Best call back number: [phone]    What medications are you currently taking:   Current Outpatient Medications on File Prior to Visit   Medication Sig Dispense Refill   • lisinopril (PRINIVIL,ZESTRIL) 10 MG tablet Take 1 tablet by mouth Daily. 30 tablet 5   • ondansetron (Zofran) 8 MG tablet Take 1 tablet by mouth Every 8 (Eight) Hours As Needed for Nausea. 20 tablet 0   • pantoprazole (PROTONIX) 20 MG EC tablet TAKE 1 TABLET BY MOUTH ONCE DAILY FOR STOMACH/REFLUX 30 tablet 3   • Semaglutide-Weight Management (Wegovy) 0.25 MG/0.5ML solution auto-injector Inject 0.25 mg under the skin into the appropriate area as directed 1 (One) Time Per Week. 2 mL 3   • vitamin D (ERGOCALCIFEROL) 1.25 MG (36017 UT) capsule capsule Take 1 capsule by mouth 1 (One) Time Per Week. 12 capsule 0     No current facility-administered medications on file prior to visit.            Which medication are you concerned about: Semaglutide-Weight Management (Wegovy) 0.25 MG/0.5ML solution auto-injector    Who prescribed you this medication: SHELLEY POSADA    What are your concerns: PATIENT IS WANTING TO KNOW IF THE DOSAGE AMOUNT CAN BE CHANGED

## 2023-03-29 NOTE — TELEPHONE ENCOUNTER
Spoke with patient, she needed to follow up in 4 weeks from last visit for weight loss.  I made her an appt for Friday of this week.  She can discuss dose increase at that time.

## 2023-03-31 ENCOUNTER — CLINICAL SUPPORT (OUTPATIENT)
Dept: FAMILY MEDICINE CLINIC | Facility: CLINIC | Age: 38
End: 2023-03-31
Payer: COMMERCIAL

## 2023-03-31 VITALS — WEIGHT: 187.2 LBS | BODY MASS INDEX: 37.81 KG/M2

## 2023-03-31 DIAGNOSIS — E66.9 OBESITY (BMI 30-39.9): ICD-10-CM

## 2023-04-02 RX ORDER — SEMAGLUTIDE 0.5 MG/.5ML
0.5 INJECTION, SOLUTION SUBCUTANEOUS WEEKLY
Qty: 2 ML | Refills: 2 | Status: SHIPPED | OUTPATIENT
Start: 2023-04-02

## 2023-04-07 ENCOUNTER — HOSPITAL ENCOUNTER (OUTPATIENT)
Dept: CT IMAGING | Facility: HOSPITAL | Age: 38
Discharge: HOME | End: 2023-04-07
Admitting: NURSE PRACTITIONER
Payer: COMMERCIAL

## 2023-04-07 DIAGNOSIS — N20.0 NEPHROLITHIASIS: ICD-10-CM

## 2023-04-07 PROCEDURE — 74176 CT ABD & PELVIS W/O CONTRAST: CPT

## 2023-04-10 DIAGNOSIS — R10.9 RIGHT FLANK PAIN: Primary | ICD-10-CM

## 2023-05-01 RX ORDER — LISINOPRIL 10 MG/1
TABLET ORAL
Qty: 30 TABLET | Refills: 4 | Status: SHIPPED | OUTPATIENT
Start: 2023-05-01

## 2023-05-22 ENCOUNTER — TELEPHONE (OUTPATIENT)
Dept: FAMILY MEDICINE CLINIC | Facility: CLINIC | Age: 38
End: 2023-05-22
Payer: COMMERCIAL

## 2023-05-22 NOTE — TELEPHONE ENCOUNTER
Caller: Neha May    Relationship: Self    Best call back number: [phone]    What is the best time to reach you: ANYTIME    Who are you requesting to speak with (clinical staff, provider,  specific staff member): NURSE     What was the call regarding: PATIENT STATED THAT SHE IS HAVING TROUBLE WITH BOWEL MOVEMENT WITH USING THE WEGOVY AND WANTED TO SEE IF SOMETHING COULD BE CALLED IN TO HELP WITH ISSUE     Do you require a callback: YES

## 2023-05-23 NOTE — TELEPHONE ENCOUNTER
Patient is having blood and burning when having a bowl movement.  Thinks it could be an anal fissure.

## 2023-05-23 NOTE — TELEPHONE ENCOUNTER
Spoke with patient, she is already established with a gastro doctor. I advised she reach out to their office to make an appt for a new problem.  She understood.

## 2023-05-30 RX ORDER — SEMAGLUTIDE 1 MG/.5ML
1 INJECTION, SOLUTION SUBCUTANEOUS WEEKLY
Qty: 2 ML | Refills: 0 | Status: SHIPPED | OUTPATIENT
Start: 2023-05-30

## 2023-05-31 RX ORDER — ONDANSETRON HYDROCHLORIDE 8 MG/1
8 TABLET, FILM COATED ORAL EVERY 8 HOURS PRN
Qty: 20 TABLET | Refills: 0 | Status: SHIPPED | OUTPATIENT
Start: 2023-05-31

## 2023-05-31 NOTE — TELEPHONE ENCOUNTER
Caller: Lalo Neha Stark    Relationship: Self    Best call back number: [phone]    Requested Prescriptions:   Requested Prescriptions     Pending Prescriptions Disp Refills   • ondansetron (Zofran) 8 MG tablet 20 tablet 0     Sig: Take 1 tablet by mouth Every 8 (Eight) Hours As Needed for Nausea.        Pharmacy where request should be sent: [address] [phone]  - [phone] [facility]     Last office visit with prescribing clinician: 2/8/2023   Last telemedicine visit with prescribing clinician: Visit date not found   Next office visit with prescribing clinician: Visit date not found     Additional details provided by patient: PATIENT IS NOT SURE WHERE THE FIRST PRESCRIPTION WENT AND WANTED TO SEE IF SHE COULD GET ANOTHER ONE SENT IN.     Does the patient have less than a 3 day supply:  [x] Yes  [] No    Would you like a call back once the refill request has been completed: [x] Yes [] No    If the office needs to give you a call back, can they leave a voicemail: [x] Yes [] No    Bere Gneao Rep   05/31/23 09:46 EDT

## 2023-06-02 ENCOUNTER — OFFICE VISIT (OUTPATIENT)
Dept: FAMILY MEDICINE CLINIC | Facility: CLINIC | Age: 38
End: 2023-06-02

## 2023-06-02 VITALS
HEART RATE: 69 BPM | SYSTOLIC BLOOD PRESSURE: 126 MMHG | DIASTOLIC BLOOD PRESSURE: 83 MMHG | OXYGEN SATURATION: 99 % | HEIGHT: 59 IN | WEIGHT: 179.3 LBS | TEMPERATURE: 98.2 F | BODY MASS INDEX: 36.15 KG/M2

## 2023-06-02 DIAGNOSIS — J02.0 STREP THROAT: ICD-10-CM

## 2023-06-02 DIAGNOSIS — R21 RASH: ICD-10-CM

## 2023-06-02 DIAGNOSIS — I10 BENIGN ESSENTIAL HTN: ICD-10-CM

## 2023-06-02 DIAGNOSIS — R52 GENERALIZED BODY ACHES: Primary | ICD-10-CM

## 2023-06-02 DIAGNOSIS — R05.9 COUGH, UNSPECIFIED TYPE: ICD-10-CM

## 2023-06-02 DIAGNOSIS — E66.9 OBESITY (BMI 30-39.9): ICD-10-CM

## 2023-06-02 DIAGNOSIS — J02.9 SORETHROAT: ICD-10-CM

## 2023-06-02 DIAGNOSIS — L23.7 POISON IVY DERMATITIS: ICD-10-CM

## 2023-06-02 LAB
EXPIRATION DATE: ABNORMAL
EXPIRATION DATE: NORMAL
EXPIRATION DATE: NORMAL
FLUAV AG NPH QL: NEGATIVE
FLUBV AG NPH QL: NEGATIVE
INTERNAL CONTROL: ABNORMAL
INTERNAL CONTROL: NORMAL
INTERNAL CONTROL: NORMAL
Lab: ABNORMAL
Lab: NORMAL
Lab: NORMAL
S PYO AG THROAT QL: POSITIVE
SARS-COV-2 AG UPPER RESP QL IA.RAPID: NOT DETECTED

## 2023-06-02 PROCEDURE — 87426 SARSCOV CORONAVIRUS AG IA: CPT | Performed by: NURSE PRACTITIONER

## 2023-06-02 PROCEDURE — 99214 OFFICE O/P EST MOD 30 MIN: CPT | Performed by: NURSE PRACTITIONER

## 2023-06-02 PROCEDURE — 87880 STREP A ASSAY W/OPTIC: CPT | Performed by: NURSE PRACTITIONER

## 2023-06-02 PROCEDURE — 87804 INFLUENZA ASSAY W/OPTIC: CPT | Performed by: NURSE PRACTITIONER

## 2023-06-02 RX ORDER — TRIAMCINOLONE ACETONIDE 40 MG/ML
60 INJECTION, SUSPENSION INTRA-ARTICULAR; INTRAMUSCULAR ONCE
Status: COMPLETED | OUTPATIENT
Start: 2023-06-02 | End: 2023-06-02

## 2023-06-02 RX ORDER — AMOXICILLIN 500 MG/1
500 CAPSULE ORAL 2 TIMES DAILY
Qty: 20 CAPSULE | Refills: 0 | Status: SHIPPED | OUTPATIENT
Start: 2023-06-02 | End: 2023-06-12

## 2023-06-02 RX ADMIN — TRIAMCINOLONE ACETONIDE 60 MG: 40 INJECTION, SUSPENSION INTRA-ARTICULAR; INTRAMUSCULAR at 09:44

## 2023-06-02 NOTE — ASSESSMENT & PLAN NOTE
Patient's (Body mass index is 36.21 kg/m².) indicates that they are obese (BMI >30) with health conditions that include hypertension . Weight is improving with treatment. BMI  is above average; BMI management plan is completed. We discussed portion control, increasing exercise and pharmacologic options including wegovy.

## 2023-06-02 NOTE — PROGRESS NOTES
"Chief Complaint  Cough (Symptoms started 3 days ago ), Sore Throat, Generalized Body Aches, and poison eloina (Rash appeared a few days ago.)    Subjective        Neha May presents to [facility]  History of Present Illness  Pt presents c/o cough, nasal congestion, sore throat, HA, myalgia x 3 days. Denies fever, chills, SOB, chest pain, nausea, vomiting, diarrhea, constipation, or other. Has not taken anything to treat. No known sick contacts. Pt also c/o possible poison ivy rash appearing 2 days ago. Pt states she gets freq, has been working outside for the past week.     Reviewed all recent labs and medications.      Objective   Vital Signs:  /83   Pulse 69   Temp 98.2 °F (36.8 °C) (Temporal)   Ht 149.9 cm (59\")   Wt 81.3 kg (179 lb 4.8 oz)   SpO2 99%   BMI 36.21 kg/m²   Estimated body mass index is 36.21 kg/m² as calculated from the following:    Height as of this encounter: 149.9 cm (59\").    Weight as of this encounter: 81.3 kg (179 lb 4.8 oz).             Physical Exam  Vitals reviewed.   Constitutional:       General: She is not in acute distress.     Appearance: Normal appearance.   HENT:      Head: Normocephalic.      Right Ear: Tympanic membrane normal.      Left Ear: Tympanic membrane normal.      Nose: Congestion present.      Mouth/Throat:      Pharynx: Oropharynx is clear. Posterior oropharyngeal erythema present.   Eyes:      General: No scleral icterus.     Extraocular Movements: Extraocular movements intact.      Conjunctiva/sclera: Conjunctivae normal.      Pupils: Pupils are equal, round, and reactive to light.   Cardiovascular:      Rate and Rhythm: Normal rate and regular rhythm.      Pulses: Normal pulses.      Heart sounds: Normal heart sounds.   Pulmonary:      Effort: Pulmonary effort is normal.      Breath sounds: Normal breath sounds.   Abdominal:      General: Bowel sounds are normal.      Palpations: Abdomen is soft.   Musculoskeletal: "         General: Normal range of motion.      Cervical back: Neck supple.   Skin:     General: Skin is warm and dry.      Findings: Rash present.      Comments: L thigh 2 papular areas, 2-3 cm erythemic   Neurological:      Mental Status: She is alert and oriented to person, place, and time.   Psychiatric:         Mood and Affect: Mood normal.         Behavior: Behavior normal.         Thought Content: Thought content normal.         Judgment: Judgment normal.        Result Review :    Common labs        12/24/2022    00:23 2/1/2023    15:58   Common Labs   Glucose 89   75     BUN 18   15     Creatinine 1.02   0.82     Sodium 137   138     Potassium 4.1   4.3     Chloride 101   102     Calcium 9.3   9.5     Albumin 4.40   4.3     Total Bilirubin 0.2   0.2     Alkaline Phosphatase 87   69     AST (SGOT) 18   15     ALT (SGPT) 19   19     WBC 10.00      Hemoglobin 14.1      Hematocrit 40.9      Platelets 277        Data reviewed: Consultant notes nephrology              Assessment and Plan   Diagnoses and all orders for this visit:    1. Generalized body aches (Primary)  -     POCT rapid strep A  -     POCT SARS-CoV-2 Antigen ENRIQUE  -     POCT Influenza A/B    2. Cough, unspecified type  -     POCT SARS-CoV-2 Antigen ENRIQUE  -     POCT Influenza A/B    3. Sorethroat  Comments:  strep A positive   salt water gargles TID PRN   Orders:  -     POCT rapid strep A  -     POCT SARS-CoV-2 Antigen ENRIQUE  -     POCT Influenza A/B    4. Rash  Comments:  disc poison ivy vs strep rash   likely poison ivy     5. Obesity (BMI 30-39.9)  Assessment & Plan:  Patient's (Body mass index is 36.21 kg/m².) indicates that they are obese (BMI >30) with health conditions that include hypertension . Weight is improving with treatment. BMI  is above average; BMI management plan is completed. We discussed portion control, increasing exercise and pharmacologic options including wegovy.       6. Benign essential HTN  Assessment & Plan:  Hypertension is  improving with treatment.  Continue current treatment regimen.  Dietary sodium restriction.  Blood pressure will be reassessed at the next regular appointment.      7. Strep throat  Comments:  amox 500mg PO bid x 10 days   Increas rest / clear fluids   Tyl/Ibu UAD PRN     8. Poison ivy dermatitis  Comments:  kenalog 60mg IM Inj   triamcinalone cream apply bid prn   calomine lotion UAD prn   Discussed poison ivy prevention   Orders:  -     triamcinolone acetonide (KENALOG-40) injection 60 mg    Other orders  -     triamcinolone (KENALOG) 0.1 % ointment; Apply 1 application topically to the appropriate area as directed 2 (Two) Times a Day.  Dispense: 1 each; Refill: 0           Follow Up   Return if symptoms worsen or fail to improve.  Patient was given instructions and counseling regarding her condition or for health maintenance advice. Please see specific information pulled into the AVS if appropriate.

## 2023-07-31 RX ORDER — SEMAGLUTIDE 1.7 MG/.75ML
1.7 INJECTION, SOLUTION SUBCUTANEOUS WEEKLY
Qty: 2 ML | Refills: 2 | Status: SHIPPED | OUTPATIENT
Start: 2023-07-31

## 2023-08-01 ENCOUNTER — HOSPITAL ENCOUNTER (OUTPATIENT)
Dept: MRI IMAGING | Facility: HOSPITAL | Age: 38
Discharge: HOME | End: 2023-08-01
Payer: COMMERCIAL

## 2023-08-01 DIAGNOSIS — R27.0 ATAXIA: ICD-10-CM

## 2023-08-01 PROCEDURE — 70551 MRI BRAIN STEM W/O DYE: CPT

## 2023-08-22 ENCOUNTER — TELEPHONE (OUTPATIENT)
Dept: GASTROENTEROLOGY | Facility: CLINIC | Age: 38
End: 2023-08-22
Payer: COMMERCIAL

## 2023-08-22 NOTE — TELEPHONE ENCOUNTER
Contacted the patient and advised that we had an earlier opening on 8/31 for an appointment, patient did not wish to be scheduled at that time due to 9/13 working better for her schedule, I verbalized understanding and advised that I would leave her appointment scheduled as is.

## 2023-10-06 RX ORDER — PANTOPRAZOLE SODIUM 20 MG/1
TABLET, DELAYED RELEASE ORAL
Qty: 30 TABLET | Refills: 1 | Status: SHIPPED | OUTPATIENT
Start: 2023-10-06

## 2023-10-06 RX ORDER — LISINOPRIL 10 MG/1
TABLET ORAL
Qty: 30 TABLET | Refills: 3 | Status: SHIPPED | OUTPATIENT
Start: 2023-10-06

## 2023-10-09 ENCOUNTER — TELEPHONE (OUTPATIENT)
Dept: FAMILY MEDICINE CLINIC | Facility: CLINIC | Age: 38
End: 2023-10-09
Payer: COMMERCIAL
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
## 2023-10-10 RX ORDER — SEMAGLUTIDE 1 MG/.5ML
1 INJECTION, SOLUTION SUBCUTANEOUS WEEKLY
Qty: 2 ML | Refills: 3 | Status: SHIPPED | OUTPATIENT
Start: 2023-10-10 | End: 2023-10-10 | Stop reason: HOSPADM

## 2023-10-10 NOTE — TELEPHONE ENCOUNTER
Spoke to patient she would like to go back to the .5 mg. Per sukhdev this is ok. Sent to pharmacy

## 2023-10-19 ENCOUNTER — TELEPHONE (OUTPATIENT)
Dept: FAMILY MEDICINE CLINIC | Facility: CLINIC | Age: 38
End: 2023-10-19
Payer: COMMERCIAL

## 2023-11-22 ENCOUNTER — OFFICE VISIT (OUTPATIENT)
Dept: OBSTETRICS AND GYNECOLOGY | Facility: CLINIC | Age: 38
End: 2023-11-22
Payer: COMMERCIAL

## 2023-11-22 VITALS
HEART RATE: 80 BPM | DIASTOLIC BLOOD PRESSURE: 84 MMHG | SYSTOLIC BLOOD PRESSURE: 127 MMHG | HEIGHT: 59 IN | BODY MASS INDEX: 39.11 KG/M2 | WEIGHT: 194 LBS

## 2023-11-22 DIAGNOSIS — Z01.419 ENCOUNTER FOR GYNECOLOGICAL EXAMINATION WITHOUT ABNORMAL FINDING: Primary | ICD-10-CM

## 2023-11-22 PROCEDURE — 87624 HPV HI-RISK TYP POOLED RSLT: CPT

## 2023-11-22 PROCEDURE — G0123 SCREEN CERV/VAG THIN LAYER: HCPCS

## 2023-11-22 NOTE — PROGRESS NOTES
"Well Woman Visit    CC: Scheduled annual well gyn visit  Chief Complaint   Patient presents with    Gynecologic Exam     wwe       BRCAssure or VistaSeq intake in the past?: no  QUALIFIED TODAY    Contraception:  Tubal ligation  Social History     Substance and Sexual Activity   Sexual Activity Yes    Partners: Male    Birth control/protection: Tubal ligation       HPI:   38 y.o.     Menses:   q 28-30 days, lasts 4-5 days, changes products q 3 hrs on heaviest days.     Pain:  None    PCP: does manage PMHx and preventative labs  History: PMHx, Meds, Allergies, PSHx, Social Hx, and POBHx all reviewed and updated.    Pt has no complaints today.    PHYSICAL EXAM:  /84   Pulse 80   Ht 149.9 cm (59\")   Wt 88 kg (194 lb)   LMP 2023 (Approximate)   Breastfeeding No   BMI 39.18 kg/m²  Not found.  General- NAD, alert and oriented, appropriate  Psych- Normal mood, good memory  Neck- No masses, no thyroid enlargement  CV- Regular rhythm, no murnurs  Resp- CTA to bases, no wheezes  Abdomen- Soft, non distended, non tender, no masses    Breast left-  Bilaterally symmetrical, no masses, non tender, no nipple discharge  Breast right- Bilaterally symmetrical, no masses, non tender, no nipple discharge    External genitalia- Normal female, no lesions  Urethra/meatus- Normal, no masses, non tender  Bladder- Normal, no masses, non tender  Vagina- Normal, no atrophy, no lesions, no discharge.  Prolapse : none noted, not examined with split speculum to delineate  Cvx- Normal, no lesions, no discharge, No cervical motion tenderness  Uterus- Normal size, shape & consistency.  Non tender, mobile, & no prolapse  Adnexa- No mass, non tender  Anus/Rectum/Perineum- Not performed    Lymphatic- No palpable neck, axillary, or groin nodes  Ext- No edema, no cyanosis    Skin- No lesions, no rashes, no acanthosis nigricans      ASSESSMENT and PLAN:    Diagnoses and all orders for this visit:    1. Encounter for gynecological " examination without abnormal finding (Primary)  -     IgP, Aptima HPV        Preventative:  BREAST HEALTH- Monthly self breast exam importance and how to reviewed. MMG and/or MRI (prn) reviewed per society guidelines and her individual history. Screen: Not medically needed  CERVICAL CANCER Screening- Reviewed current ASCCP guidelines for screening w and wo cotest HPV, age specific.  Screen: Updated today, per patient request after guideline review  SEXUAL HEALTH: Declines STD screening  VACCINATIONS Recommended: Covid vaccine, Flu annually.  Importance discussed, risk being unvaccinated reviewed.  Questions answered  Smoking status- NON SMOKER  MYRIAD : Counseled and evaluated for hereditary cancer testing. Patients information and Fhx will be sent to genetic counselor to review and discuss with patient options for testing if she qualifies.   She is to contact our office if she has not heard from the genetic counselor in the next 2 weeks.     She understands the importance of having any ordered tests to be performed in a timely fashion.  The risks of not performing them include, but are not limited to, advanced cancer stages, bone loss from osteoporosis and/or subsequent increase in morbidity and/or mortality.  She is encouraged to review her results online and/or contact or office if she has questions.     Follow Up:  Return in about 1 year (around 11/22/2024) for Annual physical.        Gigi Reynolds, APRN  11/22/2023    [facility] [address]  [facility]  [address] [address]  Dept: [phone]  Dept Fax: [phone]  Loc: [phone]

## 2023-12-03 ENCOUNTER — APPOINTMENT (OUTPATIENT)
Dept: GENERAL RADIOLOGY | Facility: HOSPITAL | Age: 38
End: 2023-12-03
Payer: COMMERCIAL

## 2023-12-03 ENCOUNTER — HOSPITAL ENCOUNTER (EMERGENCY)
Facility: HOSPITAL | Age: 38
Discharge: HOME | End: 2023-12-03
Attending: EMERGENCY MEDICINE | Admitting: EMERGENCY MEDICINE
Payer: COMMERCIAL

## 2023-12-03 ENCOUNTER — APPOINTMENT (OUTPATIENT)
Facility: HOSPITAL | Age: 38
End: 2023-12-03
Payer: COMMERCIAL

## 2023-12-03 VITALS
HEIGHT: 59 IN | SYSTOLIC BLOOD PRESSURE: 122 MMHG | BODY MASS INDEX: 38.58 KG/M2 | WEIGHT: 191.36 LBS | HEART RATE: 63 BPM | OXYGEN SATURATION: 100 % | DIASTOLIC BLOOD PRESSURE: 89 MMHG | TEMPERATURE: 99 F | RESPIRATION RATE: 18 BRPM

## 2023-12-03 DIAGNOSIS — S86.912A KNEE STRAIN, LEFT, INITIAL ENCOUNTER: Primary | ICD-10-CM

## 2023-12-03 LAB
BH CV LOWER VASCULAR LEFT COMMON FEMORAL AUGMENT: NORMAL
BH CV LOWER VASCULAR LEFT COMMON FEMORAL COMPETENT: NORMAL
BH CV LOWER VASCULAR LEFT COMMON FEMORAL COMPRESS: NORMAL
BH CV LOWER VASCULAR LEFT COMMON FEMORAL PHASIC: NORMAL
BH CV LOWER VASCULAR LEFT COMMON FEMORAL SPONT: NORMAL
BH CV LOWER VASCULAR LEFT DISTAL FEMORAL COMPRESS: NORMAL
BH CV LOWER VASCULAR LEFT GASTRONEMIUS COMPRESS: NORMAL
BH CV LOWER VASCULAR LEFT GREATER SAPH AK COMPRESS: NORMAL
BH CV LOWER VASCULAR LEFT GREATER SAPH BK COMPRESS: NORMAL
BH CV LOWER VASCULAR LEFT LESSER SAPH COMPRESS: NORMAL
BH CV LOWER VASCULAR LEFT MID FEMORAL AUGMENT: NORMAL
BH CV LOWER VASCULAR LEFT MID FEMORAL COMPETENT: NORMAL
BH CV LOWER VASCULAR LEFT MID FEMORAL COMPRESS: NORMAL
BH CV LOWER VASCULAR LEFT MID FEMORAL PHASIC: NORMAL
BH CV LOWER VASCULAR LEFT MID FEMORAL SPONT: NORMAL
BH CV LOWER VASCULAR LEFT PERONEAL COMPRESS: NORMAL
BH CV LOWER VASCULAR LEFT POPLITEAL AUGMENT: NORMAL
BH CV LOWER VASCULAR LEFT POPLITEAL COMPETENT: NORMAL
BH CV LOWER VASCULAR LEFT POPLITEAL COMPRESS: NORMAL
BH CV LOWER VASCULAR LEFT POPLITEAL PHASIC: NORMAL
BH CV LOWER VASCULAR LEFT POPLITEAL SPONT: NORMAL
BH CV LOWER VASCULAR LEFT POSTERIOR TIBIAL COMPRESS: NORMAL
BH CV LOWER VASCULAR LEFT PROXIMAL FEMORAL COMPRESS: NORMAL
BH CV LOWER VASCULAR LEFT SAPHENOFEMORAL JUNCTION COMPRESS: NORMAL
BH CV LOWER VASCULAR RIGHT COMMON FEMORAL AUGMENT: NORMAL
BH CV LOWER VASCULAR RIGHT COMMON FEMORAL COMPETENT: NORMAL
BH CV LOWER VASCULAR RIGHT COMMON FEMORAL COMPRESS: NORMAL
BH CV LOWER VASCULAR RIGHT COMMON FEMORAL PHASIC: NORMAL
BH CV LOWER VASCULAR RIGHT COMMON FEMORAL SPONT: NORMAL
BH CV VAS PRELIMINARY FINDINGS SCRIPTING: 1

## 2023-12-03 PROCEDURE — 93971 EXTREMITY STUDY: CPT

## 2023-12-03 PROCEDURE — 99284 EMERGENCY DEPT VISIT MOD MDM: CPT

## 2023-12-03 PROCEDURE — 93971 EXTREMITY STUDY: CPT | Performed by: SURGERY

## 2023-12-03 PROCEDURE — 73562 X-RAY EXAM OF KNEE 3: CPT

## 2023-12-03 RX ORDER — IBUPROFEN 800 MG/1
800 TABLET ORAL EVERY 6 HOURS PRN
Qty: 60 TABLET | Refills: 0 | Status: SHIPPED | OUTPATIENT
Start: 2023-12-03

## 2023-12-03 NOTE — ED PROVIDER NOTES
Time: 10:46 AM EST  Date of encounter:  12/3/2023  Independent Historian/Clinical History and Information was obtained by:   Patient    History is limited by: N/A    Chief Complaint: left leg pain      History of Present Illness:  Patient is a 38 y.o. year old female who presents to the emergency department for evaluation of left leg pain x3 days. She denies injury but states she noticed the pain behind her knee first and now that her whole leg hurts she is worried about a DVT. She went to  and they sent her here for eval.    HPI    Patient Care Team  Primary Care Provider: Rachael Waters APRN    Past Medical History:     No Known Allergies  Past Medical History:   Diagnosis Date    Foot pain, left     Heel pain     Numbness in feet      Past Surgical History:   Procedure Laterality Date     SECTION      COLONOSCOPY      UPPER GASTROINTESTINAL ENDOSCOPY       Family History   Problem Relation Age of Onset    Hypertension Mother     Diabetes Mother     Hypertension Brother     Colon cancer Maternal Grandmother         45s    Melanoma Neg Hx     Uterine cancer Neg Hx     Ovarian cancer Neg Hx     Breast cancer Neg Hx     Prostate cancer Neg Hx     Deep vein thrombosis Neg Hx     Pulmonary embolism Neg Hx     Coronary artery disease Neg Hx     Stroke Neg Hx     Osteoporosis Neg Hx        Home Medications:  Prior to Admission medications    Medication Sig Start Date End Date Taking? Authorizing Provider   lisinopril (PRINIVIL,ZESTRIL) 10 MG tablet TAKE 1 TABLET BY MOUTH ONCE DAILY FOR BLOOD PRESSURE 10/6/23   Rachael Waters APRN   ondansetron (Zofran) 8 MG tablet Take 1 tablet by mouth Every 8 (Eight) Hours As Needed for Nausea. 23   Rachael Waters APRN   pantoprazole (PROTONIX) 20 MG EC tablet TAKE 1 TABLET BY MOUTH ONCE DAILY FOR STOMACH 10/6/23   Elsa Casey DO   Semaglutide-Weight Management 0.5 MG/0.5ML solution auto-injector Inject 0.5 mL under the skin into the  "appropriate area as directed 1 (One) Time Per Week.  Patient not taking: Reported on 11/22/2023 10/10/23   Rachael Waters APRN   vitamin D (ERGOCALCIFEROL) 1.25 MG (43200 UT) capsule capsule Take 1 capsule by mouth 1 (One) Time Per Week.  Patient not taking: Reported on 7/6/2023 2/1/23   Barbra Manning APRN        Social History:   Social History     Tobacco Use    Smoking status: Never     Passive exposure: Past    Smokeless tobacco: Never   Vaping Use    Vaping Use: Never used   Substance Use Topics    Alcohol use: Never    Drug use: Never         Review of Systems:  Review of Systems   Constitutional: Negative.    HENT: Negative.     Eyes: Negative.    Respiratory: Negative.     Cardiovascular: Negative.    Gastrointestinal: Negative.    Endocrine: Negative.    Genitourinary: Negative.    Musculoskeletal:  Positive for arthralgias and myalgias.   Skin: Negative.    Allergic/Immunologic: Negative.    Neurological: Negative.    Hematological: Negative.    Psychiatric/Behavioral: Negative.          Physical Exam:  /89 (Patient Position: Sitting)   Pulse 63   Temp 99 °F (37.2 °C) (Oral)   Resp 18   Ht 149.9 cm (59\")   Wt 86.8 kg (191 lb 5.8 oz)   LMP 11/13/2023 (Approximate)   SpO2 100%   BMI 38.65 kg/m²     Physical Exam  Constitutional:       Appearance: Normal appearance.   HENT:      Head: Normocephalic and atraumatic.      Nose: Nose normal.      Mouth/Throat:      Mouth: Mucous membranes are moist.   Eyes:      Pupils: Pupils are equal, round, and reactive to light.   Cardiovascular:      Rate and Rhythm: Normal rate and regular rhythm.      Pulses: Normal pulses.   Pulmonary:      Effort: Pulmonary effort is normal.      Breath sounds: Normal breath sounds.   Abdominal:      General: Abdomen is flat. Bowel sounds are normal.      Palpations: Abdomen is soft.   Musculoskeletal:         General: Normal range of motion.      Cervical back: Normal range of motion.      Right lower leg: " Normal.      Left lower leg: Normal.        Legs:       Comments: LLE painful, +distal pulses and sensation, negative alli's sign. Pain is worse behind knee. No obvious swelling or discoloration. She is ambulatory   Skin:     General: Skin is warm and dry.      Capillary Refill: Capillary refill takes less than 2 seconds.   Neurological:      General: No focal deficit present.      Mental Status: She is alert and oriented to person, place, and time. Mental status is at baseline.   Psychiatric:         Mood and Affect: Mood normal.                  Procedures:  Procedures      Medical Decision Making:      Comorbidities that affect care:    None    External Notes reviewed:    None      The following orders were placed and all results were independently analyzed by me:  Orders Placed This Encounter   Procedures    XR Knee 3 View Left    Obtain & Apply The Following- Lower extremity; Knee sleeve       Medications Given in the Emergency Department:  Medications - No data to display     ED Course:    ED Course as of 12/03/23 1421   Sun Dec 03, 2023   1240 Vascular tech advises preliminary result is negative for DVT [TP]      ED Course User Index  [TP] Zhanna Guthrie APRN       Labs:    Lab Results (last 24 hours)       ** No results found for the last 24 hours. **             Imaging:    XR Knee 3 View Left    Result Date: 12/3/2023  PROCEDURE: XR KNEE 3 VW LEFT  COMPARISON: [facility], CR, XR KNEE 3 VW LEFT, 8/03/2022, 11:48.  INDICATIONS: posterior left knee pain. no injury  FINDINGS:  There is mild osteophyte formation of the patella.  Joint spaces appear grossly preserved.  No acute fracture or malalignment is seen.  Mineralization appears grossly normal.  No definite knee effusion.  Soft tissues appear unremarkable.        No radiographic findings of acute osseous knee abnormality.      ELBA WASHBURN MD       Electronically Signed and Approved By: ELBA WASHBURN MD on 12/03/2023 at 13:11                 Differential Diagnosis and Discussion:    Extremity Pain: Differential diagnosis includes but is not limited to soft tissue sprain, tendonitis, tendon injury, dislocation, fracture, deep vein thrombosis, arterial insufficiency, osteoarthritis, bursitis, and ligamentous damage.    Ultrasound impression was interpreted by me.     MDM     Amount and/or Complexity of Data Reviewed  Tests in the radiology section of CPT®: reviewed                 Patient Care Considerations:           Consultants/Shared Management Plan:    None    Social Determinants of Health:    Patient is independent, reliable, and has access to care.       Disposition and Care Coordination:    Discharged: The patient is suitable and stable for discharge with no need for consideration of observation or admission.    I have explained the patient´s condition, diagnoses and treatment plan based on the information available to me at this time. I have answered questions and addressed any concerns. The patient has a good  understanding of the patient´s diagnosis, condition, and treatment plan as can be expected at this point. The vital signs have been stable. The patient´s condition is stable and appropriate for discharge from the emergency department.      The patient will pursue further outpatient evaluation with the primary care physician or other designated or consulting physician as outlined in the discharge instructions. They are agreeable to this plan of care and follow-up instructions have been explained in detail. The patient has received these instructions in written format and have expressed an understanding of the discharge instructions. The patient is aware that any significant change in condition or worsening of symptoms should prompt an immediate return to this or the closest emergency department or call to 911.  I have explained discharge medications and the need for follow up with the patient/caretakers. This was also printed in the  discharge instructions. Patient was discharged with the following medications and follow up:      Medication List        New Prescriptions      ibuprofen 800 MG tablet  Commonly known as: ADVIL,MOTRIN  Take 1 tablet by mouth Every 6 (Six) Hours As Needed for Moderate Pain.            Stop      Semaglutide-Weight Management 0.5 MG/0.5ML solution auto-injector     vitamin D 1.25 MG (78258 UT) capsule capsule  Commonly known as: ERGOCALCIFEROL               Where to Get Your Medications        These medications were sent to [facility] - [place], [place] - [address] [facility] - [phone]  - [phone]   [address] [facility] [address]      Phone: [phone]   ibuprofen 800 MG tablet      Rachael Waters, APRN  [address]   [facility] [address]  [place] [id number] [phone]    In 3 days  As needed       Final diagnoses:   Knee strain, left, initial encounter        ED Disposition       ED Disposition   Discharge    Condition   Stable    Comment   --               This medical record created using voice recognition software.             Zhanna Guthrie, APRN  12/03/23 1428

## 2023-12-03 NOTE — ED NOTES
+  pedal pulse in left foot and negative for Cierra's sign, patient states pain is mainly in behind left knee - worse with walking and bending the knee

## 2023-12-03 NOTE — Clinical Note
[facility] EMERGENCY ROOM  [address] [place] [address]  Phone: [phone]    Neha May was seen and treated in our emergency department on 12/3/2023.  She may return to work on 12/04/2023.         Thank you for choosing [facility].    Zhanna Guthrie, APRN

## 2023-12-03 NOTE — Clinical Note
[facility] EMERGENCY ROOM  [address] [place] [address]  Phone: [phone]    Neha May was seen and treated in our emergency department on 12/3/2023.  She may return to work on 12/04/2023.         Thank you for choosing [facility].    Zhanna Guthrie, APRN

## 2023-12-03 NOTE — DISCHARGE INSTRUCTIONS
Elevate, alternate ice/heat for pain  Wear the knee sleeve while walking for support  Take the ibuprofen as needed for pain  Follow up with your PCP in 1 week if no better with conservative treatment  Return to the ER for worsening or new symptoms of concern

## 2023-12-26 RX ORDER — SEMAGLUTIDE 0.5 MG/.5ML
INJECTION, SOLUTION SUBCUTANEOUS
Qty: 4 ML | Refills: 0 | OUTPATIENT
Start: 2023-12-26

## 2023-12-29 RX ORDER — PANTOPRAZOLE SODIUM 20 MG/1
TABLET, DELAYED RELEASE ORAL
Qty: 30 TABLET | Refills: 0 | Status: SHIPPED | OUTPATIENT
Start: 2023-12-29

## 2023-12-29 RX ORDER — PANTOPRAZOLE SODIUM 20 MG/1
20 TABLET, DELAYED RELEASE ORAL DAILY
Qty: 30 TABLET | Refills: 1 | Status: SHIPPED | OUTPATIENT
Start: 2023-12-29

## 2023-12-29 NOTE — TELEPHONE ENCOUNTER
Caller: Neha May    Relationship: Self    Best call back number: [phone]     Requested Prescriptions:   Requested Prescriptions     Pending Prescriptions Disp Refills    pantoprazole (PROTONIX) 20 MG EC tablet 30 tablet 1        Pharmacy where request should be sent: [address] - [phone]  - [phone] [facility]     Last office visit with prescribing clinician: 6/2/2023   Last telemedicine visit with prescribing clinician: Visit date not found   Next office visit with prescribing clinician: 1/11/2024     Additional details provided by patient: LESS THAN THREE DAY SUPPLY.     Does the patient have less than a 3 day supply:  [x] Yes  [] No    Would you like a call back once the refill request has been completed: [x] Yes [] No    If the office needs to give you a call back, can they leave a voicemail: [x] Yes [] No    Bere Goode Rep   12/29/23 13:01 EST

## 2024-01-31 RX ORDER — PANTOPRAZOLE SODIUM 20 MG/1
TABLET, DELAYED RELEASE ORAL
Qty: 30 TABLET | Refills: 0 | Status: SHIPPED | OUTPATIENT
Start: 2024-01-31

## 2024-02-08 ENCOUNTER — OFFICE VISIT (OUTPATIENT)
Dept: FAMILY MEDICINE CLINIC | Facility: CLINIC | Age: 39
End: 2024-02-08
Payer: COMMERCIAL

## 2024-02-08 VITALS
OXYGEN SATURATION: 100 % | RESPIRATION RATE: 16 BRPM | BODY MASS INDEX: 39.39 KG/M2 | TEMPERATURE: 98 F | DIASTOLIC BLOOD PRESSURE: 80 MMHG | SYSTOLIC BLOOD PRESSURE: 117 MMHG | WEIGHT: 195.4 LBS | HEART RATE: 72 BPM | HEIGHT: 59 IN

## 2024-02-08 DIAGNOSIS — Z13.29 SCREENING FOR THYROID DISORDER: ICD-10-CM

## 2024-02-08 DIAGNOSIS — Z23 NEED FOR INFLUENZA VACCINATION: Primary | ICD-10-CM

## 2024-02-08 DIAGNOSIS — Z11.59 NEED FOR HEPATITIS C SCREENING TEST: ICD-10-CM

## 2024-02-08 DIAGNOSIS — Z51.81 ENCOUNTER FOR MEDICATION MONITORING: ICD-10-CM

## 2024-02-08 DIAGNOSIS — Z00.00 ANNUAL PHYSICAL EXAM: ICD-10-CM

## 2024-02-08 DIAGNOSIS — K21.9 GASTROESOPHAGEAL REFLUX DISEASE WITHOUT ESOPHAGITIS: Chronic | ICD-10-CM

## 2024-02-08 DIAGNOSIS — Z13.220 SCREENING FOR LIPID DISORDERS: ICD-10-CM

## 2024-02-08 DIAGNOSIS — I10 BENIGN ESSENTIAL HTN: ICD-10-CM

## 2024-02-08 DIAGNOSIS — E66.9 OBESITY (BMI 30-39.9): ICD-10-CM

## 2024-02-08 DIAGNOSIS — E55.9 VITAMIN D DEFICIENCY: Chronic | ICD-10-CM

## 2024-02-08 LAB
AMPHET+METHAMPHET UR QL: NEGATIVE
AMPHETAMINE INTERNAL CONTROL: NORMAL
AMPHETAMINES UR QL: NEGATIVE
BARBITURATE INTERNAL CONTROL: NORMAL
BARBITURATES UR QL SCN: NEGATIVE
BENZODIAZ UR QL SCN: NEGATIVE
BENZODIAZEPINE INTERNAL CONTROL: NORMAL
BUPRENORPHINE INTERNAL CONTROL: NORMAL
BUPRENORPHINE SERPL-MCNC: NEGATIVE NG/ML
CANNABINOIDS SERPL QL: NEGATIVE
COCAINE INTERNAL CONTROL: NORMAL
COCAINE UR QL: NEGATIVE
EXPIRATION DATE: NORMAL
Lab: NORMAL
MDMA (ECSTASY) INTERNAL CONTROL: NORMAL
MDMA UR QL SCN: NEGATIVE
METHADONE INTERNAL CONTROL: NORMAL
METHADONE UR QL SCN: NEGATIVE
METHAMPHETAMINE INTERNAL CONTROL: NORMAL
MORPHINE INTERNAL CONTROL: NORMAL
MORPHINE/OPIATES SCREEN, URINE: NEGATIVE
OXYCODONE INTERNAL CONTROL: NORMAL
OXYCODONE UR QL SCN: NEGATIVE
PCP UR QL SCN: NEGATIVE
PHENCYCLIDINE INTERNAL CONTROL: NORMAL
THC INTERNAL CONTROL: NORMAL

## 2024-02-08 NOTE — ASSESSMENT & PLAN NOTE
Patient's (Body mass index is 39.47 kg/m².) indicates that they are obese (BMI >30) with health conditions that include hypertension . Weight is improving with treatment. BMI  is above average; BMI management plan is completed. We discussed zepbound vs phentermine. Start zepbound 2.5mg weekly inj. Continue diet and exercise mods. Will start phentermine if unable to get zepbound. Erick reviewed and appopriate.   UDS reviewed and appropriate.   Narcotic consent on file.

## 2024-02-08 NOTE — PROGRESS NOTES
"Chief Complaint  discuss weightloss (Pt used to be on wegovy but it was hard to get at the pharmacy.pt used to take phentermine in the past as well.) and Annual Exam    Subjective        Neha May presents to [facility]  History of Present Illness  Pt presents for annual pe/labs. Pt states she has been unable to lose weight despite diet and exercise mods. Has tried phentermine and wegovy in the past with positive results. Has been unable to get wegovy.     Reviewed all recent labs and medications.      Objective   Vital Signs:  /80   Pulse 72   Temp 98 °F (36.7 °C) (Temporal)   Resp 16   Ht 149.9 cm (59\")   Wt 88.6 kg (195 lb 6.4 oz)   SpO2 100%   BMI 39.47 kg/m²   Estimated body mass index is 39.47 kg/m² as calculated from the following:    Height as of this encounter: 149.9 cm (59\").    Weight as of this encounter: 88.6 kg (195 lb 6.4 oz).               Physical Exam  Vitals reviewed.   Constitutional:       General: She is not in acute distress.  HENT:      Head: Normocephalic.      Right Ear: Tympanic membrane normal.      Left Ear: Tympanic membrane normal.      Nose: Nose normal.      Mouth/Throat:      Pharynx: Oropharynx is clear. No posterior oropharyngeal erythema.   Eyes:      General: No scleral icterus.     Extraocular Movements: Extraocular movements intact.      Conjunctiva/sclera: Conjunctivae normal.      Pupils: Pupils are equal, round, and reactive to light.   Cardiovascular:      Rate and Rhythm: Normal rate and regular rhythm.      Pulses: Normal pulses.      Heart sounds: Normal heart sounds.   Pulmonary:      Effort: Pulmonary effort is normal.      Breath sounds: Normal breath sounds.   Abdominal:      General: Bowel sounds are normal.      Palpations: Abdomen is soft.   Musculoskeletal:         General: Normal range of motion.      Cervical back: Neck supple.   Skin:     General: Skin is warm and dry.   Neurological:      Mental Status: " She is alert and oriented to person, place, and time.   Psychiatric:         Mood and Affect: Mood normal.         Behavior: Behavior normal.         Thought Content: Thought content normal.         Judgment: Judgment normal.        Result Review :        Data reviewed : Consultant notes ob/gyn, gastro             Assessment and Plan     Diagnoses and all orders for this visit:    1. Need for influenza vaccination (Primary)  -     Fluzone >6 Months (8539-7335)    2. Annual physical exam  Assessment & Plan:  Discussed age appropriate preventative counseling including all recommended screenings and immunizations, sunscreen, and seatbelt use. Written information provided to patient. All questions answered. Pt verbalized understanding.       Orders:  -     Comprehensive metabolic panel; Future  -     CBC & Differential; Future  -     TSH; Future  -     Lipid panel; Future    3. Benign essential HTN  Assessment & Plan:  Hypertension is improving with treatment.  Continue current treatment regimen.  Dietary sodium restriction.  Blood pressure will be reassessed at the next regular appointment.      4. Obesity (BMI 30-39.9)  Assessment & Plan:  Patient's (Body mass index is 39.47 kg/m².) indicates that they are obese (BMI >30) with health conditions that include hypertension . Weight is improving with treatment. BMI  is above average; BMI management plan is completed. We discussed zepbound vs phentermine. Start zepbound 2.5mg weekly inj. Continue diet and exercise mods. Will start phentermine if unable to get zepbound. Erick reviewed and appopriate.   UDS reviewed and appropriate.   Narcotic consent on file.          5. Gastroesophageal reflux disease without esophagitis  Assessment & Plan:  Controlled  Continue protonix 20mg PO qd   Lifestyle mods to treat as well       6. Need for hepatitis C screening test  -     Hepatitis C antibody; Future    7. Screening for lipid disorders  -     Lipid panel; Future    8. Screening  for thyroid disorder  -     TSH; Future    9. Vitamin D deficiency  -     Vitamin D 25 hydroxy; Future    10. Encounter for medication monitoring  -     POC 12 Panel Urine Drug Screen    Other orders  -     Tirzepatide-Weight Management (ZEPBOUND) 2.5 MG/0.5ML solution auto-injector; Inject 0.5 mL under the skin into the appropriate area as directed 1 (One) Time Per Week.  Dispense: 2 mL; Refill: 2             Follow Up     Return if symptoms worsen or fail to improve.  Patient was given instructions and counseling regarding her condition or for health maintenance advice. Please see specific information pulled into the AVS if appropriate.

## 2024-02-09 ENCOUNTER — TELEPHONE (OUTPATIENT)
Dept: FAMILY MEDICINE CLINIC | Facility: CLINIC | Age: 39
End: 2024-02-09
Payer: COMMERCIAL

## 2024-02-09 ENCOUNTER — PATIENT ROUNDING (BHMG ONLY) (OUTPATIENT)
Dept: FAMILY MEDICINE CLINIC | Facility: CLINIC | Age: 39
End: 2024-02-09
Payer: COMMERCIAL

## 2024-02-09 NOTE — PROGRESS NOTES
A My-Chart message has been sent to the patient for PATIENT ROUNDING with [facility] – [place], [place].

## 2024-02-12 RX ORDER — SEMAGLUTIDE 0.25 MG/.5ML
0.25 INJECTION, SOLUTION SUBCUTANEOUS WEEKLY
Qty: 2 ML | Refills: 0 | Status: SHIPPED | OUTPATIENT
Start: 2024-02-12

## 2024-02-14 ENCOUNTER — TELEPHONE (OUTPATIENT)
Dept: FAMILY MEDICINE CLINIC | Facility: CLINIC | Age: 39
End: 2024-02-14
Payer: COMMERCIAL

## 2024-02-22 ENCOUNTER — LAB (OUTPATIENT)
Dept: LAB | Facility: HOSPITAL | Age: 39
End: 2024-02-22
Payer: COMMERCIAL

## 2024-02-22 ENCOUNTER — OFFICE VISIT (OUTPATIENT)
Dept: FAMILY MEDICINE CLINIC | Facility: CLINIC | Age: 39
End: 2024-02-22
Payer: COMMERCIAL

## 2024-02-22 VITALS
HEART RATE: 81 BPM | DIASTOLIC BLOOD PRESSURE: 81 MMHG | OXYGEN SATURATION: 98 % | RESPIRATION RATE: 16 BRPM | BODY MASS INDEX: 39.64 KG/M2 | HEIGHT: 59 IN | SYSTOLIC BLOOD PRESSURE: 117 MMHG | TEMPERATURE: 98 F | WEIGHT: 196.6 LBS

## 2024-02-22 DIAGNOSIS — E66.9 OBESITY (BMI 30-39.9): ICD-10-CM

## 2024-02-22 DIAGNOSIS — M54.50 LOW BACK PAIN, UNSPECIFIED BACK PAIN LATERALITY, UNSPECIFIED CHRONICITY, UNSPECIFIED WHETHER SCIATICA PRESENT: Primary | ICD-10-CM

## 2024-02-22 DIAGNOSIS — E55.9 VITAMIN D DEFICIENCY: Chronic | ICD-10-CM

## 2024-02-22 DIAGNOSIS — Z00.00 ANNUAL PHYSICAL EXAM: ICD-10-CM

## 2024-02-22 DIAGNOSIS — Z11.59 NEED FOR HEPATITIS C SCREENING TEST: ICD-10-CM

## 2024-02-22 DIAGNOSIS — R35.0 URINE FREQUENCY: ICD-10-CM

## 2024-02-22 DIAGNOSIS — M54.50 LOW BACK PAIN, UNSPECIFIED BACK PAIN LATERALITY, UNSPECIFIED CHRONICITY, UNSPECIFIED WHETHER SCIATICA PRESENT: ICD-10-CM

## 2024-02-22 DIAGNOSIS — Z13.29 SCREENING FOR THYROID DISORDER: ICD-10-CM

## 2024-02-22 DIAGNOSIS — Z51.81 ENCOUNTER FOR MEDICATION MONITORING: ICD-10-CM

## 2024-02-22 DIAGNOSIS — I10 BENIGN ESSENTIAL HTN: ICD-10-CM

## 2024-02-22 DIAGNOSIS — Z13.220 SCREENING FOR LIPID DISORDERS: ICD-10-CM

## 2024-02-22 LAB
25(OH)D3 SERPL-MCNC: 26.6 NG/ML (ref 30–100)
ALBUMIN SERPL-MCNC: 4.2 G/DL (ref 3.5–5.2)
ALBUMIN/GLOB SERPL: 1.6 G/DL
ALP SERPL-CCNC: 68 U/L (ref 39–117)
ALT SERPL W P-5'-P-CCNC: 14 U/L (ref 1–33)
AMPHET+METHAMPHET UR QL: NEGATIVE
AMPHETAMINE INTERNAL CONTROL: NORMAL
AMPHETAMINES UR QL: NEGATIVE
ANION GAP SERPL CALCULATED.3IONS-SCNC: 13.1 MMOL/L (ref 5–15)
AST SERPL-CCNC: 18 U/L (ref 1–32)
BARBITURATE INTERNAL CONTROL: NORMAL
BARBITURATES UR QL SCN: NEGATIVE
BASOPHILS # BLD AUTO: 0.06 10*3/MM3 (ref 0–0.2)
BASOPHILS NFR BLD AUTO: 0.8 % (ref 0–1.5)
BENZODIAZ UR QL SCN: NEGATIVE
BENZODIAZEPINE INTERNAL CONTROL: NORMAL
BILIRUB BLD-MCNC: NEGATIVE MG/DL
BILIRUB SERPL-MCNC: 0.4 MG/DL (ref 0–1.2)
BUN SERPL-MCNC: 13 MG/DL (ref 6–20)
BUN/CREAT SERPL: 17.1 (ref 7–25)
BUPRENORPHINE INTERNAL CONTROL: NORMAL
BUPRENORPHINE SERPL-MCNC: NEGATIVE NG/ML
CALCIUM SPEC-SCNC: 9.2 MG/DL (ref 8.6–10.5)
CANNABINOIDS SERPL QL: NEGATIVE
CHLORIDE SERPL-SCNC: 103 MMOL/L (ref 98–107)
CHOLEST SERPL-MCNC: 181 MG/DL (ref 0–200)
CLARITY, POC: CLEAR
CO2 SERPL-SCNC: 19.9 MMOL/L (ref 22–29)
COCAINE INTERNAL CONTROL: NORMAL
COCAINE UR QL: NEGATIVE
COLOR UR: YELLOW
CREAT SERPL-MCNC: 0.76 MG/DL (ref 0.57–1)
DEPRECATED RDW RBC AUTO: 37.9 FL (ref 37–54)
EGFRCR SERPLBLD CKD-EPI 2021: 102.4 ML/MIN/1.73
EOSINOPHIL # BLD AUTO: 0.25 10*3/MM3 (ref 0–0.4)
EOSINOPHIL NFR BLD AUTO: 3.5 % (ref 0.3–6.2)
ERYTHROCYTE [DISTWIDTH] IN BLOOD BY AUTOMATED COUNT: 11.8 % (ref 12.3–15.4)
EXPIRATION DATE: ABNORMAL
EXPIRATION DATE: NORMAL
GLOBULIN UR ELPH-MCNC: 2.6 GM/DL
GLUCOSE SERPL-MCNC: 97 MG/DL (ref 65–99)
GLUCOSE UR STRIP-MCNC: NEGATIVE MG/DL
HCT VFR BLD AUTO: 42.8 % (ref 34–46.6)
HCV AB SER DONR QL: NORMAL
HDLC SERPL-MCNC: 56 MG/DL (ref 40–60)
HGB BLD-MCNC: 14.2 G/DL (ref 12–15.9)
IMM GRANULOCYTES # BLD AUTO: 0.02 10*3/MM3 (ref 0–0.05)
IMM GRANULOCYTES NFR BLD AUTO: 0.3 % (ref 0–0.5)
KETONES UR QL: NEGATIVE
LDLC SERPL CALC-MCNC: 113 MG/DL (ref 0–100)
LDLC/HDLC SERPL: 2 {RATIO}
LEUKOCYTE EST, POC: NEGATIVE
LYMPHOCYTES # BLD AUTO: 2.12 10*3/MM3 (ref 0.7–3.1)
LYMPHOCYTES NFR BLD AUTO: 29.7 % (ref 19.6–45.3)
Lab: ABNORMAL
Lab: NORMAL
MCH RBC QN AUTO: 29.6 PG (ref 26.6–33)
MCHC RBC AUTO-ENTMCNC: 33.2 G/DL (ref 31.5–35.7)
MCV RBC AUTO: 89.2 FL (ref 79–97)
MDMA (ECSTASY) INTERNAL CONTROL: NORMAL
MDMA UR QL SCN: NEGATIVE
METHADONE INTERNAL CONTROL: NORMAL
METHADONE UR QL SCN: NEGATIVE
METHAMPHETAMINE INTERNAL CONTROL: NORMAL
MONOCYTES # BLD AUTO: 0.52 10*3/MM3 (ref 0.1–0.9)
MONOCYTES NFR BLD AUTO: 7.3 % (ref 5–12)
MORPHINE INTERNAL CONTROL: NORMAL
MORPHINE/OPIATES SCREEN, URINE: NEGATIVE
NEUTROPHILS NFR BLD AUTO: 4.17 10*3/MM3 (ref 1.7–7)
NEUTROPHILS NFR BLD AUTO: 58.4 % (ref 42.7–76)
NITRITE UR-MCNC: NEGATIVE MG/ML
NRBC BLD AUTO-RTO: 0 /100 WBC (ref 0–0.2)
OXYCODONE INTERNAL CONTROL: NORMAL
OXYCODONE UR QL SCN: NEGATIVE
PCP UR QL SCN: NEGATIVE
PH UR: 6.5 [PH] (ref 5–8)
PHENCYCLIDINE INTERNAL CONTROL: NORMAL
PLATELET # BLD AUTO: 282 10*3/MM3 (ref 140–450)
PMV BLD AUTO: 10.7 FL (ref 6–12)
POTASSIUM SERPL-SCNC: 4.7 MMOL/L (ref 3.5–5.2)
PROT SERPL-MCNC: 6.8 G/DL (ref 6–8.5)
PROT UR STRIP-MCNC: NEGATIVE MG/DL
RBC # BLD AUTO: 4.8 10*6/MM3 (ref 3.77–5.28)
RBC # UR STRIP: ABNORMAL /UL
SODIUM SERPL-SCNC: 136 MMOL/L (ref 136–145)
SP GR UR: 1.02 (ref 1–1.03)
THC INTERNAL CONTROL: NORMAL
TRIGL SERPL-MCNC: 65 MG/DL (ref 0–150)
TSH SERPL DL<=0.05 MIU/L-ACNC: 1.19 UIU/ML (ref 0.27–4.2)
UROBILINOGEN UR QL: ABNORMAL
VLDLC SERPL-MCNC: 12 MG/DL (ref 5–40)
WBC NRBC COR # BLD AUTO: 7.14 10*3/MM3 (ref 3.4–10.8)

## 2024-02-22 PROCEDURE — 87086 URINE CULTURE/COLONY COUNT: CPT | Performed by: NURSE PRACTITIONER

## 2024-02-22 PROCEDURE — 86803 HEPATITIS C AB TEST: CPT

## 2024-02-22 PROCEDURE — 80050 GENERAL HEALTH PANEL: CPT

## 2024-02-22 PROCEDURE — 36415 COLL VENOUS BLD VENIPUNCTURE: CPT

## 2024-02-22 PROCEDURE — 82306 VITAMIN D 25 HYDROXY: CPT

## 2024-02-22 PROCEDURE — 80061 LIPID PANEL: CPT

## 2024-02-22 RX ORDER — PHENTERMINE HYDROCHLORIDE 37.5 MG/1
37.5 TABLET ORAL
Qty: 30 TABLET | Refills: 0 | Status: SHIPPED | OUTPATIENT
Start: 2024-02-22

## 2024-02-22 NOTE — ASSESSMENT & PLAN NOTE
Patient's (Body mass index is 39.71 kg/m².) indicates that they are obese (BMI >30) with health conditions that include hypertension . Weight is improving with treatment. BMI  is above average; BMI management plan is completed. We discussed diet, exercise, wegovy and phentermine. Restart phentermine. Unable to get wegovy.  Erick reviewed and appopriate.   UDS reviewed and appropriate.   Narcotic consent on file.   .

## 2024-02-22 NOTE — PROGRESS NOTES
"Chief Complaint  Back Pain (Symptoms started Sunday evening ) and frequent urination    Subjective        Neha May presents to [facility]  History of Present Illness  Pt presents c/o low back pain, frequent urination, dysuria x 5 days. Denies fever, chills, SOB, chest pain, n/v/d or other. Has not taken anything to treat.     Pt c/o inability to lose weight despite diet and exercise mods. States she is unable to get wegovy d/t insurance. She has not met deductible. Would like to try phentermine. Has taken in the past with positive results. States she did have a little difficulty sleeping when taking, otherwise no med SE/AE.     Reviewed all recent labs and medications.      Objective   Vital Signs:  /81   Pulse 81   Temp 98 °F (36.7 °C) (Temporal)   Resp 16   Ht 149.9 cm (59\")   Wt 89.2 kg (196 lb 9.6 oz)   SpO2 98%   BMI 39.71 kg/m²   Estimated body mass index is 39.71 kg/m² as calculated from the following:    Height as of this encounter: 149.9 cm (59\").    Weight as of this encounter: 89.2 kg (196 lb 9.6 oz).               Physical Exam  Vitals reviewed.   Constitutional:       General: She is not in acute distress.  HENT:      Head: Normocephalic.      Right Ear: Tympanic membrane normal.      Left Ear: Tympanic membrane normal.      Nose: Nose normal.      Mouth/Throat:      Pharynx: Oropharynx is clear. No posterior oropharyngeal erythema.   Eyes:      General: No scleral icterus.     Extraocular Movements: Extraocular movements intact.      Conjunctiva/sclera: Conjunctivae normal.      Pupils: Pupils are equal, round, and reactive to light.   Cardiovascular:      Rate and Rhythm: Normal rate and regular rhythm.      Pulses: Normal pulses.      Heart sounds: Normal heart sounds.   Pulmonary:      Effort: Pulmonary effort is normal.      Breath sounds: Normal breath sounds.   Abdominal:      General: Bowel sounds are normal.      Palpations: Abdomen is " soft.   Musculoskeletal:         General: Normal range of motion.      Cervical back: Neck supple.      Lumbar back: Tenderness and bony tenderness present.   Skin:     General: Skin is warm and dry.   Neurological:      Mental Status: She is alert and oriented to person, place, and time.   Psychiatric:         Mood and Affect: Mood normal.         Behavior: Behavior normal.         Thought Content: Thought content normal.         Judgment: Judgment normal.        Result Review :        Data reviewed : Consultant notes ob/gyn              Assessment and Plan     Diagnoses and all orders for this visit:    1. Low back pain, unspecified back pain laterality, unspecified chronicity, unspecified whether sciatica present (Primary)  Comments:  tyl/ibu UAD prn  Orders:  -     POCT urinalysis dipstick, automated    2. Benign essential HTN  Assessment & Plan:  Hypertension is improving with treatment.  Continue current treatment regimen.  Dietary sodium restriction.  Blood pressure will be reassessed at the next regular appointment.      3. Urine frequency  Comments:  urine culture sent  Orders:  -     POCT urinalysis dipstick, automated    4. Obesity (BMI 30-39.9)  Assessment & Plan:  Patient's (Body mass index is 39.71 kg/m².) indicates that they are obese (BMI >30) with health conditions that include hypertension . Weight is improving with treatment. BMI  is above average; BMI management plan is completed. We discussed diet, exercise, wegovy and phentermine. Restart phentermine. Unable to get wegovy.  Erick reviewed and appopriate.   UDS reviewed and appropriate.   Narcotic consent on file.   .     Orders:  -     phentermine (ADIPEX-P) 37.5 MG tablet; Take 1 tablet by mouth Every Morning Before Breakfast.  Dispense: 30 tablet; Refill: 0    5. Encounter for medication monitoring  -     POC Medline 12 Panel Urine Drug Screen             Follow Up     Return in 4 weeks (on 3/21/2024), or if symptoms worsen or fail to  improve.  Patient was given instructions and counseling regarding her condition or for health maintenance advice. Please see specific information pulled into the AVS if appropriate.

## 2024-02-22 NOTE — PATIENT INSTRUCTIONS
Obesity, Adult  Obesity is having too much body fat. Being obese means that your weight is more than what is healthy for you.   BMI (body mass index) is a number that explains how much body fat you have. If you have a BMI of 30 or more, you are obese.  Obesity can cause serious health problems, such as:  Stroke.  Coronary artery disease (CAD).  Type 2 diabetes.  Some types of cancer.  High blood pressure (hypertension).  High cholesterol.  Gallbladder stones.  Obesity can also contribute to:  Osteoarthritis.  Sleep apnea.  Infertility problems.  What are the causes?  Eating meals each day that are high in calories, sugar, and fat.  Drinking a lot of drinks that have sugar in them.  Being born with genes that may make you more likely to become obese.  Having a medical condition that causes obesity.  Taking certain medicines.  Sitting a lot (having a sedentary lifestyle).  Not getting enough sleep.  What increases the risk?  Having a family history of obesity.  Living in an area with limited access to:  [place], recreation centers, or sidewalks.  Healthy food choices, such as grocery stores and farmers' markets.  What are the signs or symptoms?  The main sign is having too much body fat.  How is this treated?  Treatment for this condition often includes changing your lifestyle. Treatment may include:  Changing your diet. This may include making a healthy meal plan.  Exercise. This may include activity that causes your heart to beat faster (aerobic exercise) and strength training. Work with your doctor to design a program that works for you.  Medicine to help you lose weight. This may be used if you are not able to lose one pound a week after 6 weeks of healthy eating and more exercise.  Treating conditions that cause the obesity.  Surgery. Options may include gastric banding and gastric bypass. This may be done if:  Other treatments have not helped to improve your condition.  You have a BMI of 40 or higher.  You have  life-threatening health problems related to obesity.  Follow these instructions at home:  Eating and drinking    Follow advice from your doctor about what to eat and drink. Your doctor may tell you to:  Limit fast food, sweets, and processed snack foods.  Choose low-fat options. For example, choose low-fat milk instead of whole milk.  Eat five or more servings of fruits or vegetables each day.  Eat at home more often. This gives you more control over what you eat.  Choose healthy foods when you eat out.  Learn to read food labels. This will help you learn how much food is in one serving.  Keep low-fat snacks available.  Avoid drinks that have a lot of sugar in them. These include soda, fruit juice, iced tea with sugar, and flavored milk.  Drink enough water to keep your pee (urine) pale yellow.  Do not go on fad diets.  Physical activity  Exercise often, as told by your doctor. Most adults should get up to 150 minutes of moderate-intensity exercise every week.Ask your doctor:  What types of exercise are safe for you.  How often you should exercise.  Warm up and stretch before being active.  Do slow stretching after being active (cool down).  Rest between times of being active.  Lifestyle  Work with your doctor and a food expert (dietitian) to set a weight-loss goal that is best for you.  Limit your screen time.  Find ways to reward yourself that do not involve food.  Do not drink alcohol if:  Your doctor tells you not to drink.  You are pregnant, may be pregnant, or are planning to become pregnant.  If you drink alcohol:  Limit how much you have to:  0-1 drink a day for women.  0-2 drinks a day for men.  Know how much alcohol is in your drink. In the [place] one drink equals one 12 oz bottle of beer (355 mL), one 5 oz glass of wine (148 mL), or one 1½ oz glass of hard liquor (44 mL).  General instructions  Keep a weight-loss journal. This can help you keep track of:  The food that you eat.  How much exercise you  get.  Take over-the-counter and prescription medicines only as told by your doctor.  Take vitamins and supplements only as told by your doctor.  Think about joining a support group.  Pay attention to your mental health as obesity can lead to depression or self esteem issues.  Keep all follow-up visits.  Contact a doctor if:  You cannot meet your weight-loss goal after you have changed your diet and lifestyle for 6 weeks.  You are having trouble breathing.  Summary  Obesity is having too much body fat.  Being obese means that your weight is more than what is healthy for you.  Work with your doctor to set a weight-loss goal.  Get regular exercise as told by your doctor.  This information is not intended to replace advice given to you by your health care provider. Make sure you discuss any questions you have with your health care provider.  Document Revised: 07/26/2022 Document Reviewed: 07/26/2022  Elsevier Patient Education © 2023 [employer]

## 2024-02-23 DIAGNOSIS — E55.9 VITAMIN D DEFICIENCY: Primary | Chronic | ICD-10-CM

## 2024-02-23 LAB — BACTERIA SPEC AEROBE CULT: NORMAL

## 2024-02-23 RX ORDER — NITROFURANTOIN 25; 75 MG/1; MG/1
100 CAPSULE ORAL 2 TIMES DAILY
Qty: 14 CAPSULE | Refills: 0 | Status: SHIPPED | OUTPATIENT
Start: 2024-02-23 | End: 2024-03-01

## 2024-02-26 RX ORDER — ERGOCALCIFEROL 1.25 MG/1
50000 CAPSULE ORAL WEEKLY
Qty: 12 CAPSULE | Refills: 1 | Status: SHIPPED | OUTPATIENT
Start: 2024-02-26

## 2024-02-29 ENCOUNTER — OFFICE VISIT (OUTPATIENT)
Dept: GASTROENTEROLOGY | Facility: CLINIC | Age: 39
End: 2024-02-29
Payer: COMMERCIAL

## 2024-02-29 VITALS
HEIGHT: 59 IN | SYSTOLIC BLOOD PRESSURE: 110 MMHG | WEIGHT: 193.6 LBS | BODY MASS INDEX: 39.03 KG/M2 | HEART RATE: 70 BPM | DIASTOLIC BLOOD PRESSURE: 66 MMHG

## 2024-02-29 DIAGNOSIS — Z86.010 HISTORY OF ADENOMATOUS POLYP OF COLON: ICD-10-CM

## 2024-02-29 DIAGNOSIS — Z80.0 FH: COLON CANCER: ICD-10-CM

## 2024-02-29 DIAGNOSIS — K44.9 HIATAL HERNIA: ICD-10-CM

## 2024-02-29 DIAGNOSIS — K21.00 GASTROESOPHAGEAL REFLUX DISEASE WITH ESOPHAGITIS WITHOUT HEMORRHAGE: Primary | ICD-10-CM

## 2024-02-29 PROBLEM — Z86.0101 HISTORY OF ADENOMATOUS POLYP OF COLON: Status: ACTIVE | Noted: 2024-02-29

## 2024-02-29 PROCEDURE — 99214 OFFICE O/P EST MOD 30 MIN: CPT | Performed by: NURSE PRACTITIONER

## 2024-02-29 RX ORDER — SODIUM, POTASSIUM,MAG SULFATES 17.5-3.13G
2 SOLUTION, RECONSTITUTED, ORAL ORAL TAKE AS DIRECTED
Qty: 177 ML | Refills: 0 | Status: SHIPPED | OUTPATIENT
Start: 2024-02-29

## 2024-02-29 RX ORDER — TIRZEPATIDE 2.5 MG/.5ML
INJECTION, SOLUTION SUBCUTANEOUS
COMMUNITY
Start: 2024-02-23

## 2024-02-29 NOTE — PROGRESS NOTES
Chief Complaint   Difficulty Swallowing    History of Present Illness       Neha May is a 38 y.o. female who presents to [facility] for follow-up For trouble swallowing.  She is new to me today.  She was last seen on a telehealth visit by nurse practitioner Pedro Dawn on 9/13/2022.        She has a history of GERD and has been taking Protonix 20 mg in the morning.  She underwent EGD and colonoscopy with Dr. Mcclure on 5/13/2021.  EGD showed duodenitis, hiatal hernia, gastritis and a stricture in the GE junction which was biopsied and dilated.  Grade a reflux esophagitis noted.  1 cm transverse colon polyp was removed and a 12 mm rectal polyp was removed.  Nonbleeding internal hemorrhoids noted.  Recall colonoscopy in 3 years for surveillance.  Path positive for tubulovillous adenoma and hyperplastic polyp.  Mild chronic inactive gastritis.  Reflux esophagitis.    She admits her bowels move well everyday. Denies any rectal bleeding or melena. Good appetite. She is on ZEPBOUND now for weight loss. Admits reflux is well controlled with protonix. Denies any breakthrough or dysphagia.     GI FH----Maternal grandmother with colon cancer. Mother with colon polyps.       Results       Result Review :       CMP          2/22/2024    09:22   CMP   Glucose 97    BUN 13    Creatinine 0.76    EGFR 102.4    Sodium 136    Potassium 4.7    Chloride 103    Calcium 9.2    Total Protein 6.8    Albumin 4.2    Globulin 2.6    Total Bilirubin 0.4    Alkaline Phosphatase 68    AST (SGOT) 18    ALT (SGPT) 14    Albumin/Globulin Ratio 1.6    BUN/Creatinine Ratio 17.1    Anion Gap 13.1      CBC          2/22/2024    09:22   CBC   WBC 7.14    RBC 4.80    Hemoglobin 14.2    Hematocrit 42.8    MCV 89.2    MCH 29.6    MCHC 33.2    RDW 11.8    Platelets 282      Lipid Panel          2/22/2024    09:22   Lipid Panel   Total Cholesterol 181    Triglycerides 65    HDL Cholesterol 56    VLDL Cholesterol  "12    LDL Cholesterol  113    LDL/HDL Ratio 2.00      TSH          2024    09:22   TSH   TSH 1.190        Lipase   Lipase   Date Value Ref Range Status   2022 31 13 - 60 U/L Final     Amylase No results found for: \"AMYLASE\"            Past Medical History       Past Medical History:   Diagnosis Date    Colon polyp     Have had colonoscopy bc of family histoey - polyps seen - no sognoficant fondings    Foot pain, left     GERD (gastroesophageal reflux disease)     Heel pain     Hypertension     Take a low dose of kedication daily    Numbness in feet        Past Surgical History:   Procedure Laterality Date     SECTION      COLONOSCOPY      TUBAL ABDOMINAL LIGATION      UPPER GASTROINTESTINAL ENDOSCOPY           Current Outpatient Medications:     ibuprofen (ADVIL,MOTRIN) 800 MG tablet, Take 1 tablet by mouth Every 6 (Six) Hours As Needed for Moderate Pain., Disp: 60 tablet, Rfl: 0    lisinopril (PRINIVIL,ZESTRIL) 10 MG tablet, TAKE 1 TABLET BY MOUTH ONCE DAILY FOR BLOOD PRESSURE, Disp: 30 tablet, Rfl: 3    nitrofurantoin, macrocrystal-monohydrate, (Macrobid) 100 MG capsule, Take 1 capsule by mouth 2 (Two) Times a Day for 7 days., Disp: 14 capsule, Rfl: 0    pantoprazole (PROTONIX) 20 MG EC tablet, Take 1 tablet by mouth Daily., Disp: 30 tablet, Rfl: 1    phentermine (ADIPEX-P) 37.5 MG tablet, Take 1 tablet by mouth Every Morning Before Breakfast., Disp: 30 tablet, Rfl: 0    vitamin D (ERGOCALCIFEROL) 1.25 MG (18822 UT) capsule capsule, Take 1 capsule by mouth 1 (One) Time Per Week., Disp: 12 capsule, Rfl: 1    Zepbound 2.5 MG/0.5ML solution auto-injector, INJECT 0.5 ML SUBCUTANEOUSLY ONCE A WEEK, Disp: , Rfl:     sodium-potassium-magnesium sulfates (Suprep Bowel Prep Kit) 17.5-3.13-1.6 GM/177ML solution oral solution, Take 2 bottles by mouth Take As Directed., Disp: 177 mL, Rfl: 0     No Known Allergies    Family History   Problem Relation Age of Onset    Hypertension Mother     " "Diabetes Mother     Hypertension Brother     Colon cancer Maternal Grandmother         45s    Melanoma Neg Hx     Uterine cancer Neg Hx     Ovarian cancer Neg Hx     Breast cancer Neg Hx     Prostate cancer Neg Hx     Deep vein thrombosis Neg Hx     Pulmonary embolism Neg Hx     Coronary artery disease Neg Hx     Stroke Neg Hx     Osteoporosis Neg Hx         Social History     Social History Narrative    Not on file       Objective       Review of Systems   Constitutional:  Negative for appetite change, fatigue, fever, unexpected weight gain and unexpected weight loss.   HENT:  Negative for trouble swallowing.    Respiratory:  Negative for cough, choking, chest tightness, shortness of breath, wheezing and stridor.    Cardiovascular:  Negative for chest pain, palpitations and leg swelling.   Gastrointestinal:  Negative for abdominal distention, abdominal pain, anal bleeding, blood in stool, constipation, diarrhea, nausea, rectal pain, vomiting, GERD and indigestion.        Vital Signs:   /66 (BP Location: Left arm, Patient Position: Sitting, Cuff Size: Adult)   Pulse 70   Ht 149.9 cm (59\")   Wt 87.8 kg (193 lb 9.6 oz)   BMI 39.10 kg/m²       Physical Exam  Constitutional:       General: She is not in acute distress.     Appearance: She is well-developed. She is not ill-appearing.   HENT:      Head: Normocephalic.   Eyes:      Pupils: Pupils are equal, round, and reactive to light.   Cardiovascular:      Rate and Rhythm: Normal rate and regular rhythm.      Heart sounds: Normal heart sounds.   Pulmonary:      Effort: Pulmonary effort is normal.      Breath sounds: Normal breath sounds.   Abdominal:      General: Bowel sounds are normal. There is no distension.      Palpations: Abdomen is soft. There is no mass.      Tenderness: There is no abdominal tenderness. There is no guarding or rebound.      Hernia: No hernia is present.   Musculoskeletal:         General: Normal range of motion.   Skin:     General: " Skin is warm and dry.   Neurological:      Mental Status: She is alert and oriented to person, place, and time.   Psychiatric:         Speech: Speech normal.         Behavior: Behavior normal.         Judgment: Judgment normal.           Assessment & Plan          Assessment and Plan    Diagnoses and all orders for this visit:    1. Gastroesophageal reflux disease with esophagitis without hemorrhage (Primary)  -     Case Request; Standing  -     Follow Anesthesia Guidelines / Protocol; Future  -     Obtain Informed Consent; Future  -     Case Request  -     sodium-potassium-magnesium sulfates (Suprep Bowel Prep Kit) 17.5-3.13-1.6 GM/177ML solution oral solution; Take 2 bottles by mouth Take As Directed.  Dispense: 177 mL; Refill: 0    2. Hiatal hernia  -     Case Request; Standing  -     Follow Anesthesia Guidelines / Protocol; Future  -     Obtain Informed Consent; Future  -     Case Request  -     sodium-potassium-magnesium sulfates (Suprep Bowel Prep Kit) 17.5-3.13-1.6 GM/177ML solution oral solution; Take 2 bottles by mouth Take As Directed.  Dispense: 177 mL; Refill: 0    3. History of adenomatous polyp of colon  -     Case Request; Standing  -     Follow Anesthesia Guidelines / Protocol; Future  -     Obtain Informed Consent; Future  -     Case Request  -     sodium-potassium-magnesium sulfates (Suprep Bowel Prep Kit) 17.5-3.13-1.6 GM/177ML solution oral solution; Take 2 bottles by mouth Take As Directed.  Dispense: 177 mL; Refill: 0    4. FH: colon cancer  -     Case Request; Standing  -     Follow Anesthesia Guidelines / Protocol; Future  -     Obtain Informed Consent; Future  -     Case Request  -     sodium-potassium-magnesium sulfates (Suprep Bowel Prep Kit) 17.5-3.13-1.6 GM/177ML solution oral solution; Take 2 bottles by mouth Take As Directed.  Dispense: 177 mL; Refill: 0    Other orders  -     Verify NPO; Standing  -     Verify Bowel Prep Was Successful; Standing  -     Give Tap Water Enema If Bowel  Prep Insufficient; Standing  -     Obtain Informed Consent; Standing    Reviewed medical history with her today.  She is due for a screening colonoscopy given her history of tubulovillous adenomas and family history of colon cancer.  Given her reflux history and trouble swallowing in the past would also recommend that we check an EGD at the same time.  Patient is agreeable to doing both scopes.  Patient doing well on Protonix 20 mg daily.  Continue GERD precautions.  Bowels moving well currently.  Patient to call the office with any issues.  Patient to follow-up with me after her scopes.  Patient is agreeable to the plan.    Surgical Risk and Benefits discussed: Possible risks/complications, benefits, and alternatives to surgical or invasive procedure have been explained to patient and/or legal guardian; risks include bleeding, infection, and perforation. Patient has been evaluated and can tolerate anesthesia and/or sedation. Risks, benefits, and alternatives to anesthesia and sedation have been explained to patient and/or legal guardian.            Follow Up       Follow Up   Return for F/U AFTER PROCEDURE.  Patient was given instructions and counseling regarding her condition or for health maintenance advice. Please see specific information pulled into the AVS if appropriate.

## 2024-03-07 RX ORDER — PANTOPRAZOLE SODIUM 20 MG/1
TABLET, DELAYED RELEASE ORAL
Qty: 30 TABLET | Refills: 0 | Status: SHIPPED | OUTPATIENT
Start: 2024-03-07

## 2024-03-13 RX ORDER — TIRZEPATIDE 2.5 MG/.5ML
2.5 INJECTION, SOLUTION SUBCUTANEOUS WEEKLY
Qty: 2 ML | Refills: 0 | Status: SHIPPED | OUTPATIENT
Start: 2024-03-13

## 2024-03-13 RX ORDER — LISINOPRIL 10 MG/1
TABLET ORAL
Qty: 30 TABLET | Refills: 2 | Status: SHIPPED | OUTPATIENT
Start: 2024-03-13

## 2024-03-13 NOTE — TELEPHONE ENCOUNTER
Caller: Neha May    Relationship: Self    Best call back number: [phone]     Requested Prescriptions:   Requested Prescriptions     Pending Prescriptions Disp Refills    Zepbound 2.5 MG/0.5ML solution auto-injector          Pharmacy where request should be sent: [address] [phone]  - [phone] [facility]     Last office visit with prescribing clinician: 2/22/2024   Last telemedicine visit with prescribing clinician: Visit date not found   Next office visit with prescribing clinician: 3/22/2024     Additional details provided by patient: PATIENT WOULD LIKE TO STAY ON THE SAME DOSAGE. SHE TAKES HER LAST INJECTION NEXT WEEK.    Does the patient have less than a 3 day supply:  [] Yes  [x] No      Bere Rosario Rep   03/13/24 10:39 EDT

## 2024-03-22 ENCOUNTER — OFFICE VISIT (OUTPATIENT)
Dept: FAMILY MEDICINE CLINIC | Facility: CLINIC | Age: 39
End: 2024-03-22
Payer: COMMERCIAL

## 2024-03-22 VITALS
TEMPERATURE: 98.3 F | HEART RATE: 61 BPM | OXYGEN SATURATION: 98 % | WEIGHT: 191.4 LBS | HEIGHT: 59 IN | SYSTOLIC BLOOD PRESSURE: 109 MMHG | BODY MASS INDEX: 38.59 KG/M2 | DIASTOLIC BLOOD PRESSURE: 74 MMHG

## 2024-03-22 DIAGNOSIS — K21.9 GASTROESOPHAGEAL REFLUX DISEASE WITHOUT ESOPHAGITIS: Chronic | ICD-10-CM

## 2024-03-22 DIAGNOSIS — E66.9 OBESITY (BMI 30-39.9): Primary | ICD-10-CM

## 2024-03-22 DIAGNOSIS — I10 BENIGN ESSENTIAL HTN: ICD-10-CM

## 2024-03-22 PROBLEM — U07.1 COVID-19 VIRUS INFECTION: Status: RESOLVED | Noted: 2022-01-14 | Resolved: 2024-03-22

## 2024-03-22 PROBLEM — K21.00 GASTROESOPHAGEAL REFLUX DISEASE WITH ESOPHAGITIS WITHOUT HEMORRHAGE: Status: RESOLVED | Noted: 2024-02-29 | Resolved: 2024-03-22

## 2024-03-22 PROCEDURE — 99214 OFFICE O/P EST MOD 30 MIN: CPT | Performed by: NURSE PRACTITIONER

## 2024-03-22 NOTE — PROGRESS NOTES
"Chief Complaint  Follow-up (1 month follow up on medications, pt was prescribed zepbound 0.5mg, last weight was 193.0 lbs)    Subjective        Neha May presents to [facility]  History of Present Illness  Pt presents FU zepbound for tx of obesity. Pt tolerating medication well. Denies any GI SE, tolerating well. She has lost 2 lbs since last visit. She continues with diet and exercise modifications.     Reviewed all recent labs and medications.      Objective   Vital Signs:  /74   Pulse 61   Temp 98.3 °F (36.8 °C) (Temporal)   Ht 149.9 cm (59\")   Wt 86.8 kg (191 lb 6.4 oz)   SpO2 98%   BMI 38.66 kg/m²   Estimated body mass index is 38.66 kg/m² as calculated from the following:    Height as of this encounter: 149.9 cm (59\").    Weight as of this encounter: 86.8 kg (191 lb 6.4 oz).               Physical Exam  Vitals reviewed.   Constitutional:       General: She is not in acute distress.  HENT:      Head: Normocephalic.      Right Ear: Tympanic membrane normal.      Left Ear: Tympanic membrane normal.      Nose: Nose normal.      Mouth/Throat:      Pharynx: Oropharynx is clear. No posterior oropharyngeal erythema.   Eyes:      General: No scleral icterus.     Extraocular Movements: Extraocular movements intact.      Conjunctiva/sclera: Conjunctivae normal.      Pupils: Pupils are equal, round, and reactive to light.   Cardiovascular:      Rate and Rhythm: Normal rate and regular rhythm.      Pulses: Normal pulses.      Heart sounds: Normal heart sounds.   Pulmonary:      Effort: Pulmonary effort is normal.      Breath sounds: Normal breath sounds.   Abdominal:      General: Bowel sounds are normal.      Palpations: Abdomen is soft.   Musculoskeletal:         General: Normal range of motion.      Cervical back: Neck supple.   Skin:     General: Skin is warm and dry.   Neurological:      Mental Status: She is alert and oriented to person, place, and time. "   Psychiatric:         Mood and Affect: Mood normal.         Behavior: Behavior normal.         Thought Content: Thought content normal.         Judgment: Judgment normal.        Result Review :      Common labs          2/22/2024    09:22   Common Labs   Glucose 97    BUN 13    Creatinine 0.76    Sodium 136    Potassium 4.7    Chloride 103    Calcium 9.2    Albumin 4.2    Total Bilirubin 0.4    Alkaline Phosphatase 68    AST (SGOT) 18    ALT (SGPT) 14    WBC 7.14    Hemoglobin 14.2    Hematocrit 42.8    Platelets 282    Total Cholesterol 181    Triglycerides 65    HDL Cholesterol 56    LDL Cholesterol  113      Data reviewed : Consultant notes gastro, ob/gyn             Assessment and Plan     Diagnoses and all orders for this visit:    1. Obesity (BMI 30-39.9) (Primary)  Assessment & Plan:  Patient's (Body mass index is 38.66 kg/m².) indicates that they are obese (BMI >30) with health conditions that include hypertension . Weight is improving with treatment. BMI  is above average; BMI management plan is completed. We discussed diet, exercise, and zepbound. Continue medication as prescribed.       2. Benign essential HTN  Assessment & Plan:  Hypertension is improving with treatment.  Continue current treatment regimen.  Dietary sodium restriction.  Blood pressure will be reassessed at the next regular appointment.      3. Gastroesophageal reflux disease without esophagitis  Assessment & Plan:  Controlled  Continue protonix 20mg PO qd   Lifestyle mods to treat as well       Other orders  -     Tirzepatide-Weight Management (ZEPBOUND) 5 MG/0.5ML solution auto-injector; Inject 0.5 mL under the skin into the appropriate area as directed 1 (One) Time Per Week.  Dispense: 2 mL; Refill: 2             Follow Up     Return if symptoms worsen or fail to improve.  Patient was given instructions and counseling regarding her condition or for health maintenance advice. Please see specific information pulled into the AVS if  appropriate.

## 2024-03-22 NOTE — ASSESSMENT & PLAN NOTE
Patient's (Body mass index is 38.66 kg/m².) indicates that they are obese (BMI >30) with health conditions that include hypertension . Weight is improving with treatment. BMI  is above average; BMI management plan is completed. We discussed diet, exercise, and zepbound. Continue medication as prescribed.

## 2024-04-05 RX ORDER — NITROFURANTOIN 25; 75 MG/1; MG/1
100 CAPSULE ORAL 2 TIMES DAILY
Qty: 14 CAPSULE | Refills: 0 | OUTPATIENT
Start: 2024-04-05

## 2024-04-09 ENCOUNTER — TELEPHONE (OUTPATIENT)
Dept: GASTROENTEROLOGY | Facility: CLINIC | Age: 39
End: 2024-04-09
Payer: COMMERCIAL

## 2024-04-25 PROBLEM — K21.00 GASTROESOPHAGEAL REFLUX DISEASE WITH ESOPHAGITIS WITHOUT HEMORRHAGE: Status: ACTIVE | Noted: 2024-02-29

## 2024-04-30 NOTE — PRE-PROCEDURE INSTRUCTIONS
"Instructed on date and arrival time of 0600. Come to entrance \"C\". Must have  over age 18 to drive home.  May have two visitors; however, children under 12 must stay in waiting room.  Discussed clear liquid diet (no red or purple), bowel prep, and NPO.  May take medications as usual except for blood thinners, diabetic medications, and weight loss medications.  Verbalized understanding of instructions given.  Instructed to call for questions or concerns.  Hold Zepbound for one week prior to procedure.  "

## 2024-05-02 ENCOUNTER — ANESTHESIA EVENT (OUTPATIENT)
Dept: GASTROENTEROLOGY | Facility: HOSPITAL | Age: 39
End: 2024-05-02
Payer: COMMERCIAL

## 2024-05-02 NOTE — ANESTHESIA PREPROCEDURE EVALUATION
Anesthesia Evaluation     Patient summary reviewed and Nursing notes reviewed   NPO Solid Status: > 8 hours  NPO Liquid Status: > 4 hours           Airway   Mallampati: II  TM distance: >3 FB  Neck ROM: full  No difficulty expected  Dental - normal exam     Pulmonary     breath sounds clear to auscultation  Cardiovascular   Exercise tolerance: good (4-7 METS)    Rhythm: regular  Rate: normal    (+) hypertension well controlled less than 2 medications      Neuro/Psych  (+) numbness  GI/Hepatic/Renal/Endo    (+) obesity, hiatal hernia, GERD well controlled    Musculoskeletal     Abdominal    Substance History      OB/GYN          Other        ROS/Med Hx Other: Pt has not taken GLP1 for two weeks 4/19     S/P tubal ligation                Anesthesia Plan    ASA 2     general   total IV anesthesia  (Total IV Anesthesia    Patient understands anesthesia not responsible for dental damage.  )  intravenous induction     Anesthetic plan, risks, benefits, and alternatives have been provided, discussed and informed consent has been obtained with: patient and spouse/significant other.  Pre-procedure education provided  Plan discussed with CRNA.    CODE STATUS:

## 2024-05-03 ENCOUNTER — ANESTHESIA (OUTPATIENT)
Dept: GASTROENTEROLOGY | Facility: HOSPITAL | Age: 39
End: 2024-05-03
Payer: COMMERCIAL

## 2024-05-03 ENCOUNTER — HOSPITAL ENCOUNTER (OUTPATIENT)
Facility: HOSPITAL | Age: 39
Setting detail: HOSPITAL OUTPATIENT SURGERY
Discharge: HOME | End: 2024-05-03
Attending: INTERNAL MEDICINE | Admitting: INTERNAL MEDICINE
Payer: COMMERCIAL

## 2024-05-03 VITALS
DIASTOLIC BLOOD PRESSURE: 65 MMHG | SYSTOLIC BLOOD PRESSURE: 117 MMHG | RESPIRATION RATE: 17 BRPM | TEMPERATURE: 97 F | BODY MASS INDEX: 36.69 KG/M2 | HEART RATE: 62 BPM | OXYGEN SATURATION: 98 % | WEIGHT: 181.66 LBS

## 2024-05-03 DIAGNOSIS — K44.9 HIATAL HERNIA: ICD-10-CM

## 2024-05-03 DIAGNOSIS — K21.00 GASTROESOPHAGEAL REFLUX DISEASE WITH ESOPHAGITIS WITHOUT HEMORRHAGE: ICD-10-CM

## 2024-05-03 DIAGNOSIS — Z86.010 HISTORY OF ADENOMATOUS POLYP OF COLON: ICD-10-CM

## 2024-05-03 DIAGNOSIS — Z80.0 FH: COLON CANCER: ICD-10-CM

## 2024-05-03 PROCEDURE — 43239 EGD BIOPSY SINGLE/MULTIPLE: CPT | Performed by: INTERNAL MEDICINE

## 2024-05-03 PROCEDURE — 45385 COLONOSCOPY W/LESION REMOVAL: CPT | Performed by: INTERNAL MEDICINE

## 2024-05-03 PROCEDURE — 88305 TISSUE EXAM BY PATHOLOGIST: CPT | Performed by: INTERNAL MEDICINE

## 2024-05-03 PROCEDURE — 25010000002 PROPOFOL 10 MG/ML EMULSION: Performed by: NURSE ANESTHETIST, CERTIFIED REGISTERED

## 2024-05-03 PROCEDURE — 43249 ESOPH EGD DILATION <30 MM: CPT | Performed by: INTERNAL MEDICINE

## 2024-05-03 PROCEDURE — 25810000003 LACTATED RINGERS PER 1000 ML: Performed by: NURSE ANESTHETIST, CERTIFIED REGISTERED

## 2024-05-03 PROCEDURE — C1726 CATH, BAL DIL, NON-VASCULAR: HCPCS | Performed by: INTERNAL MEDICINE

## 2024-05-03 RX ORDER — SODIUM CHLORIDE, SODIUM LACTATE, POTASSIUM CHLORIDE, CALCIUM CHLORIDE 600; 310; 30; 20 MG/100ML; MG/100ML; MG/100ML; MG/100ML
INJECTION, SOLUTION INTRAVENOUS CONTINUOUS PRN
Status: DISCONTINUED | OUTPATIENT
Start: 2024-05-03 | End: 2024-05-03 | Stop reason: SURG

## 2024-05-03 RX ORDER — PROPOFOL 10 MG/ML
VIAL (ML) INTRAVENOUS AS NEEDED
Status: DISCONTINUED | OUTPATIENT
Start: 2024-05-03 | End: 2024-05-03 | Stop reason: SURG

## 2024-05-03 RX ORDER — LIDOCAINE HYDROCHLORIDE 20 MG/ML
INJECTION, SOLUTION EPIDURAL; INFILTRATION; INTRACAUDAL; PERINEURAL AS NEEDED
Status: DISCONTINUED | OUTPATIENT
Start: 2024-05-03 | End: 2024-05-03 | Stop reason: SURG

## 2024-05-03 RX ADMIN — LIDOCAINE HYDROCHLORIDE 100 MG: 20 INJECTION, SOLUTION EPIDURAL; INFILTRATION; INTRACAUDAL; PERINEURAL at 07:27

## 2024-05-03 RX ADMIN — PROPOFOL 150 MG: 10 INJECTION, EMULSION INTRAVENOUS at 07:27

## 2024-05-03 RX ADMIN — PROPOFOL 250 MCG/KG/MIN: 10 INJECTION, EMULSION INTRAVENOUS at 07:27

## 2024-05-03 RX ADMIN — PROPOFOL 30 MG: 10 INJECTION, EMULSION INTRAVENOUS at 07:39

## 2024-05-03 RX ADMIN — SODIUM CHLORIDE, POTASSIUM CHLORIDE, SODIUM LACTATE AND CALCIUM CHLORIDE: 600; 310; 30; 20 INJECTION, SOLUTION INTRAVENOUS at 07:23

## 2024-05-03 NOTE — H&P
Pre Procedure History & Physical    Chief Complaint:   GERD, dysphagia, surveillance colonoscopy    Subjective     HPI:   39 yo F here for eval of GERD, dysphagia, surveillance colonoscopy.    Past Medical History:   Past Medical History:   Diagnosis Date    Colon polyp     Have had colonoscopy bc of family histoey - polyps seen - no sognoficant fondings    Foot pain, left     GERD (gastroesophageal reflux disease)     Heel pain     Hypertension     Take a low dose of kedication daily    Numbness in feet        Past Surgical History:  Past Surgical History:   Procedure Laterality Date     SECTION      COLONOSCOPY      TUBAL ABDOMINAL LIGATION      UPPER GASTROINTESTINAL ENDOSCOPY         Family History:  Family History   Problem Relation Age of Onset    Hypertension Mother     Diabetes Mother     Hypertension Brother     Colon cancer Maternal Grandmother         45s    Melanoma Neg Hx     Uterine cancer Neg Hx     Ovarian cancer Neg Hx     Breast cancer Neg Hx     Prostate cancer Neg Hx     Deep vein thrombosis Neg Hx     Pulmonary embolism Neg Hx     Coronary artery disease Neg Hx     Stroke Neg Hx     Osteoporosis Neg Hx        Social History:   reports that she has never smoked. She has been exposed to tobacco smoke. She has never used smokeless tobacco. She reports that she does not drink alcohol and does not use drugs.    Medications:   Medications Prior to Admission   Medication Sig Dispense Refill Last Dose    ibuprofen (ADVIL,MOTRIN) 800 MG tablet Take 1 tablet by mouth Every 6 (Six) Hours As Needed for Moderate Pain. 60 tablet 0 Past Week    lisinopril (PRINIVIL,ZESTRIL) 10 MG tablet TAKE 1 TABLET BY MOUTH ONCE DAILY FOR BLOOD PRESSURE 30 tablet 2 2024    pantoprazole (PROTONIX) 20 MG EC tablet TAKE 1 TABLET BY MOUTH ONCE DAILY (STOMACH) 30 tablet 0 2024    vitamin D (ERGOCALCIFEROL) 1.25 MG (14016 UT) capsule capsule Take 1 capsule by mouth 1 (One) Time Per Week. 12 capsule 1  Past Month    Tirzepatide-Weight Management (ZEPBOUND) 5 MG/0.5ML solution auto-injector Inject 0.5 mL under the skin into the appropriate area as directed 1 (One) Time Per Week. 2 mL 2 4/18/2024       Allergies:  Patient has no known allergies.    ROS:    Pertinent items are noted in HPI     Objective     Blood pressure 117/76, pulse 72, temperature 97.4 °F (36.3 °C), temperature source Temporal, resp. rate 16, weight 82.4 kg (181 lb 10.5 oz), SpO2 97%, not currently breastfeeding.    Physical Exam   Constitutional: Pt is oriented to person, place, and time and well-developed, well-nourished, and in no distress.   Mouth/Throat: Oropharynx is clear and moist.   Neck: Normal range of motion.   Cardiovascular: Normal rate, regular rhythm and normal heart sounds.    Pulmonary/Chest: Effort normal and breath sounds normal.   Abdominal: Soft. Nontender  Skin: Skin is warm and dry.   Psychiatric: Mood, memory, affect and judgment normal.     Assessment & Plan     Diagnosis:  GERD, dysphagia, surveillance colonoscopy    Anticipated Surgical Procedure:  EGD/colonoscopy    The risks, benefits, and alternatives of this procedure have been discussed with the patient or the responsible party- the patient understands and agrees to proceed.

## 2024-05-03 NOTE — ANESTHESIA POSTPROCEDURE EVALUATION
Patient: Neha May    Procedure Summary       Date: 05/03/24 Room / Location: [facility] ENDOSCOPY 2 / [facility] END[facility]COPY    Anesthesia Start: 0723 Anesthesia Stop: 0749    Procedures:       ESOPHAGOGASTRODUODENOSCOPY WITH BIOPSIES AND 15-18MM BALLOON DILATATION      COLONOSCOPY WITH COLD SNARE POLYPECTOMY Diagnosis:       Gastroesophageal reflux disease with esophagitis without hemorrhage      Hiatal hernia      History of adenomatous polyp of colon      FH: colon cancer      (Gastroesophageal reflux disease with esophagitis without hemorrhage [K21.00])      (Hiatal hernia [K44.9])      (History of adenomatous polyp of colon [Z86.010])      (FH: colon cancer [Z80.0])    Surgeons: Amie Mcclure MD Provider: Nader Mendoza CRNA    Anesthesia Type: general ASA Status: 2            Anesthesia Type: general    Vitals  Vitals Value Taken Time   /65 05/03/24 0808   Temp 36.1 °C (97 °F) 05/03/24 0808   Pulse 62 05/03/24 0808   Resp 17 05/03/24 0808   SpO2 98 % 05/03/24 0808           Post Anesthesia Care and Evaluation    Post-procedure mental status: acceptable.  Pain management: satisfactory to patient    Airway patency: patent  Anesthetic complications: No anesthetic complications    Cardiovascular status: acceptable  Respiratory status: acceptable    Comments: Per chart review

## 2024-05-06 LAB
CYTO UR: NORMAL
LAB AP CASE REPORT: NORMAL
LAB AP CLINICAL INFORMATION: NORMAL
LAB AP DIAGNOSIS COMMENT: NORMAL
PATH REPORT.FINAL DX SPEC: NORMAL
PATH REPORT.GROSS SPEC: NORMAL

## 2024-05-08 ENCOUNTER — TELEPHONE (OUTPATIENT)
Dept: GASTROENTEROLOGY | Facility: CLINIC | Age: 39
End: 2024-05-08
Payer: COMMERCIAL

## 2024-06-10 RX ORDER — PANTOPRAZOLE SODIUM 20 MG/1
TABLET, DELAYED RELEASE ORAL
Qty: 30 TABLET | Refills: 0 | Status: SHIPPED | OUTPATIENT
Start: 2024-06-10 | End: 2024-06-11 | Stop reason: SDUPTHER

## 2024-06-11 ENCOUNTER — OFFICE VISIT (OUTPATIENT)
Dept: GASTROENTEROLOGY | Facility: CLINIC | Age: 39
End: 2024-06-11
Payer: COMMERCIAL

## 2024-06-11 VITALS
HEIGHT: 59 IN | SYSTOLIC BLOOD PRESSURE: 128 MMHG | HEART RATE: 65 BPM | WEIGHT: 188 LBS | DIASTOLIC BLOOD PRESSURE: 70 MMHG | BODY MASS INDEX: 37.9 KG/M2

## 2024-06-11 DIAGNOSIS — K21.00 GASTROESOPHAGEAL REFLUX DISEASE WITH ESOPHAGITIS WITHOUT HEMORRHAGE: Primary | ICD-10-CM

## 2024-06-11 DIAGNOSIS — K44.9 HIATAL HERNIA: ICD-10-CM

## 2024-06-11 DIAGNOSIS — Z86.010 HISTORY OF COLON POLYPS: ICD-10-CM

## 2024-06-11 PROCEDURE — 99214 OFFICE O/P EST MOD 30 MIN: CPT | Performed by: NURSE PRACTITIONER

## 2024-06-11 RX ORDER — PANTOPRAZOLE SODIUM 20 MG/1
20 TABLET, DELAYED RELEASE ORAL DAILY
Qty: 90 TABLET | Refills: 3 | Status: SHIPPED | OUTPATIENT
Start: 2024-06-11

## 2024-06-11 NOTE — PROGRESS NOTES
Chief Complaint   Heartburn    History of Present Illness       Neha May is a 38 y.o. female who presents to [facility] for follow-up for GERD.  She was last seen in the office by me on 2/29/2024.    She has a history of GERD and has been taking Protonix 20 mg in the morning.  She underwent EGD and colonoscopy with Dr. Mcclure on 5/13/2021.  EGD showed duodenitis, hiatal hernia, gastritis and a stricture in the GE junction which was biopsied and dilated.  Grade a reflux esophagitis noted.  1 cm transverse colon polyp was removed and a 12 mm rectal polyp was removed.  Nonbleeding internal hemorrhoids noted.  Recall colonoscopy in 3 years for surveillance.  Path positive for tubulovillous adenoma and hyperplastic polyp.  Mild chronic inactive gastritis.  Reflux esophagitis.     She admits her bowels move well everyday. Denies any rectal bleeding or melena. Good appetite. She is on ZEPBOUND now for weight loss. Admits reflux is well controlled with protonix. Denies any breakthrough or dysphagia.      GI FH----Maternal grandmother with colon cancer. Mother with colon polyps.      She underwent EGD and colonoscopy with Dr. Mcclure on 5/3/2024.  EGD showed a nonobstructing Schatzki ring which was dilated and biopsied.  Small hiatal hernia.  Colonoscopy showed mild diverticulosis.  6 mm transverse colon polyp which was removed.  Otherwise normal exam.  Path positive for sessile serrated lesion.  Gastritis.  Reflux esophagitis.  Recall colonoscopy in 5 years.    She admits her bowels move well most of the time. She will use LINZESS when she is on the weight loss shot but it works well. She is doing well with protonix 20 mg daily.   Results       Result Review :       CMP          2/22/2024    09:22   CMP   Glucose 97    BUN 13    Creatinine 0.76    EGFR 102.4    Sodium 136    Potassium 4.7    Chloride 103    Calcium 9.2    Total Protein 6.8    Albumin 4.2    Globulin 2.6   "  Total Bilirubin 0.4    Alkaline Phosphatase 68    AST (SGOT) 18    ALT (SGPT) 14    Albumin/Globulin Ratio 1.6    BUN/Creatinine Ratio 17.1    Anion Gap 13.1      CBC          2024    09:22   CBC   WBC 7.14    RBC 4.80    Hemoglobin 14.2    Hematocrit 42.8    MCV 89.2    MCH 29.6    MCHC 33.2    RDW 11.8    Platelets 282      CBC w/diff          2024    09:22   CBC w/Diff   WBC 7.14    RBC 4.80    Hemoglobin 14.2    Hematocrit 42.8    MCV 89.2    MCH 29.6    MCHC 33.2    RDW 11.8    Platelets 282    Neutrophil Rel % 58.4    Immature Granulocyte Rel % 0.3    Lymphocyte Rel % 29.7    Monocyte Rel % 7.3    Eosinophil Rel % 3.5    Basophil Rel % 0.8      Lipid Panel          2024    09:22   Lipid Panel   Total Cholesterol 181    Triglycerides 65    HDL Cholesterol 56    VLDL Cholesterol 12    LDL Cholesterol  113    LDL/HDL Ratio 2.00      TSH          2024    09:22   TSH   TSH 1.190        Lipase   Lipase   Date Value Ref Range Status   2022 31 13 - 60 U/L Final     Amylase No results found for: \"AMYLASE\"  Iron Profile No results found for: \"IRON\", \"TIBC\", \"LABIRON\", \"TRANSFERRIN\"  Ferritin No results found for: \"FERRITIN\"            Past Medical History       Past Medical History:   Diagnosis Date    Colon polyp     Have had colonoscopy bc of family histoey - polyps seen - no sognoficant fondings    Foot pain, left     GERD (gastroesophageal reflux disease)     Heel pain     Hypertension     Take a low dose of kedication daily    Numbness in feet        Past Surgical History:   Procedure Laterality Date     SECTION      COLONOSCOPY      COLONOSCOPY N/A 5/3/2024    Procedure: COLONOSCOPY WITH COLD SNARE POLYPECTOMY;  Surgeon: Amie Mccluer MD;  [facility];  Service: Gastroenterology;  Laterality: N/A;  COLON POLYP, DIVERTICULOSIS    ENDOSCOPY N/A 5/3/2024    Procedure: ESOPHAGOGASTRODUODENOSCOPY WITH BIOPSIES AND 15-18MM BALLOON DILATATION;  Surgeon: " Amie Mcclure MD;  Location: [facility] ENDOSCOPY;  Service: Gastroenterology;  Laterality: N/A;  SCHATZKI'S RING, HIATAL HERNIA    TUBAL ABDOMINAL LIGATION  2008    UPPER GASTROINTESTINAL ENDOSCOPY           Current Outpatient Medications:     ibuprofen (ADVIL,MOTRIN) 800 MG tablet, Take 1 tablet by mouth Every 6 (Six) Hours As Needed for Moderate Pain., Disp: 60 tablet, Rfl: 0    lisinopril (PRINIVIL,ZESTRIL) 10 MG tablet, TAKE 1 TABLET BY MOUTH ONCE DAILY FOR BLOOD PRESSURE, Disp: 30 tablet, Rfl: 2    pantoprazole (PROTONIX) 20 MG EC tablet, Take 1 tablet by mouth Daily., Disp: 90 tablet, Rfl: 3    vitamin D (ERGOCALCIFEROL) 1.25 MG (57558 UT) capsule capsule, Take 1 capsule by mouth 1 (One) Time Per Week., Disp: 12 capsule, Rfl: 1     No Known Allergies    Family History   Problem Relation Age of Onset    Hypertension Mother     Diabetes Mother     Hypertension Brother     Colon cancer Maternal Grandmother         45s    Melanoma Neg Hx     Uterine cancer Neg Hx     Ovarian cancer Neg Hx     Breast cancer Neg Hx     Prostate cancer Neg Hx     Deep vein thrombosis Neg Hx     Pulmonary embolism Neg Hx     Coronary artery disease Neg Hx     Stroke Neg Hx     Osteoporosis Neg Hx         Social History     Social History Narrative    Not on file       Objective       Review of Systems   Constitutional:  Negative for appetite change, fatigue, fever, unexpected weight gain and unexpected weight loss.   HENT:  Negative for trouble swallowing.    Respiratory:  Negative for cough, choking, chest tightness, shortness of breath, wheezing and stridor.    Cardiovascular:  Negative for chest pain, palpitations and leg swelling.   Gastrointestinal:  Negative for abdominal distention, abdominal pain, anal bleeding, blood in stool, constipation, diarrhea, nausea, rectal pain, vomiting, GERD and indigestion.        Vital Signs:   /70 (BP Location: Left arm, Patient Position: Sitting, Cuff Size: Adult)   Pulse 65   Ht  "149.9 cm (59\")   Wt 85.3 kg (188 lb)   BMI 37.97 kg/m²       Physical Exam  Constitutional:       General: She is not in acute distress.     Appearance: She is well-developed. She is not ill-appearing.   HENT:      Head: Normocephalic.   Eyes:      Pupils: Pupils are equal, round, and reactive to light.   Cardiovascular:      Rate and Rhythm: Normal rate and regular rhythm.      Heart sounds: Normal heart sounds.   Pulmonary:      Effort: Pulmonary effort is normal.      Breath sounds: Normal breath sounds.   Abdominal:      General: Bowel sounds are normal. There is no distension.      Palpations: Abdomen is soft. There is no mass.      Tenderness: There is no abdominal tenderness. There is no guarding or rebound.      Hernia: No hernia is present.   Musculoskeletal:         General: Normal range of motion.   Skin:     General: Skin is warm and dry.   Neurological:      Mental Status: She is alert and oriented to person, place, and time.   Psychiatric:         Speech: Speech normal.         Behavior: Behavior normal.         Judgment: Judgment normal.           Assessment & Plan          Assessment and Plan    Diagnoses and all orders for this visit:    1. Gastroesophageal reflux disease with esophagitis without hemorrhage (Primary)  -     pantoprazole (PROTONIX) 20 MG EC tablet; Take 1 tablet by mouth Daily.  Dispense: 90 tablet; Refill: 3    2. Hiatal hernia    3. History of colon polyps    Reviewed EGD and colonoscopy results with her today.  GERD is definitely better on Protonix 20 mg daily.  We did discuss her coming off of it and at this time she does not want to do that.  It is noticeable when she misses a dose.  For now we will keep her on it for the next 6 months.  Then hopefully wean her off of it and maybe put her on Pepcid for maintenance.  We will see how she does.  Bowels moving well currently.  Okay to use Linzess as needed.  Patient to call the office with any issues.  Patient to follow-up with me " in 1 year.  Patient is agreeable to the plan.            Follow Up       Follow Up   Return in about 1 year (around 6/11/2025) for GERD.  Patient was given instructions and counseling regarding her condition or for health maintenance advice. Please see specific information pulled into the AVS if appropriate.

## 2024-06-12 ENCOUNTER — HOSPITAL ENCOUNTER (OUTPATIENT)
Dept: GENERAL RADIOLOGY | Facility: HOSPITAL | Age: 39
Discharge: HOME | End: 2024-06-12
Payer: COMMERCIAL

## 2024-06-12 ENCOUNTER — OFFICE VISIT (OUTPATIENT)
Dept: FAMILY MEDICINE CLINIC | Facility: CLINIC | Age: 39
End: 2024-06-12
Payer: COMMERCIAL

## 2024-06-12 VITALS
HEIGHT: 59 IN | RESPIRATION RATE: 16 BRPM | HEART RATE: 75 BPM | TEMPERATURE: 98.3 F | SYSTOLIC BLOOD PRESSURE: 119 MMHG | OXYGEN SATURATION: 95 % | DIASTOLIC BLOOD PRESSURE: 80 MMHG | BODY MASS INDEX: 37.9 KG/M2 | WEIGHT: 188 LBS

## 2024-06-12 DIAGNOSIS — M54.32 LEFT SCIATIC NERVE PAIN: ICD-10-CM

## 2024-06-12 DIAGNOSIS — M54.32 LEFT SCIATIC NERVE PAIN: Primary | ICD-10-CM

## 2024-06-12 PROCEDURE — 72100 X-RAY EXAM L-S SPINE 2/3 VWS: CPT

## 2024-06-12 PROCEDURE — 99213 OFFICE O/P EST LOW 20 MIN: CPT

## 2024-06-12 RX ORDER — CYCLOBENZAPRINE HCL 10 MG
10 TABLET ORAL 3 TIMES DAILY PRN
Qty: 42 TABLET | Refills: 0 | Status: SHIPPED | OUTPATIENT
Start: 2024-06-12 | End: 2024-06-26

## 2024-06-12 NOTE — PROGRESS NOTES
"Chief Complaint  Leg Pain (Left side x6 months.) and Shoulder Pain (Left shoulder)    Subjective        Neha May presents to [facility]  History of Present Illness  Neha is being seen in the office today for some left leg and left shoulder pain. She stated that she can reproduce her shoulder pain, but the left pain is concerning to her. She states the more she is on her leg, the more it hurts her. She states that the pain radiates from her lower back into her knee. She has been taking Ibuprofen 800mg for pain as needed. But she states that when the pain gets too intense that the ibuprofen does not touch the pain. She has been to the chiropractor 3 times. But she does not feel like it has helped. She states that she would be willing to try physical therapy to see if that helps. She has no other concerns at this time.     Leg Pain   The incident occurred more than 1 week ago. The incident occurred at home. The injury mechanism is unknown. The pain is present in the left leg, left hip and left knee. The pain is at a severity of 10/10. The pain is severe. The pain has been Intermittent since onset. It is unknown if a foreign body is present. The symptoms are aggravated by weight bearing. She has tried NSAIDs, rest, elevation and ice for the symptoms. The treatment provided mild relief.       Objective   Vital Signs:  /80 (BP Location: Left arm, Patient Position: Sitting, Cuff Size: Adult)   Pulse 75   Temp 98.3 °F (36.8 °C) (Temporal)   Resp 16   Ht 149.9 cm (59\")   Wt 85.3 kg (188 lb)   SpO2 95%   BMI 37.97 kg/m²   Estimated body mass index is 37.97 kg/m² as calculated from the following:    Height as of this encounter: 149.9 cm (59\").    Weight as of this encounter: 85.3 kg (188 lb).               Physical Exam  Constitutional:       Appearance: Normal appearance.   Cardiovascular:      Rate and Rhythm: Normal rate.      Pulses: Normal pulses.   Pulmonary:      " Effort: Pulmonary effort is normal.   Skin:     General: Skin is warm and dry.   Neurological:      General: No focal deficit present.      Mental Status: She is alert and oriented to person, place, and time.   Psychiatric:         Mood and Affect: Mood normal.         Behavior: Behavior normal.        Result Review :                     Assessment and Plan     Diagnoses and all orders for this visit:    1. Left sciatic nerve pain (Primary)  -     XR Spine Lumbar AP & Lateral; Future  -     cyclobenzaprine (FLEXERIL) 10 MG tablet; Take 1 tablet by mouth 3 (Three) Times a Day As Needed for Muscle Spasms for up to 14 days.  Dispense: 42 tablet; Refill: 0             Follow Up     No follow-ups on file.  Patient was given instructions and counseling regarding her condition or for health maintenance advice. Please see specific information pulled into the AVS if appropriate.

## 2024-06-20 RX ORDER — LISINOPRIL 10 MG/1
TABLET ORAL
Qty: 30 TABLET | Refills: 1 | Status: SHIPPED | OUTPATIENT
Start: 2024-06-20

## 2024-08-09 ENCOUNTER — OFFICE VISIT (OUTPATIENT)
Dept: FAMILY MEDICINE CLINIC | Facility: CLINIC | Age: 39
End: 2024-08-09
Payer: COMMERCIAL

## 2024-08-09 ENCOUNTER — HOSPITAL ENCOUNTER (OUTPATIENT)
Dept: GENERAL RADIOLOGY | Facility: HOSPITAL | Age: 39
Discharge: HOME | End: 2024-08-09
Admitting: FAMILY MEDICINE
Payer: COMMERCIAL

## 2024-08-09 VITALS
SYSTOLIC BLOOD PRESSURE: 118 MMHG | BODY MASS INDEX: 38.79 KG/M2 | WEIGHT: 192.4 LBS | RESPIRATION RATE: 18 BRPM | HEIGHT: 59 IN | DIASTOLIC BLOOD PRESSURE: 64 MMHG | HEART RATE: 77 BPM | TEMPERATURE: 97.8 F | OXYGEN SATURATION: 99 %

## 2024-08-09 DIAGNOSIS — I10 BENIGN ESSENTIAL HTN: Chronic | ICD-10-CM

## 2024-08-09 DIAGNOSIS — E66.01 CLASS 2 SEVERE OBESITY DUE TO EXCESS CALORIES WITH SERIOUS COMORBIDITY AND BODY MASS INDEX (BMI) OF 38.0 TO 38.9 IN ADULT: ICD-10-CM

## 2024-08-09 DIAGNOSIS — R10.9 ACUTE RIGHT FLANK PAIN: ICD-10-CM

## 2024-08-09 DIAGNOSIS — R10.9 ACUTE RIGHT FLANK PAIN: Primary | ICD-10-CM

## 2024-08-09 PROBLEM — E66.812 CLASS 2 SEVERE OBESITY DUE TO EXCESS CALORIES WITH SERIOUS COMORBIDITY AND BODY MASS INDEX (BMI) OF 38.0 TO 38.9 IN ADULT: Chronic | Status: ACTIVE | Noted: 2024-08-09

## 2024-08-09 PROBLEM — E66.9 OBESITY (BMI 30-39.9): Status: RESOLVED | Noted: 2021-08-02 | Resolved: 2024-08-09

## 2024-08-09 PROBLEM — E66.812 CLASS 2 SEVERE OBESITY DUE TO EXCESS CALORIES WITH SERIOUS COMORBIDITY AND BODY MASS INDEX (BMI) OF 38.0 TO 38.9 IN ADULT: Status: ACTIVE | Noted: 2024-08-09

## 2024-08-09 LAB
BILIRUB BLD-MCNC: NEGATIVE MG/DL
CLARITY, POC: CLEAR
COLOR UR: YELLOW
EXPIRATION DATE: NORMAL
GLUCOSE UR STRIP-MCNC: NEGATIVE MG/DL
KETONES UR QL: NEGATIVE
LEUKOCYTE EST, POC: NEGATIVE
Lab: NORMAL
NITRITE UR-MCNC: NEGATIVE MG/ML
PH UR: 6 [PH] (ref 5–8)
PROT UR STRIP-MCNC: NEGATIVE MG/DL
RBC # UR STRIP: NEGATIVE /UL
SP GR UR: 1.02 (ref 1–1.03)
UROBILINOGEN UR QL: NORMAL

## 2024-08-09 PROCEDURE — 74018 RADEX ABDOMEN 1 VIEW: CPT

## 2024-08-09 PROCEDURE — 81003 URINALYSIS AUTO W/O SCOPE: CPT | Performed by: FAMILY MEDICINE

## 2024-08-09 PROCEDURE — 99214 OFFICE O/P EST MOD 30 MIN: CPT | Performed by: FAMILY MEDICINE

## 2024-08-09 RX ORDER — TIRZEPATIDE 2.5 MG/.5ML
INJECTION, SOLUTION SUBCUTANEOUS
COMMUNITY
Start: 2024-05-08

## 2024-08-09 NOTE — PROGRESS NOTES
"Chief Complaint  Flank Pain (Right sided flank pain )    Subjective        Neha May presents to [facility]  History of Present Illness  She is here today for a follow-up for the management of her chronic medical conditions and for an acute visit. She is  and has two children and one granddaughter. She has GERD, esophageal stricture, kidney stone, HTN, obesity and vitamin D deficiency. She has had an esophageal stricture and has had her throat streatched. She has  a history of Colon CA is in the family.      Today she is having right sided flank pain. She has had it for the past 4 days.  She says movement may make it slightly worse and ibuprofen makes it better.  She denies hematuria, fever, chills, nausea or vomiting.  She has had a history of kidney stones.  She says the pains pretty much constant.     The patient has no complaints today and denies chest pain, shortness of breath, weakness, numbness, nausea, vomiting, diarrhea, dizziness or syncopal event.        Objective   Vital Signs:  /64 (BP Location: Left arm, Patient Position: Sitting, Cuff Size: Adult)   Pulse 77   Temp 97.8 °F (36.6 °C) (Tympanic)   Resp 18   Ht 149.9 cm (59.02\")   Wt 87.3 kg (192 lb 6.4 oz)   SpO2 99%   BMI 38.84 kg/m²   Estimated body mass index is 38.84 kg/m² as calculated from the following:    Height as of this encounter: 149.9 cm (59.02\").    Weight as of this encounter: 87.3 kg (192 lb 6.4 oz).               Physical Exam  Vitals reviewed.   Constitutional:       Appearance: She is well-developed. She is morbidly obese.   HENT:      Head: Normocephalic and atraumatic.      Right Ear: External ear normal.      Left Ear: External ear normal.      Mouth/Throat:      Pharynx: No oropharyngeal exudate.   Eyes:      Conjunctiva/sclera: Conjunctivae normal.      Pupils: Pupils are equal, round, and reactive to light.   Neck:      Vascular: No carotid bruit.   Cardiovascular: "      Rate and Rhythm: Normal rate and regular rhythm.      Heart sounds: No murmur heard.     No friction rub. No gallop.   Pulmonary:      Effort: Pulmonary effort is normal.      Breath sounds: Normal breath sounds. No wheezing or rhonchi.   Abdominal:      General: There is no distension.   Skin:     General: Skin is warm and dry.   Neurological:      Mental Status: She is alert and oriented to person, place, and time.      Cranial Nerves: No cranial nerve deficit.      Motor: No weakness.   Psychiatric:         Mood and Affect: Mood and affect normal.         Behavior: Behavior normal.         Thought Content: Thought content normal.         Judgment: Judgment normal.        Result Review :      CMP          2/22/2024    09:22   CMP   Glucose 97    BUN 13    Creatinine 0.76    EGFR 102.4    Sodium 136    Potassium 4.7    Chloride 103    Calcium 9.2    Total Protein 6.8    Albumin 4.2    Globulin 2.6    Total Bilirubin 0.4    Alkaline Phosphatase 68    AST (SGOT) 18    ALT (SGPT) 14    Albumin/Globulin Ratio 1.6    BUN/Creatinine Ratio 17.1    Anion Gap 13.1      CBC          2/22/2024    09:22   CBC   WBC 7.14    RBC 4.80    Hemoglobin 14.2    Hematocrit 42.8    MCV 89.2    MCH 29.6    MCHC 33.2    RDW 11.8    Platelets 282      Lipid Panel          2/22/2024    09:22   Lipid Panel   Total Cholesterol 181    Triglycerides 65    HDL Cholesterol 56    VLDL Cholesterol 12    LDL Cholesterol  113    LDL/HDL Ratio 2.00      TSH          2/22/2024    09:22   TSH   TSH 1.190                   Assessment and Plan     Diagnoses and all orders for this visit:    1. Acute right flank pain (Primary)  Comments:  I believe this pain she is experiencing is due to muscle strain.  Urinalysis was benign for blood.  KUB ordered to rule out stone.  Told to go to ER if worsen.  Orders:  -     POC Urinalysis Dipstick, Automated  -     XR Abdomen KUB; Future    2. Benign essential HTN  Assessment & Plan:  Hypertension is improving  with treatment.  Continue current treatment regimen.  Dietary sodium restriction.  Blood pressure will be reassessed at the next regular appointment.      3. Class 2 severe obesity due to excess calories with serious comorbidity and body mass index (BMI) of 38.0 to 38.9 in adult  Assessment & Plan:  Patient's (Body mass index is 38.84 kg/m².) indicates that they are morbidly/severely obese (BMI > 40 or > 35 with obesity - related health condition) with health conditions that include hypertension . Weight is worsening. BMI   elevated and she needs to follow-up with her PCP for further medical evaluation and management . We discussed                Follow Up     Return if symptoms worsen or fail to improve.  Patient was given instructions and counseling regarding her condition or for health maintenance advice. Please see specific information pulled into the AVS if appropriate.

## 2024-08-09 NOTE — ASSESSMENT & PLAN NOTE
Patient's (Body mass index is 38.84 kg/m².) indicates that they are morbidly/severely obese (BMI > 40 or > 35 with obesity - related health condition) with health conditions that include hypertension . Weight is worsening. BMI   elevated and she needs to follow-up with her PCP for further medical evaluation and management . We discussed

## 2024-08-19 PROCEDURE — 99284 EMERGENCY DEPT VISIT MOD MDM: CPT

## 2024-08-20 ENCOUNTER — HOSPITAL ENCOUNTER (EMERGENCY)
Facility: HOSPITAL | Age: 39
Discharge: HOME | End: 2024-08-20
Attending: EMERGENCY MEDICINE
Payer: COMMERCIAL

## 2024-08-20 ENCOUNTER — APPOINTMENT (OUTPATIENT)
Dept: CT IMAGING | Facility: HOSPITAL | Age: 39
End: 2024-08-20
Payer: COMMERCIAL

## 2024-08-20 VITALS
WEIGHT: 194 LBS | BODY MASS INDEX: 39.11 KG/M2 | SYSTOLIC BLOOD PRESSURE: 120 MMHG | DIASTOLIC BLOOD PRESSURE: 81 MMHG | TEMPERATURE: 98.4 F | OXYGEN SATURATION: 95 % | HEIGHT: 59 IN | HEART RATE: 67 BPM | RESPIRATION RATE: 18 BRPM

## 2024-08-20 DIAGNOSIS — N20.1 LEFT URETERAL STONE: Primary | ICD-10-CM

## 2024-08-20 DIAGNOSIS — N23 RENAL COLIC ON LEFT SIDE: ICD-10-CM

## 2024-08-20 LAB
ALBUMIN SERPL-MCNC: 3.8 G/DL (ref 3.5–5.2)
ALBUMIN/GLOB SERPL: 1.2 G/DL
ALP SERPL-CCNC: 84 U/L (ref 39–117)
ALT SERPL W P-5'-P-CCNC: 13 U/L (ref 1–33)
ANION GAP SERPL CALCULATED.3IONS-SCNC: 10.4 MMOL/L (ref 5–15)
AST SERPL-CCNC: 16 U/L (ref 1–32)
BACTERIA UR QL AUTO: ABNORMAL /HPF
BASOPHILS # BLD AUTO: 0.06 10*3/MM3 (ref 0–0.2)
BASOPHILS NFR BLD AUTO: 1 % (ref 0–1.5)
BILIRUB SERPL-MCNC: <0.2 MG/DL (ref 0–1.2)
BILIRUB UR QL STRIP: NEGATIVE
BUN SERPL-MCNC: 10 MG/DL (ref 6–20)
BUN/CREAT SERPL: 12.5 (ref 7–25)
CALCIUM SPEC-SCNC: 8.2 MG/DL (ref 8.6–10.5)
CHLORIDE SERPL-SCNC: 105 MMOL/L (ref 98–107)
CLARITY UR: CLEAR
CO2 SERPL-SCNC: 23.6 MMOL/L (ref 22–29)
COLOR UR: YELLOW
CREAT SERPL-MCNC: 0.8 MG/DL (ref 0.57–1)
DEPRECATED RDW RBC AUTO: 40.5 FL (ref 37–54)
EGFRCR SERPLBLD CKD-EPI 2021: 95.7 ML/MIN/1.73
EOSINOPHIL # BLD AUTO: 0.18 10*3/MM3 (ref 0–0.4)
EOSINOPHIL NFR BLD AUTO: 2.9 % (ref 0.3–6.2)
ERYTHROCYTE [DISTWIDTH] IN BLOOD BY AUTOMATED COUNT: 12.3 % (ref 12.3–15.4)
GLOBULIN UR ELPH-MCNC: 3.1 GM/DL
GLUCOSE SERPL-MCNC: 108 MG/DL (ref 65–99)
GLUCOSE UR STRIP-MCNC: NEGATIVE MG/DL
HCG INTACT+B SERPL-ACNC: <0.5 MIU/ML
HCT VFR BLD AUTO: 39.5 % (ref 34–46.6)
HGB BLD-MCNC: 13.3 G/DL (ref 12–15.9)
HGB UR QL STRIP.AUTO: ABNORMAL
HOLD SPECIMEN: NORMAL
HOLD SPECIMEN: NORMAL
HYALINE CASTS UR QL AUTO: ABNORMAL /LPF
IMM GRANULOCYTES # BLD AUTO: 0.02 10*3/MM3 (ref 0–0.05)
IMM GRANULOCYTES NFR BLD AUTO: 0.3 % (ref 0–0.5)
KETONES UR QL STRIP: NEGATIVE
LEUKOCYTE ESTERASE UR QL STRIP.AUTO: NEGATIVE
LIPASE SERPL-CCNC: 40 U/L (ref 13–60)
LYMPHOCYTES # BLD AUTO: 1.84 10*3/MM3 (ref 0.7–3.1)
LYMPHOCYTES NFR BLD AUTO: 29.3 % (ref 19.6–45.3)
MCH RBC QN AUTO: 30.4 PG (ref 26.6–33)
MCHC RBC AUTO-ENTMCNC: 33.7 G/DL (ref 31.5–35.7)
MCV RBC AUTO: 90.4 FL (ref 79–97)
MONOCYTES # BLD AUTO: 0.83 10*3/MM3 (ref 0.1–0.9)
MONOCYTES NFR BLD AUTO: 13.2 % (ref 5–12)
NEUTROPHILS NFR BLD AUTO: 3.36 10*3/MM3 (ref 1.7–7)
NEUTROPHILS NFR BLD AUTO: 53.3 % (ref 42.7–76)
NITRITE UR QL STRIP: NEGATIVE
NRBC BLD AUTO-RTO: 0 /100 WBC (ref 0–0.2)
PH UR STRIP.AUTO: 6 [PH] (ref 5–8)
PLATELET # BLD AUTO: 264 10*3/MM3 (ref 140–450)
PMV BLD AUTO: 10 FL (ref 6–12)
POTASSIUM SERPL-SCNC: 3.8 MMOL/L (ref 3.5–5.2)
PROT SERPL-MCNC: 6.9 G/DL (ref 6–8.5)
PROT UR QL STRIP: ABNORMAL
RBC # BLD AUTO: 4.37 10*6/MM3 (ref 3.77–5.28)
RBC # UR STRIP: ABNORMAL /HPF
REF LAB TEST METHOD: ABNORMAL
SODIUM SERPL-SCNC: 139 MMOL/L (ref 136–145)
SP GR UR STRIP: 1.02 (ref 1–1.03)
SQUAMOUS #/AREA URNS HPF: ABNORMAL /HPF
UROBILINOGEN UR QL STRIP: ABNORMAL
WBC # UR STRIP: ABNORMAL /HPF
WBC NRBC COR # BLD AUTO: 6.29 10*3/MM3 (ref 3.4–10.8)
WHOLE BLOOD HOLD COAG: NORMAL
WHOLE BLOOD HOLD SPECIMEN: NORMAL

## 2024-08-20 PROCEDURE — 96375 TX/PRO/DX INJ NEW DRUG ADDON: CPT

## 2024-08-20 PROCEDURE — 36415 COLL VENOUS BLD VENIPUNCTURE: CPT

## 2024-08-20 PROCEDURE — 85025 COMPLETE CBC W/AUTO DIFF WBC: CPT

## 2024-08-20 PROCEDURE — 84702 CHORIONIC GONADOTROPIN TEST: CPT

## 2024-08-20 PROCEDURE — 25010000002 KETOROLAC TROMETHAMINE PER 15 MG: Performed by: NURSE PRACTITIONER

## 2024-08-20 PROCEDURE — 96374 THER/PROPH/DIAG INJ IV PUSH: CPT

## 2024-08-20 PROCEDURE — 80053 COMPREHEN METABOLIC PANEL: CPT

## 2024-08-20 PROCEDURE — 25010000002 HYDROMORPHONE 1 MG/ML SOLUTION: Performed by: EMERGENCY MEDICINE

## 2024-08-20 PROCEDURE — 81001 URINALYSIS AUTO W/SCOPE: CPT

## 2024-08-20 PROCEDURE — 25010000002 ONDANSETRON PER 1 MG: Performed by: NURSE PRACTITIONER

## 2024-08-20 PROCEDURE — 83690 ASSAY OF LIPASE: CPT

## 2024-08-20 PROCEDURE — 74176 CT ABD & PELVIS W/O CONTRAST: CPT

## 2024-08-20 PROCEDURE — 25810000003 SODIUM CHLORIDE 0.9 % SOLUTION: Performed by: NURSE PRACTITIONER

## 2024-08-20 RX ORDER — TAMSULOSIN HYDROCHLORIDE 0.4 MG/1
1 CAPSULE ORAL DAILY
Qty: 7 CAPSULE | Refills: 0 | Status: ON HOLD | OUTPATIENT
Start: 2024-08-20 | End: 2024-08-27

## 2024-08-20 RX ORDER — ONDANSETRON 2 MG/ML
4 INJECTION INTRAMUSCULAR; INTRAVENOUS ONCE
Status: COMPLETED | OUTPATIENT
Start: 2024-08-20 | End: 2024-08-20

## 2024-08-20 RX ORDER — KETOROLAC TROMETHAMINE 30 MG/ML
30 INJECTION, SOLUTION INTRAMUSCULAR; INTRAVENOUS ONCE
Status: COMPLETED | OUTPATIENT
Start: 2024-08-20 | End: 2024-08-20

## 2024-08-20 RX ORDER — KETOROLAC TROMETHAMINE 10 MG/1
10 TABLET, FILM COATED ORAL EVERY 6 HOURS PRN
Qty: 15 TABLET | Refills: 0 | Status: ON HOLD | OUTPATIENT
Start: 2024-08-20 | End: 2024-08-30

## 2024-08-20 RX ORDER — SODIUM CHLORIDE 0.9 % (FLUSH) 0.9 %
10 SYRINGE (ML) INJECTION AS NEEDED
Status: DISCONTINUED | OUTPATIENT
Start: 2024-08-20 | End: 2024-08-20 | Stop reason: HOSPADM

## 2024-08-20 RX ORDER — OXYCODONE AND ACETAMINOPHEN 5; 325 MG/1; MG/1
1 TABLET ORAL EVERY 6 HOURS PRN
Qty: 15 TABLET | Refills: 0 | Status: SHIPPED | OUTPATIENT
Start: 2024-08-20 | End: 2024-08-30 | Stop reason: HOSPADM

## 2024-08-20 RX ORDER — ONDANSETRON 4 MG/1
4 TABLET, ORALLY DISINTEGRATING ORAL 4 TIMES DAILY PRN
Qty: 15 TABLET | Refills: 0 | Status: ON HOLD | OUTPATIENT
Start: 2024-08-20 | End: 2024-08-30

## 2024-08-20 RX ADMIN — SODIUM CHLORIDE 500 ML: 9 INJECTION, SOLUTION INTRAVENOUS at 02:55

## 2024-08-20 RX ADMIN — ONDANSETRON HYDROCHLORIDE 4 MG: 2 SOLUTION INTRAMUSCULAR; INTRAVENOUS at 02:55

## 2024-08-20 RX ADMIN — KETOROLAC TROMETHAMINE 30 MG: 30 INJECTION, SOLUTION INTRAMUSCULAR; INTRAVENOUS at 02:55

## 2024-08-20 RX ADMIN — HYDROMORPHONE HYDROCHLORIDE 1 MG: 1 INJECTION, SOLUTION INTRAMUSCULAR; INTRAVENOUS; SUBCUTANEOUS at 04:05

## 2024-08-20 NOTE — DISCHARGE INSTRUCTIONS
Rest, drink plenty of fluids.  Take your meds as prescribed.  Follow-up with your family doctor as needed.  Call Dr. Darby office today.  Advised them of your ER visit and follow-up with them as directed.  Return to the emergency department immediately for any acutely developing fever of 101 or greater, any persistent vomiting, any unmanaged pain at home with your medications or any new or worse concerns.

## 2024-08-20 NOTE — ED PROVIDER NOTES
Subjective     History provided by:  Patient   used: No        Review of Systems    Past Medical History:   Diagnosis Date    Colon polyp     Have had colonoscopy bc of family histoey - polyps seen - no sognoficant fondings    Foot pain, left     GERD (gastroesophageal reflux disease)     Heel pain     Hypertension     Take a low dose of kedication daily    Numbness in feet        No Known Allergies    Past Surgical History:   Procedure Laterality Date     SECTION      COLONOSCOPY      COLONOSCOPY N/A 5/3/2024    Procedure: COLONOSCOPY WITH COLD SNARE POLYPECTOMY;  Surgeon: Amie Mcclure MD;  Location: [facility] ENDOSCOPY;  Service: Gastroenterology;  Laterality: N/A;  COLON POLYP, DIVERTICULOSIS    ENDOSCOPY N/A 5/3/2024    Procedure: ESOPHAGOGASTRODUODENOSCOPY WITH BIOPSIES AND 15-18MM BALLOON DILATATION;  Surgeon: Amie Mcclure MD;  Location: [facility] ENDOSCOPY;  Service: Gastroenterology;  Laterality: N/A;  SCHATZKI'S RING, HIATAL HERNIA    TUBAL ABDOMINAL LIGATION      UPPER GASTROINTESTINAL ENDOSCOPY         Family History   Problem Relation Age of Onset    Hypertension Mother     Diabetes Mother     Hypertension Brother     Colon cancer Maternal Grandmother         45s    Melanoma Neg Hx     Uterine cancer Neg Hx     Ovarian cancer Neg Hx     Breast cancer Neg Hx     Prostate cancer Neg Hx     Deep vein thrombosis Neg Hx     Pulmonary embolism Neg Hx     Coronary artery disease Neg Hx     Stroke Neg Hx     Osteoporosis Neg Hx        Social History     Socioeconomic History    Marital status:    Tobacco Use    Smoking status: Never     Passive exposure: Past    Smokeless tobacco: Never   Vaping Use    Vaping status: Never Used   Substance and Sexual Activity    Alcohol use: Never    Drug use: Never    Sexual activity: Yes     Partners: Male     Birth control/protection: Tubal ligation           Objective   Physical Exam    Procedures           ED  Course                                             Medical Decision Making  Amount and/or Complexity of Data Reviewed  Labs: ordered.  Radiology: ordered.    Risk  Prescription drug management.        Final diagnoses:   None       ED Disposition  ED Disposition       None            No follow-up provider specified.       Medication List      No changes were made to your prescriptions during this visit.

## 2024-08-20 NOTE — ED TRIAGE NOTES
Patient to ED from home with left flank pain x 1 week. Was seen at Pcp when symptoms started. States she was told she had KHADIJAH stones.   Denies dysuria, hematuria.

## 2024-08-20 NOTE — PROGRESS NOTES
Chief Complaint: Flank Pain    Subjective         History of Present Illness  Neha May is a 39 y.o. female presents to [facility] UROLOGY to be seen for ureteral stone.    Fever- denies    Nausea/vomiting- admits    GH-denies    History of stones-admits, passed     surgeries-denies    Family history of  malignancy-denies    Cardiopulmonary-denies    Anticoagulants-denies    Smoker-denies    CT ABDOMEN PELVIS WO CONTRAST     Date of Exam: 8/20/2024 1:07 AM EDT     Indication: Left-sided abdominal and flank pain with nausea. History of kidney stone and diverticulitis.     Comparison: 4/7/2023     Technique: Axial CT images were obtained of the abdomen and pelvis without the administration of contrast. Reconstructed coronal and sagittal images were also obtained. Automated exposure control and iterative construction methods were used.     Findings:  Lung bases are clear. Abdominal aorta is normal in caliber. Gallbladder is normal. No biliary obstruction is seen. There are numerous bilateral nonobstructing kidney stones. Increased density in the medullary pyramids of both kidneys probably reflects   medullary sponge kidney. There is mild left hydronephrosis due to a 4.3 mm stone at the UPJ. No other ureteral stones are seen. No right-sided hydronephrosis. The solid abdominal organs are otherwise normal. Urinary bladder is normal. Solid pelvic organs   are grossly normal.     The cecum extends across the midline into the left lower quadrant. The appendix is normal. The large bowel is otherwise normal without evidence of acute colitis. No small bowel obstruction is identified. Stomach is normal. There is a small fat-containing   umbilical hernia that is similar to prior. No adenopathy or free fluid is seen.     IMPRESSION:     1. Mild left hydronephrosis due to a 4.3 mm stone at the UPJ.  2. Numerous small bilateral nonobstructing kidney stones with evidence of bilateral medullary sponge  kidney.  3. No acute findings in the GI tract. The appendix is normal.                 Electronically Signed: Wilder Humphrey MD    2024 1:45 AM EDT     UA  2024 blood 3+ protein trace nitrite negative    Objective     Past Medical History:   Diagnosis Date    Colon polyp     Have had colonoscopy bc of family histoey - polyps seen - no sognoficant fondings    Foot pain, left     GERD (gastroesophageal reflux disease)     Heel pain     Hypertension     Take a low dose of kedication daily    Numbness in feet        Past Surgical History:   Procedure Laterality Date     SECTION      COLONOSCOPY      COLONOSCOPY N/A 5/3/2024    Procedure: COLONOSCOPY WITH COLD SNARE POLYPECTOMY;  Surgeon: Amie Mcclure MD;  Location: [facility] ENDOSCOPY;  Service: Gastroenterology;  Laterality: N/A;  COLON POLYP, DIVERTICULOSIS    ENDOSCOPY N/A 5/3/2024    Procedure: ESOPHAGOGASTRODUODENOSCOPY WITH BIOPSIES AND 15-18MM BALLOON DILATATION;  Surgeon: Amie Mcclure MD;  Location: [facility] ENDOSCOPY;  Service: Gastroenterology;  Laterality: N/A;  SCHATZKI'S RING, HIATAL HERNIA    TUBAL ABDOMINAL LIGATION      UPPER GASTROINTESTINAL ENDOSCOPY           Current Outpatient Medications:     ketorolac (TORADOL) 10 MG tablet, Take 1 tablet by mouth Every 6 (Six) Hours As Needed for Moderate Pain., Disp: 15 tablet, Rfl: 0    lisinopril (PRINIVIL,ZESTRIL) 10 MG tablet, TAKE 1 TABLET BY MOUTH ONCE DAILY FOR BLOOD PRESSURE, Disp: 30 tablet, Rfl: 1    ondansetron ODT (ZOFRAN-ODT) 4 MG disintegrating tablet, Place 1 tablet on the tongue 4 (Four) Times a Day As Needed for Nausea or Vomiting., Disp: 15 tablet, Rfl: 0    oxyCODONE-acetaminophen (PERCOCET) 5-325 MG per tablet, Take 1 tablet by mouth Every 6 (Six) Hours As Needed for Moderate Pain., Disp: 15 tablet, Rfl: 0    pantoprazole (PROTONIX) 20 MG EC tablet, Take 1 tablet by mouth Daily., Disp: 90 tablet, Rfl: 3    tamsulosin (FLOMAX) 0.4 MG capsule 24 hr  "capsule, Take 1 capsule by mouth Daily., Disp: 7 capsule, Rfl: 0    vitamin D (ERGOCALCIFEROL) 1.25 MG (01555 UT) capsule capsule, Take 1 capsule by mouth 1 (One) Time Per Week., Disp: 12 capsule, Rfl: 1    Zepbound 2.5 MG/0.5ML solution auto-injector, , Disp: , Rfl:     No Known Allergies     Family History   Problem Relation Age of Onset    Hypertension Mother     Diabetes Mother     Hypertension Brother     Colon cancer Maternal Grandmother         45s    Melanoma Neg Hx     Uterine cancer Neg Hx     Ovarian cancer Neg Hx     Breast cancer Neg Hx     Prostate cancer Neg Hx     Deep vein thrombosis Neg Hx     Pulmonary embolism Neg Hx     Coronary artery disease Neg Hx     Stroke Neg Hx     Osteoporosis Neg Hx        Social History     Socioeconomic History    Marital status:    Tobacco Use    Smoking status: Never     Passive exposure: Past    Smokeless tobacco: Never   Vaping Use    Vaping status: Never Used   Substance and Sexual Activity    Alcohol use: Never    Drug use: Never    Sexual activity: Yes     Partners: Male     Birth control/protection: Tubal ligation       Vital Signs:   /71 (BP Location: Right arm, Patient Position: Sitting, Cuff Size: Large Adult)   Pulse 72   Ht 149.9 cm (59\")   Wt 88 kg (194 lb 0.1 oz)   BMI 39.18 kg/m²      Physical Exam  Vitals reviewed.   Constitutional:       Appearance: Normal appearance.   Neurological:      General: No focal deficit present.      Mental Status: She is alert and oriented to person, place, and time.   Psychiatric:         Mood and Affect: Mood normal.         Behavior: Behavior normal.          Result Review :   The following data was reviewed by: VICKIE Sol on 08/21/2024:  Results for orders placed or performed in visit on 08/21/24   POC Urinalysis Dipstick, Automated    Specimen: Urine   Result Value Ref Range    Color Yellow Yellow, Straw, Dark Yellow, Fiorella    Clarity, UA Clear Clear    Specific Gravity  1.020 1.005 " - 1.030    pH, Urine 5.5 5.0 - 8.0    Leukocytes Trace (A) Negative    Nitrite, UA Negative Negative    Protein, POC Negative Negative mg/dL    Glucose, UA Negative Negative mg/dL    Ketones, UA Negative Negative    Urobilinogen, UA 0.2 E.U./dL Normal, 0.2 E.U./dL    Bilirubin Negative Negative    Blood, UA Moderate (A) Negative    Lot Number [id number]     Expiration Date 725             Procedures        Assessment and Plan    Diagnoses and all orders for this visit:    1. Kidney stone (Primary)  -     Cancel: POC Urinalysis Dipstick, Automated    2. Hematuria, unspecified type  -     POC Urinalysis Dipstick, Automated  -     Cancel: POC Urinalysis Dipstick, Automated    3. Ureteral stone  -     XR Abdomen KUB; Future  -     US Renal Bilateral; Future       presents for further evaluation of ureteral stone after presentation to the ER for severe, acute, stabbing left sided flank pain. Also notes associated nausea; denies any fever.  CT performed in the ER, indicated a left ureteral calculi about 4.3mm. Symptoms were controlled and patient was discharged home for continued trial of passage. Since discharge from ER, there has been 1 further pain episodes.  Denies fever or nausea since leaving ER.  Still has discomfort but not significant pain like she had last night.  She has not been straining her urine.  Has not seen a stone pass.    History of nephrolithiasis-has had multiple stones that she has passed without intervention in the past.    After discussion of risk and benefits of procedure and her current pain level, she would like to continue with a trial of passage.  We did discuss that there is a 60% chance of passing the stone based on the size.  We also discussed that she should hold off on her weight loss medication till the stone passes to ensure that if she needs anesthesia there is no problem with that.  She does report she has not taken it in quite a while.    She will plan to do a KUB in 2 weeks if the  stone has not passed.  Will also plan to do an ultrasound in 1 month.  If the hydronephrosis continues then I will proceed on with a CT scan if she has not called for getting on the OR schedule before that.  We did discuss that if she develops fever she is to go to the ER if her pain becomes uncontrollable she should also go to the ER.  She did reports she may call back later today and decide that she does want to have the stone removed.  If she does that I will get her set up for this procedure.  We have already discussed risk and benefits as well as instructions.    I will plan to see her back in 5 weeks with ultrasound prior.    She was given information about kidney stone prevention.      Follow Up   Return in about 5 weeks (around 9/25/2024).  Patient was given instructions and counseling regarding her condition or for health maintenance advice. Please see specific information pulled into the AVS if appropriate.         This document has been electronically signed by VICKIE Sol  August 21, 2024 08:36 EDT

## 2024-08-21 ENCOUNTER — OFFICE VISIT (OUTPATIENT)
Dept: UROLOGY | Facility: CLINIC | Age: 39
End: 2024-08-21
Payer: COMMERCIAL

## 2024-08-21 VITALS
BODY MASS INDEX: 39.11 KG/M2 | WEIGHT: 194 LBS | HEIGHT: 59 IN | HEART RATE: 72 BPM | SYSTOLIC BLOOD PRESSURE: 122 MMHG | DIASTOLIC BLOOD PRESSURE: 71 MMHG

## 2024-08-21 DIAGNOSIS — N20.1 URETERAL STONE: ICD-10-CM

## 2024-08-21 DIAGNOSIS — N20.0 KIDNEY STONE: Primary | ICD-10-CM

## 2024-08-21 DIAGNOSIS — R31.9 HEMATURIA, UNSPECIFIED TYPE: ICD-10-CM

## 2024-08-21 LAB
BILIRUB BLD-MCNC: NEGATIVE MG/DL
CLARITY, POC: CLEAR
COLOR UR: YELLOW
EXPIRATION DATE: 725
GLUCOSE UR STRIP-MCNC: NEGATIVE MG/DL
KETONES UR QL: NEGATIVE
LEUKOCYTE EST, POC: ABNORMAL
Lab: ABNORMAL
NITRITE UR-MCNC: NEGATIVE MG/ML
PH UR: 5.5 [PH] (ref 5–8)
PROT UR STRIP-MCNC: NEGATIVE MG/DL
RBC # UR STRIP: ABNORMAL /UL
SP GR UR: 1.02 (ref 1–1.03)
UROBILINOGEN UR QL: ABNORMAL

## 2024-08-21 RX ORDER — LISINOPRIL 10 MG/1
TABLET ORAL
Qty: 30 TABLET | Refills: 0 | Status: SHIPPED | OUTPATIENT
Start: 2024-08-21

## 2024-08-22 ENCOUNTER — ANESTHESIA EVENT (OUTPATIENT)
Dept: PERIOP | Facility: HOSPITAL | Age: 39
End: 2024-08-22
Payer: COMMERCIAL

## 2024-08-22 ENCOUNTER — ANESTHESIA (OUTPATIENT)
Dept: PERIOP | Facility: HOSPITAL | Age: 39
End: 2024-08-22
Payer: COMMERCIAL

## 2024-08-22 ENCOUNTER — TELEPHONE (OUTPATIENT)
Dept: UROLOGY | Facility: CLINIC | Age: 39
End: 2024-08-22
Payer: COMMERCIAL

## 2024-08-22 ENCOUNTER — APPOINTMENT (OUTPATIENT)
Dept: GENERAL RADIOLOGY | Facility: HOSPITAL | Age: 39
End: 2024-08-22
Payer: COMMERCIAL

## 2024-08-22 ENCOUNTER — PREP FOR SURGERY (OUTPATIENT)
Dept: OTHER | Facility: HOSPITAL | Age: 39
End: 2024-08-22
Payer: COMMERCIAL

## 2024-08-22 ENCOUNTER — HOSPITAL ENCOUNTER (OUTPATIENT)
Facility: HOSPITAL | Age: 39
Setting detail: HOSPITAL OUTPATIENT SURGERY
Discharge: HOME | End: 2024-08-22
Attending: UROLOGY | Admitting: UROLOGY
Payer: COMMERCIAL

## 2024-08-22 VITALS
SYSTOLIC BLOOD PRESSURE: 111 MMHG | OXYGEN SATURATION: 91 % | TEMPERATURE: 97.3 F | RESPIRATION RATE: 12 BRPM | DIASTOLIC BLOOD PRESSURE: 65 MMHG | HEIGHT: 59 IN | BODY MASS INDEX: 39.24 KG/M2 | HEART RATE: 65 BPM | WEIGHT: 194.67 LBS

## 2024-08-22 DIAGNOSIS — N20.1 LEFT URETERAL STONE: ICD-10-CM

## 2024-08-22 DIAGNOSIS — N20.1 LEFT URETERAL STONE: Primary | ICD-10-CM

## 2024-08-22 PROCEDURE — C2617 STENT, NON-COR, TEM W/O DEL: HCPCS | Performed by: UROLOGY

## 2024-08-22 PROCEDURE — C1894 INTRO/SHEATH, NON-LASER: HCPCS | Performed by: UROLOGY

## 2024-08-22 PROCEDURE — 25010000002 MIDAZOLAM PER 1MG: Performed by: ANESTHESIOLOGY

## 2024-08-22 PROCEDURE — 25010000002 FENTANYL CITRATE (PF) 50 MCG/ML SOLUTION: Performed by: NURSE ANESTHETIST, CERTIFIED REGISTERED

## 2024-08-22 PROCEDURE — C1769 GUIDE WIRE: HCPCS | Performed by: UROLOGY

## 2024-08-22 PROCEDURE — 25010000002 SUGAMMADEX 200 MG/2ML SOLUTION: Performed by: NURSE ANESTHETIST, CERTIFIED REGISTERED

## 2024-08-22 PROCEDURE — 25010000002 DEXAMETHASONE PER 1 MG: Performed by: NURSE ANESTHETIST, CERTIFIED REGISTERED

## 2024-08-22 PROCEDURE — 52356 CYSTO/URETERO W/LITHOTRIPSY: CPT | Performed by: UROLOGY

## 2024-08-22 PROCEDURE — 76000 FLUOROSCOPY <1 HR PHYS/QHP: CPT

## 2024-08-22 PROCEDURE — 25010000002 ONDANSETRON PER 1 MG: Performed by: NURSE ANESTHETIST, CERTIFIED REGISTERED

## 2024-08-22 PROCEDURE — 88300 SURGICAL PATH GROSS: CPT | Performed by: UROLOGY

## 2024-08-22 PROCEDURE — 25810000003 LACTATED RINGERS PER 1000 ML: Performed by: ANESTHESIOLOGY

## 2024-08-22 PROCEDURE — 25010000002 PROPOFOL 10 MG/ML EMULSION: Performed by: NURSE ANESTHETIST, CERTIFIED REGISTERED

## 2024-08-22 PROCEDURE — 25010000002 HYDROMORPHONE 1 MG/ML SOLUTION: Performed by: NURSE ANESTHETIST, CERTIFIED REGISTERED

## 2024-08-22 PROCEDURE — 25010000002 CEFAZOLIN PER 500 MG: Performed by: NURSE ANESTHETIST, CERTIFIED REGISTERED

## 2024-08-22 PROCEDURE — C1758 CATHETER, URETERAL: HCPCS | Performed by: UROLOGY

## 2024-08-22 PROCEDURE — 82365 CALCULUS SPECTROSCOPY: CPT | Performed by: UROLOGY

## 2024-08-22 DEVICE — URETERAL STENT
Type: IMPLANTABLE DEVICE | Site: URETER | Status: FUNCTIONAL
Brand: ASCERTA™

## 2024-08-22 RX ORDER — ONDANSETRON 4 MG/1
4 TABLET, ORALLY DISINTEGRATING ORAL ONCE AS NEEDED
Status: DISCONTINUED | OUTPATIENT
Start: 2024-08-22 | End: 2024-08-22 | Stop reason: HOSPADM

## 2024-08-22 RX ORDER — SODIUM CHLORIDE 0.9 % (FLUSH) 0.9 %
10 SYRINGE (ML) INJECTION AS NEEDED
Status: DISCONTINUED | OUTPATIENT
Start: 2024-08-22 | End: 2024-08-22 | Stop reason: HOSPADM

## 2024-08-22 RX ORDER — MIDAZOLAM HYDROCHLORIDE 2 MG/2ML
2 INJECTION, SOLUTION INTRAMUSCULAR; INTRAVENOUS ONCE
Status: COMPLETED | OUTPATIENT
Start: 2024-08-22 | End: 2024-08-22

## 2024-08-22 RX ORDER — ONDANSETRON 2 MG/ML
4 INJECTION INTRAMUSCULAR; INTRAVENOUS ONCE AS NEEDED
Status: DISCONTINUED | OUTPATIENT
Start: 2024-08-22 | End: 2024-08-22 | Stop reason: HOSPADM

## 2024-08-22 RX ORDER — SODIUM CHLORIDE 9 MG/ML
100 INJECTION, SOLUTION INTRAVENOUS CONTINUOUS
Status: DISCONTINUED | OUTPATIENT
Start: 2024-08-22 | End: 2024-08-22 | Stop reason: HOSPADM

## 2024-08-22 RX ORDER — OXYCODONE HYDROCHLORIDE 5 MG/1
5 TABLET ORAL
Status: COMPLETED | OUTPATIENT
Start: 2024-08-22 | End: 2024-08-22

## 2024-08-22 RX ORDER — HYDROCODONE BITARTRATE AND ACETAMINOPHEN 5; 325 MG/1; MG/1
1 TABLET ORAL EVERY 6 HOURS PRN
Qty: 12 TABLET | Refills: 0 | Status: ON HOLD | OUTPATIENT
Start: 2024-08-22 | End: 2024-08-30

## 2024-08-22 RX ORDER — SODIUM CHLORIDE 9 MG/ML
40 INJECTION, SOLUTION INTRAVENOUS AS NEEDED
Status: CANCELLED | OUTPATIENT
Start: 2024-08-22

## 2024-08-22 RX ORDER — HYDROCODONE BITARTRATE AND ACETAMINOPHEN 5; 325 MG/1; MG/1
1 TABLET ORAL ONCE AS NEEDED
Status: DISCONTINUED | OUTPATIENT
Start: 2024-08-22 | End: 2024-08-22 | Stop reason: HOSPADM

## 2024-08-22 RX ORDER — FENTANYL CITRATE 50 UG/ML
INJECTION, SOLUTION INTRAMUSCULAR; INTRAVENOUS AS NEEDED
Status: DISCONTINUED | OUTPATIENT
Start: 2024-08-22 | End: 2024-08-22 | Stop reason: SURG

## 2024-08-22 RX ORDER — MEPERIDINE HYDROCHLORIDE 25 MG/ML
12.5 INJECTION INTRAMUSCULAR; INTRAVENOUS; SUBCUTANEOUS
Status: DISCONTINUED | OUTPATIENT
Start: 2024-08-22 | End: 2024-08-22 | Stop reason: HOSPADM

## 2024-08-22 RX ORDER — LIDOCAINE HYDROCHLORIDE 20 MG/ML
INJECTION, SOLUTION EPIDURAL; INFILTRATION; INTRACAUDAL; PERINEURAL AS NEEDED
Status: DISCONTINUED | OUTPATIENT
Start: 2024-08-22 | End: 2024-08-22 | Stop reason: SURG

## 2024-08-22 RX ORDER — SODIUM CHLORIDE 9 MG/ML
100 INJECTION, SOLUTION INTRAVENOUS CONTINUOUS
Status: CANCELLED | OUTPATIENT
Start: 2024-08-22

## 2024-08-22 RX ORDER — SODIUM CHLORIDE, SODIUM LACTATE, POTASSIUM CHLORIDE, CALCIUM CHLORIDE 600; 310; 30; 20 MG/100ML; MG/100ML; MG/100ML; MG/100ML
9 INJECTION, SOLUTION INTRAVENOUS CONTINUOUS PRN
Status: DISCONTINUED | OUTPATIENT
Start: 2024-08-22 | End: 2024-08-22 | Stop reason: HOSPADM

## 2024-08-22 RX ORDER — PROMETHAZINE HYDROCHLORIDE 12.5 MG/1
25 TABLET ORAL ONCE AS NEEDED
Status: DISCONTINUED | OUTPATIENT
Start: 2024-08-22 | End: 2024-08-22 | Stop reason: HOSPADM

## 2024-08-22 RX ORDER — SODIUM CHLORIDE 0.9 % (FLUSH) 0.9 %
3 SYRINGE (ML) INJECTION EVERY 12 HOURS SCHEDULED
Status: CANCELLED | OUTPATIENT
Start: 2024-08-22

## 2024-08-22 RX ORDER — PROPOFOL 10 MG/ML
VIAL (ML) INTRAVENOUS AS NEEDED
Status: DISCONTINUED | OUTPATIENT
Start: 2024-08-22 | End: 2024-08-22 | Stop reason: SURG

## 2024-08-22 RX ORDER — ONDANSETRON 2 MG/ML
INJECTION INTRAMUSCULAR; INTRAVENOUS AS NEEDED
Status: DISCONTINUED | OUTPATIENT
Start: 2024-08-22 | End: 2024-08-22 | Stop reason: SURG

## 2024-08-22 RX ORDER — MAGNESIUM HYDROXIDE 1200 MG/15ML
LIQUID ORAL AS NEEDED
Status: DISCONTINUED | OUTPATIENT
Start: 2024-08-22 | End: 2024-08-22 | Stop reason: HOSPADM

## 2024-08-22 RX ORDER — SODIUM CHLORIDE 0.9 % (FLUSH) 0.9 %
3 SYRINGE (ML) INJECTION EVERY 12 HOURS SCHEDULED
Status: DISCONTINUED | OUTPATIENT
Start: 2024-08-22 | End: 2024-08-22 | Stop reason: HOSPADM

## 2024-08-22 RX ORDER — SODIUM CHLORIDE 9 MG/ML
40 INJECTION, SOLUTION INTRAVENOUS AS NEEDED
Status: DISCONTINUED | OUTPATIENT
Start: 2024-08-22 | End: 2024-08-22 | Stop reason: HOSPADM

## 2024-08-22 RX ORDER — SODIUM CHLORIDE 0.9 % (FLUSH) 0.9 %
10 SYRINGE (ML) INJECTION AS NEEDED
Status: CANCELLED | OUTPATIENT
Start: 2024-08-22

## 2024-08-22 RX ORDER — ROCURONIUM BROMIDE 10 MG/ML
INJECTION, SOLUTION INTRAVENOUS AS NEEDED
Status: DISCONTINUED | OUTPATIENT
Start: 2024-08-22 | End: 2024-08-22 | Stop reason: SURG

## 2024-08-22 RX ORDER — ACETAMINOPHEN 325 MG/1
650 TABLET ORAL ONCE
Status: DISCONTINUED | OUTPATIENT
Start: 2024-08-22 | End: 2024-08-22 | Stop reason: HOSPADM

## 2024-08-22 RX ORDER — DEXAMETHASONE SODIUM PHOSPHATE 4 MG/ML
INJECTION, SOLUTION INTRA-ARTICULAR; INTRALESIONAL; INTRAMUSCULAR; INTRAVENOUS; SOFT TISSUE AS NEEDED
Status: DISCONTINUED | OUTPATIENT
Start: 2024-08-22 | End: 2024-08-22 | Stop reason: SURG

## 2024-08-22 RX ORDER — PROMETHAZINE HYDROCHLORIDE 25 MG/1
25 SUPPOSITORY RECTAL ONCE AS NEEDED
Status: DISCONTINUED | OUTPATIENT
Start: 2024-08-22 | End: 2024-08-22 | Stop reason: HOSPADM

## 2024-08-22 RX ORDER — CEFAZOLIN SODIUM 1 G/3ML
INJECTION, POWDER, FOR SOLUTION INTRAMUSCULAR; INTRAVENOUS AS NEEDED
Status: DISCONTINUED | OUTPATIENT
Start: 2024-08-22 | End: 2024-08-22 | Stop reason: SURG

## 2024-08-22 RX ORDER — PROMETHAZINE HYDROCHLORIDE 12.5 MG/1
12.5 TABLET ORAL ONCE AS NEEDED
Status: DISCONTINUED | OUTPATIENT
Start: 2024-08-22 | End: 2024-08-22 | Stop reason: HOSPADM

## 2024-08-22 RX ADMIN — FENTANYL CITRATE 25 MCG: 50 INJECTION, SOLUTION INTRAMUSCULAR; INTRAVENOUS at 13:06

## 2024-08-22 RX ADMIN — ROCURONIUM BROMIDE 40 MG: 10 INJECTION, SOLUTION INTRAVENOUS at 12:31

## 2024-08-22 RX ADMIN — SUGAMMADEX 200 MG: 100 INJECTION, SOLUTION INTRAVENOUS at 13:05

## 2024-08-22 RX ADMIN — FENTANYL CITRATE 50 MCG: 50 INJECTION, SOLUTION INTRAMUSCULAR; INTRAVENOUS at 12:29

## 2024-08-22 RX ADMIN — ONDANSETRON HYDROCHLORIDE 4 MG: 2 SOLUTION INTRAMUSCULAR; INTRAVENOUS at 12:46

## 2024-08-22 RX ADMIN — FENTANYL CITRATE 25 MCG: 50 INJECTION, SOLUTION INTRAMUSCULAR; INTRAVENOUS at 12:48

## 2024-08-22 RX ADMIN — SODIUM CHLORIDE, POTASSIUM CHLORIDE, SODIUM LACTATE AND CALCIUM CHLORIDE 9 ML/HR: 600; 310; 30; 20 INJECTION, SOLUTION INTRAVENOUS at 11:06

## 2024-08-22 RX ADMIN — CEFAZOLIN 2 G: 1 INJECTION, POWDER, FOR SOLUTION INTRAMUSCULAR; INTRAVENOUS at 12:32

## 2024-08-22 RX ADMIN — DEXAMETHASONE SODIUM PHOSPHATE 4 MG: 4 INJECTION, SOLUTION INTRAMUSCULAR; INTRAVENOUS at 12:40

## 2024-08-22 RX ADMIN — MIDAZOLAM HYDROCHLORIDE 2 MG: 1 INJECTION, SOLUTION INTRAMUSCULAR; INTRAVENOUS at 12:13

## 2024-08-22 RX ADMIN — OXYCODONE HYDROCHLORIDE 5 MG: 5 TABLET ORAL at 13:24

## 2024-08-22 RX ADMIN — LIDOCAINE HYDROCHLORIDE 100 MG: 20 INJECTION, SOLUTION INTRAVENOUS at 12:29

## 2024-08-22 RX ADMIN — HYDROMORPHONE HYDROCHLORIDE 0.5 MG: 1 INJECTION, SOLUTION INTRAMUSCULAR; INTRAVENOUS; SUBCUTANEOUS at 13:37

## 2024-08-22 RX ADMIN — OXYCODONE HYDROCHLORIDE 5 MG: 5 TABLET ORAL at 14:04

## 2024-08-22 RX ADMIN — PROPOFOL 180 MG: 10 INJECTION, EMULSION INTRAVENOUS at 12:31

## 2024-08-22 NOTE — ANESTHESIA PREPROCEDURE EVALUATION
Anesthesia Evaluation     Patient summary reviewed and Nursing notes reviewed   no history of anesthetic complications:   NPO Solid Status: > 8 hours  NPO Liquid Status: > 2 hours           Airway   Mallampati: I  TM distance: >3 FB  Neck ROM: full  Dental - normal exam     Pulmonary - negative pulmonary ROS and normal exam   Cardiovascular - negative cardio ROS and normal exam  Exercise tolerance: good (4-7 METS)    (+) hypertension      Neuro/Psych- negative ROS  (+) numbness  GI/Hepatic/Renal/Endo - negative ROS   (+) obesity, GERD    Musculoskeletal (-) negative ROS    Abdominal   (+) obese   Substance History - negative use     OB/GYN negative ob/gyn ROS         Other - negative ROS       ROS/Med Hx Other: PAT Nursing Notes unavailable.               Anesthesia Plan    ASA 2     general     intravenous induction     Anesthetic plan, risks, benefits, and alternatives have been provided, discussed and informed consent has been obtained with: patient.    Plan discussed with CRNA.    CODE STATUS:

## 2024-08-22 NOTE — TELEPHONE ENCOUNTER
Called pt and let her know that Dr. Darby will do surgery today and to remain NPO, and to make sure she has a  and pt verbalized understanding. Told her I have added her to the OR schedule, but that someone from the OR will call her once they have an arrival time, but that Dr. Darby stated that he wants to do it before noon and pt verbalized understanding.

## 2024-08-22 NOTE — H&P
[facility]   UROLOGY HISTORY AND PHYSICAL    Patient Name: Neha May  : 1985  MRN: [id number]  Primary Care Physician:  Rachael Waters APRN  Date of admission: 2024    Subjective   Subjective     Chief Complaint:   Ureteral stone    HPI:    Neha May is a 39 y.o. female ureteral stone    Ureteral stone      2024 CT abdomen/pelvis without - 4 mm proximal left ureteral stone, 3 mm obstructive stone left.  Right side has significant medullary nephrocalcinosis with a 5 mm stone lower pole.  Images reviewed  2024 UA - TNTC RBCs, no bacteria      Significant left flank pain.    No fevers    Never had lithotripsy before    No cardiopulmonary history    No anticoagulation      Review of Systems     10 systems reviewed and are negative other than what is listed in HPI    Personal History     Past Medical History:   Diagnosis Date    Colon polyp     Have had colonoscopy bc of family histoey - polyps seen - no sognoficant fondings    Foot pain, left     GERD (gastroesophageal reflux disease)     Heel pain     Hypertension     Take a low dose of kedication daily    Numbness in feet        Past Surgical History:   Procedure Laterality Date     SECTION      COLONOSCOPY      COLONOSCOPY N/A 5/3/2024    Procedure: COLONOSCOPY WITH COLD SNARE POLYPECTOMY;  Surgeon: Amie Mcclure MD;  Location: [facility] ENDOSCOPY;  Service: Gastroenterology;  Laterality: N/A;  COLON POLYP, DIVERTICULOSIS    ENDOSCOPY N/A 5/3/2024    Procedure: ESOPHAGOGASTRODUODENOSCOPY WITH BIOPSIES AND 15-18MM BALLOON DILATATION;  Surgeon: Amie Mcclure MD;  Location: [facility] ENDOSCOPY;  Service: Gastroenterology;  Laterality: N/A;  SCHATZKI'S RING, HIATAL HERNIA    TUBAL ABDOMINAL LIGATION      UPPER GASTROINTESTINAL ENDOSCOPY         Family History: family history includes Colon cancer in her maternal grandmother; Diabetes in her mother; Hypertension in her brother and  mother. Otherwise pertinent FHx was reviewed and not pertinent to current issue.    Social History:  reports that she has never smoked. She has been exposed to tobacco smoke. She has never used smokeless tobacco. She reports that she does not drink alcohol and does not use drugs.    Home Medications:  Tirzepatide-Weight Management, ketorolac, lisinopril, ondansetron ODT, oxyCODONE-acetaminophen, pantoprazole, tamsulosin, and vitamin D      Allergies:  No Known Allergies    Objective   Objective     Vitals:   Temp:  [98.4 °F (36.9 °C)] 98.4 °F (36.9 °C)  Heart Rate:  [61] 61  Resp:  [20] 20  BP: (126)/(61) 126/61  Physical Exam    Constitutional: Awake, alert    Respiratory: Clear to auscultation bilaterally, nonlabored respirations    Cardiovascular: RRR, no murmurs, rubs, or gallops, palpable pedal pulses bilaterally   Gastrointestinal: Positive bowel sounds, soft, nontender, nondistended   Musculoskeletal: No bilateral ankle edema, no clubbing or cyanosis to extremities       Result Review    Result Review:  I have personally reviewed the results from the time of this admission to 8/22/2024 11:29 EDT and agree with these findings:  []  Laboratory  []  Microbiology  []  Radiology  []  EKG/Telemetry   []  Cardiology/Vascular   []  Pathology  []  Old records  []  Other:    Assessment & Plan   Assessment / Plan     Brief Patient Summary:  Neha May is a 39 y.o. female     Active Hospital Problems:  Active Hospital Problems    Diagnosis     **Left ureteral stone        Cystoscopy with left ureteroscopy with laser and left ureteral stent placement.  Risks and benefits were discussed including bleeding, infection and damage to the urinary system.  We also discussed the risk of anesthesia up to and including death.  Patient voiced understanding and would like to proceed.    Electronically signed by Jose Darby MD, 08/22/24, 11:29 AM EDT.

## 2024-08-22 NOTE — TELEPHONE ENCOUNTER
Please add to Dr. Darby schedule and notify patient. He wanted her to come soon- he had a cancellation.

## 2024-08-22 NOTE — TELEPHONE ENCOUNTER
PATIENT CALLED STATING SHE IS READY TO SCHEDULE SURGERY BECAUSE SHE IS IN A LOT OF PAIN. SHE STATED SHE COULD HAVE IT TODAY SINCE SHE HASN'T HAD ANYTHING TO EAT OR DRINK YET. SHE SAW YOU YESTERDAY.

## 2024-08-22 NOTE — OP NOTE
URETEROSCOPY LASER LITHOTRIPSY WITH STENT INSERTION  Procedure Report    Patient Name:  Neha May  YOB: 1985    Date of Surgery:  8/22/2024      Pre-op Diagnosis:   Left ureteral stone [N20.1]       Postop diagnosis:    Same    Procedure/CPT® Codes:      Procedure(s):    Cystoscopy  LEFT URETEROSCOPY LASER LITHOTRIPSY BASKET STONE EXTRACTION  LEFT URETERAL STENT INSERTION 6 x 22 with string left on      Staff:  Surgeon(s):  Jose Darby MD         Anesthesia: Choice    Estimated Blood Loss: minimal    Implants:    Implant Name Type Inv. Item Serial No.  Lot No. LRB No. Used Action   STNT URETRL ASCERTA 6F 22CM - [id number] Stent STNT URETRL ASCERTA 6F 22CM  [id number] Left 1 Implanted       Specimen:          Specimens       ID Source Type Tests Collected By Collected At Frozen?    A Ureter, Left Calculus TISSUE PATHOLOGY EXAM  STONE ANALYSIS   Jose Darby MD 8/22/24 1305 No    Description: left ureteral stones                Findings:     Normal bladder  All stones removed from the left    Stent placed with string    Complications: none    Description of Procedure:   After informed consent patient taken to the operating room.  Patient was laid supine and placed under general anesthesia by the anesthesia team.  At this point patient was placed in dorsal lithotomy position and prepped and draped in normal sterile fashion.  A multidisciplinary timeout was undertaken documenting the correct patient site and procedure.  At this point a 22 rigid cystoscope was placed into the urethra . Bladder was normal.  At this point a Glidewire was placed up the left         ureter without any issue under fluoroscopic guidance.  I then placed a dual-lumen catheter and a stiff wire alongside the Glidewire under fluoroscopic guidance.  The dual-lumen was removed and a ureteral access sheath was placed into the distal ureter without any problem.  I removed the  obturator and wire and placed a flexible ureteroscope up the ueter.  The stone was identified.  Stone was lasered into multiple small fragments with a 272 µm laser fiber and then these pieces were basketed out with a no tip nitinol basket.  I then took the flexible ureteroscope up and check the rest of the ureter and the upper collecting system.  There were no further stones.  Brought the actual sheath out under direct vision and there was no further stones.    Left side was free of stones.  A 6 x 22 ureteral stent was then placed over the Glidewire through a rigid cystoscope without issue and had a good curl in the bladder under direct vision and a good curl in the left     renal pelvis under fluoroscopy.  Bladder was drained.  Patient tolerated the procedure well, he was taken to the postanesthesia care unit without issue.      String left on stent      Jose Darby MD     Date: 8/22/2024  Time: 13:10 EDT

## 2024-08-22 NOTE — DISCHARGE INSTRUCTIONS
DISCHARGE INSTRUCTIONS  Ureteroscopy Lasertripsy      For your surgery you had:  General anesthesia (you may have a sore throat for the first 24 hours)     You may experience dizziness, drowsiness, or lightheadedness for several hours following surgery.  Do not stay alone today or tonight.  Limit your activity for 24 hours.  You should not drive or operate machinery, drink alcohol, or sign legally binding documents for 24 hours or while you are taking pain medication.  Resume your diet slowly.  Follow any special dietary instructions you may have been given by your doctor.     NOTIFY YOUR DOCTOR IF YOU EXPERIENCE ANY OF THE FOLLOWING:  Temperature greater than 101 degrees Fahrenheit  Shaking Chills  Redness or excessive drainage from incision  Nausea, vomiting and/or pain that is not controlled by prescribed medications  Increase in bleeding or bleeding that is excessive  Unable to urinate in 6 hours after surgery  If unable to reach your doctor, please go to the closest Emergency Room  Strain urine if instructed by physician.  Collect any fragments and take with you on your scheduled appointment. You may pass stone pieces or small blood clots.  Blood in your urine is normal.  It could be light pink to cherry color.  Drink 6-8 glasses of fluid each day to assist with passing of stone fragments.  Back pain is common.  It may feel like a dull ache or back spasm.  Urine will be bloody for several days.  Slight redness or bruising may be noticed on treated side.  If you have difficulty urinating, try sitting in a bathtub of warm water.    If you have a stent, it must be managed by your urologist.  Do NOT forget.  Medications per physician instructions as indicated on Discharge Medication Information Sheet.    Last dose of pain medication was given at:   .    SPECIAL INSTRUCTIONS:

## 2024-08-22 NOTE — ANESTHESIA POSTPROCEDURE EVALUATION
Patient: Neha May    Procedure Summary       Date: 08/22/24 Room / Location: [facility] OR 02 / [facility] MAIN OR    Anesthesia Start: 1222 Anesthesia Stop: 1317    Procedure: LEFT URETEROSCOPY LASER LITHOTRIPSY WITH STENT INSERTION (Left) Diagnosis:       Left ureteral stone      (Left ureteral stone [N20.1])    Surgeons: Jose Darby MD Provider: Star Reaves MD    Anesthesia Type: general ASA Status: 2            Anesthesia Type: general    Vitals  Vitals Value Taken Time   /61 08/22/24 1337   Temp 36.1 °C (97 °F) 08/22/24 1315   Pulse 64 08/22/24 1341   Resp 16 08/22/24 1335   SpO2 95 % 08/22/24 1341   Vitals shown include unfiled device data.        Post Anesthesia Care and Evaluation    Patient location during evaluation: bedside  Patient participation: complete - patient participated  Level of consciousness: awake  Pain management: adequate    Airway patency: patent  PONV Status: none  Cardiovascular status: acceptable and stable  Respiratory status: acceptable  Hydration status: acceptable

## 2024-08-23 ENCOUNTER — TELEPHONE (OUTPATIENT)
Dept: UROLOGY | Facility: CLINIC | Age: 39
End: 2024-08-23
Payer: COMMERCIAL

## 2024-08-23 ENCOUNTER — TELEPHONE (OUTPATIENT)
Dept: FAMILY MEDICINE CLINIC | Facility: CLINIC | Age: 39
End: 2024-08-23
Payer: COMMERCIAL

## 2024-08-23 LAB
LAB AP CASE REPORT: NORMAL
LAB AP CLINICAL INFORMATION: NORMAL
PATH REPORT.FINAL DX SPEC: NORMAL
PATH REPORT.GROSS SPEC: NORMAL

## 2024-08-23 NOTE — TELEPHONE ENCOUNTER
Caller: Neha May    Relationship: Self    Best call back number:     [phone]       What medication are you requesting: linaclotide (Linzess) 145 MCG capsule capsule     What are your current symptoms: CONSTIPATION       If a prescription is needed, what is your preferred pharmacy and phone number:  [place], [place] - [address] [facility] [phone] [facility] [phone] FX     Additional notes:

## 2024-08-23 NOTE — TELEPHONE ENCOUNTER
Called pt. Advised her that the pain she is experiencing is likely due to inflammation from not only the procedure performed and the stent in place. She should increase fluids to ensure her system stays flushed out and also take medication to alleviate pain as needed. She was instructed to go to ER with severe intolerable pain/fever/chills. She will call us back with further questions or concerns.

## 2024-08-23 NOTE — TELEPHONE ENCOUNTER
PT CALLED BACK, SHE STATES THAT THE PAIN IS WORSE THAN WHAT IT WAS, SHE THINKS SOMETHING IS WRONG WITH THE STENT. STATES THAT SHE IS GOING TO GO AHEAD AND TAKE ANOTHER PAIN PILL AND SEE IF IT HELPS.

## 2024-08-23 NOTE — TELEPHONE ENCOUNTER
PATIENT CALLED AND SAID SHE HAD SURGERY YESTERDAY.  SHE SAID HER SIDE WHERE THE STONE WAS IS HURTING MORE THAT IT WAS YESTERDAY.  SHE HAS A STENT.  SHE CAN URINATE, BUT SHE SAID SHE CAN HARDLY TAKE THE PAIN WHEN SHE DOES, AND THE HYDROCODONE ARE NOT TOUCHING IT.    #[phone]

## 2024-08-26 ENCOUNTER — TELEPHONE (OUTPATIENT)
Dept: UROLOGY | Facility: CLINIC | Age: 39
End: 2024-08-26
Payer: COMMERCIAL

## 2024-08-26 ENCOUNTER — HOSPITAL ENCOUNTER (INPATIENT)
Facility: HOSPITAL | Age: 39
LOS: 3 days | Discharge: HOME | DRG: 871 | End: 2024-08-30
Attending: EMERGENCY MEDICINE | Admitting: STUDENT IN AN ORGANIZED HEALTH CARE EDUCATION/TRAINING PROGRAM
Payer: COMMERCIAL

## 2024-08-26 DIAGNOSIS — N20.0 NEPHROLITHIASIS: Primary | ICD-10-CM

## 2024-08-26 DIAGNOSIS — R26.2 DIFFICULTY WALKING: ICD-10-CM

## 2024-08-26 DIAGNOSIS — N39.0 ACUTE URINARY TRACT INFECTION: ICD-10-CM

## 2024-08-26 DIAGNOSIS — N12 PYELITIS: ICD-10-CM

## 2024-08-26 DIAGNOSIS — R50.9 FEVER IN ADULT: Primary | ICD-10-CM

## 2024-08-26 DIAGNOSIS — N13.30 HYDRONEPHROSIS OF LEFT KIDNEY: ICD-10-CM

## 2024-08-26 DIAGNOSIS — N20.1 LEFT URETERAL STONE: ICD-10-CM

## 2024-08-26 DIAGNOSIS — U07.1 COVID: ICD-10-CM

## 2024-08-26 LAB
ALBUMIN SERPL-MCNC: 3.4 G/DL (ref 3.5–5.2)
ALBUMIN/GLOB SERPL: 1.1 G/DL
ALP SERPL-CCNC: 62 U/L (ref 39–117)
ALT SERPL W P-5'-P-CCNC: 9 U/L (ref 1–33)
ANION GAP SERPL CALCULATED.3IONS-SCNC: 10.5 MMOL/L (ref 5–15)
AST SERPL-CCNC: 13 U/L (ref 1–32)
BASOPHILS # BLD AUTO: 0.03 10*3/MM3 (ref 0–0.2)
BASOPHILS NFR BLD AUTO: 0.3 % (ref 0–1.5)
BILIRUB SERPL-MCNC: 0.3 MG/DL (ref 0–1.2)
BUN SERPL-MCNC: 8 MG/DL (ref 6–20)
BUN/CREAT SERPL: 10.8 (ref 7–25)
CALCIUM SPEC-SCNC: 8.7 MG/DL (ref 8.6–10.5)
CHLORIDE SERPL-SCNC: 101 MMOL/L (ref 98–107)
CO2 SERPL-SCNC: 22.5 MMOL/L (ref 22–29)
CREAT SERPL-MCNC: 0.74 MG/DL (ref 0.57–1)
D-LACTATE SERPL-SCNC: 0.9 MMOL/L (ref 0.5–2)
DEPRECATED RDW RBC AUTO: 39.9 FL (ref 37–54)
EGFRCR SERPLBLD CKD-EPI 2021: 105 ML/MIN/1.73
EOSINOPHIL # BLD AUTO: 0.1 10*3/MM3 (ref 0–0.4)
EOSINOPHIL NFR BLD AUTO: 1.1 % (ref 0.3–6.2)
ERYTHROCYTE [DISTWIDTH] IN BLOOD BY AUTOMATED COUNT: 12.1 % (ref 12.3–15.4)
GLOBULIN UR ELPH-MCNC: 3.2 GM/DL
GLUCOSE SERPL-MCNC: 96 MG/DL (ref 65–99)
HCT VFR BLD AUTO: 37.3 % (ref 34–46.6)
HGB BLD-MCNC: 12.2 G/DL (ref 12–15.9)
HOLD SPECIMEN: NORMAL
HOLD SPECIMEN: NORMAL
IMM GRANULOCYTES # BLD AUTO: 0.03 10*3/MM3 (ref 0–0.05)
IMM GRANULOCYTES NFR BLD AUTO: 0.3 % (ref 0–0.5)
LYMPHOCYTES # BLD AUTO: 0.8 10*3/MM3 (ref 0.7–3.1)
LYMPHOCYTES NFR BLD AUTO: 8.9 % (ref 19.6–45.3)
MCH RBC QN AUTO: 29.5 PG (ref 26.6–33)
MCHC RBC AUTO-ENTMCNC: 32.7 G/DL (ref 31.5–35.7)
MCV RBC AUTO: 90.3 FL (ref 79–97)
MONOCYTES # BLD AUTO: 0.62 10*3/MM3 (ref 0.1–0.9)
MONOCYTES NFR BLD AUTO: 6.9 % (ref 5–12)
NEUTROPHILS NFR BLD AUTO: 7.41 10*3/MM3 (ref 1.7–7)
NEUTROPHILS NFR BLD AUTO: 82.5 % (ref 42.7–76)
NRBC BLD AUTO-RTO: 0 /100 WBC (ref 0–0.2)
PLATELET # BLD AUTO: 239 10*3/MM3 (ref 140–450)
PMV BLD AUTO: 10.2 FL (ref 6–12)
POTASSIUM SERPL-SCNC: 3.8 MMOL/L (ref 3.5–5.2)
PROT SERPL-MCNC: 6.6 G/DL (ref 6–8.5)
RBC # BLD AUTO: 4.13 10*6/MM3 (ref 3.77–5.28)
SODIUM SERPL-SCNC: 134 MMOL/L (ref 136–145)
WBC NRBC COR # BLD AUTO: 8.99 10*3/MM3 (ref 3.4–10.8)
WHOLE BLOOD HOLD COAG: NORMAL
WHOLE BLOOD HOLD SPECIMEN: NORMAL

## 2024-08-26 PROCEDURE — 85025 COMPLETE CBC W/AUTO DIFF WBC: CPT

## 2024-08-26 PROCEDURE — 99285 EMERGENCY DEPT VISIT HI MDM: CPT

## 2024-08-26 PROCEDURE — 83605 ASSAY OF LACTIC ACID: CPT

## 2024-08-26 PROCEDURE — 87040 BLOOD CULTURE FOR BACTERIA: CPT | Performed by: EMERGENCY MEDICINE

## 2024-08-26 PROCEDURE — 80053 COMPREHEN METABOLIC PANEL: CPT

## 2024-08-26 PROCEDURE — 36415 COLL VENOUS BLD VENIPUNCTURE: CPT

## 2024-08-26 PROCEDURE — 87637 SARSCOV2&INF A&B&RSV AMP PRB: CPT

## 2024-08-26 PROCEDURE — 87040 BLOOD CULTURE FOR BACTERIA: CPT

## 2024-08-26 RX ORDER — HYDROCODONE BITARTRATE AND ACETAMINOPHEN 5; 325 MG/1; MG/1
1 TABLET ORAL EVERY 6 HOURS PRN
Qty: 12 TABLET | Refills: 0 | Status: ON HOLD | OUTPATIENT
Start: 2024-08-26 | End: 2024-08-27

## 2024-08-26 RX ORDER — SODIUM CHLORIDE 0.9 % (FLUSH) 0.9 %
10 SYRINGE (ML) INJECTION AS NEEDED
Status: DISCONTINUED | OUTPATIENT
Start: 2024-08-26 | End: 2024-08-30 | Stop reason: HOSPADM

## 2024-08-26 RX ORDER — ACETAMINOPHEN 500 MG
1000 TABLET ORAL ONCE
Status: COMPLETED | OUTPATIENT
Start: 2024-08-26 | End: 2024-08-26

## 2024-08-26 RX ADMIN — ACETAMINOPHEN 1000 MG: 500 TABLET ORAL at 23:24

## 2024-08-26 NOTE — TELEPHONE ENCOUNTER
PT PULLED HER STENT LAST NIGHT AROUND 10PM. AT 3AM SHE WAS IN EXCRUCIATING PAIN AND TOOK HER LAST PAIN PILL. WANTS TO KNOW IF IT'S NORMAL TO HAVE THAT MUCH PAIN AFTER STENT REMOVAL? CAN SHE GET A REFILL ON PAIN MEDS IN CASE IT HAPPENS AGAIN? SHE STATES THAT RIGHT NOW THE PAIN IS BEARABLE BUT SHE IS AFRAID THAT WITHOUT SOMETHING FOR PAIN IT WILL BE TOO MUCH AND SHE WILL HAVE TO GO BACK TO THE ER.

## 2024-08-27 ENCOUNTER — APPOINTMENT (OUTPATIENT)
Dept: CT IMAGING | Facility: HOSPITAL | Age: 39
DRG: 871 | End: 2024-08-27
Payer: COMMERCIAL

## 2024-08-27 PROBLEM — N13.30 HYDRONEPHROSIS OF LEFT KIDNEY: Status: ACTIVE | Noted: 2024-08-27

## 2024-08-27 LAB
BACTERIA UR QL AUTO: ABNORMAL /HPF
BILIRUB UR QL STRIP: NEGATIVE
CLARITY UR: ABNORMAL
COLOR UR: YELLOW
FLUAV SUBTYP SPEC NAA+PROBE: NOT DETECTED
FLUBV RNA ISLT QL NAA+PROBE: NOT DETECTED
GLUCOSE BLDC GLUCOMTR-MCNC: 117 MG/DL (ref 70–99)
GLUCOSE UR STRIP-MCNC: NEGATIVE MG/DL
HGB UR QL STRIP.AUTO: ABNORMAL
HYALINE CASTS UR QL AUTO: ABNORMAL /LPF
KETONES UR QL STRIP: ABNORMAL
LEUKOCYTE ESTERASE UR QL STRIP.AUTO: ABNORMAL
NITRITE UR QL STRIP: NEGATIVE
PH UR STRIP.AUTO: 6.5 [PH] (ref 5–8)
PROT UR QL STRIP: ABNORMAL
RBC # UR STRIP: ABNORMAL /HPF
REF LAB TEST METHOD: ABNORMAL
RSV RNA NPH QL NAA+NON-PROBE: NOT DETECTED
SARS-COV-2 RNA RESP QL NAA+PROBE: DETECTED
SP GR UR STRIP: 1.01 (ref 1–1.03)
SQUAMOUS #/AREA URNS HPF: ABNORMAL /HPF
UROBILINOGEN UR QL STRIP: ABNORMAL
WBC # UR STRIP: ABNORMAL /HPF

## 2024-08-27 PROCEDURE — 25010000002 KETOROLAC TROMETHAMINE PER 15 MG: Performed by: EMERGENCY MEDICINE

## 2024-08-27 PROCEDURE — 25510000001 IOPAMIDOL PER 1 ML: Performed by: EMERGENCY MEDICINE

## 2024-08-27 PROCEDURE — 25010000002 ONDANSETRON PER 1 MG: Performed by: EMERGENCY MEDICINE

## 2024-08-27 PROCEDURE — 87186 SC STD MICRODIL/AGAR DIL: CPT | Performed by: STUDENT IN AN ORGANIZED HEALTH CARE EDUCATION/TRAINING PROGRAM

## 2024-08-27 PROCEDURE — 25010000002 CEFTRIAXONE PER 250 MG: Performed by: STUDENT IN AN ORGANIZED HEALTH CARE EDUCATION/TRAINING PROGRAM

## 2024-08-27 PROCEDURE — 25010000002 CEFTRIAXONE PER 250 MG: Performed by: EMERGENCY MEDICINE

## 2024-08-27 PROCEDURE — 82948 REAGENT STRIP/BLOOD GLUCOSE: CPT

## 2024-08-27 PROCEDURE — 87077 CULTURE AEROBIC IDENTIFY: CPT | Performed by: STUDENT IN AN ORGANIZED HEALTH CARE EDUCATION/TRAINING PROGRAM

## 2024-08-27 PROCEDURE — 99223 1ST HOSP IP/OBS HIGH 75: CPT | Performed by: STUDENT IN AN ORGANIZED HEALTH CARE EDUCATION/TRAINING PROGRAM

## 2024-08-27 PROCEDURE — 25010000002 HYDROMORPHONE 1 MG/ML SOLUTION: Performed by: STUDENT IN AN ORGANIZED HEALTH CARE EDUCATION/TRAINING PROGRAM

## 2024-08-27 PROCEDURE — 25810000003 LACTATED RINGERS PER 1000 ML: Performed by: STUDENT IN AN ORGANIZED HEALTH CARE EDUCATION/TRAINING PROGRAM

## 2024-08-27 PROCEDURE — 25010000002 KETOROLAC TROMETHAMINE PER 15 MG: Performed by: STUDENT IN AN ORGANIZED HEALTH CARE EDUCATION/TRAINING PROGRAM

## 2024-08-27 PROCEDURE — 87086 URINE CULTURE/COLONY COUNT: CPT | Performed by: STUDENT IN AN ORGANIZED HEALTH CARE EDUCATION/TRAINING PROGRAM

## 2024-08-27 PROCEDURE — 25010000002 HYDROMORPHONE 1 MG/ML SOLUTION: Performed by: EMERGENCY MEDICINE

## 2024-08-27 PROCEDURE — 99221 1ST HOSP IP/OBS SF/LOW 40: CPT | Performed by: UROLOGY

## 2024-08-27 PROCEDURE — 74177 CT ABD & PELVIS W/CONTRAST: CPT

## 2024-08-27 PROCEDURE — 81001 URINALYSIS AUTO W/SCOPE: CPT | Performed by: EMERGENCY MEDICINE

## 2024-08-27 RX ORDER — KETOROLAC TROMETHAMINE 15 MG/ML
15 INJECTION, SOLUTION INTRAMUSCULAR; INTRAVENOUS EVERY 6 HOURS PRN
Status: DISCONTINUED | OUTPATIENT
Start: 2024-08-27 | End: 2024-08-30 | Stop reason: HOSPADM

## 2024-08-27 RX ORDER — ACETAMINOPHEN 325 MG/1
650 TABLET ORAL EVERY 4 HOURS PRN
Status: DISCONTINUED | OUTPATIENT
Start: 2024-08-27 | End: 2024-08-30 | Stop reason: HOSPADM

## 2024-08-27 RX ORDER — IBUPROFEN 400 MG/1
400 TABLET, FILM COATED ORAL EVERY 4 HOURS PRN
Status: DISCONTINUED | OUTPATIENT
Start: 2024-08-27 | End: 2024-08-30 | Stop reason: HOSPADM

## 2024-08-27 RX ORDER — SODIUM CHLORIDE 0.9 % (FLUSH) 0.9 %
10 SYRINGE (ML) INJECTION AS NEEDED
Status: DISCONTINUED | OUTPATIENT
Start: 2024-08-27 | End: 2024-08-30 | Stop reason: HOSPADM

## 2024-08-27 RX ORDER — SODIUM CHLORIDE 0.9 % (FLUSH) 0.9 %
10 SYRINGE (ML) INJECTION EVERY 12 HOURS SCHEDULED
Status: DISCONTINUED | OUTPATIENT
Start: 2024-08-27 | End: 2024-08-30 | Stop reason: HOSPADM

## 2024-08-27 RX ORDER — BISACODYL 5 MG/1
5 TABLET, DELAYED RELEASE ORAL DAILY PRN
Status: DISCONTINUED | OUTPATIENT
Start: 2024-08-27 | End: 2024-08-30 | Stop reason: HOSPADM

## 2024-08-27 RX ORDER — HYDROCODONE BITARTRATE AND ACETAMINOPHEN 10; 325 MG/1; MG/1
1 TABLET ORAL EVERY 4 HOURS PRN
Status: DISCONTINUED | OUTPATIENT
Start: 2024-08-27 | End: 2024-08-30 | Stop reason: HOSPADM

## 2024-08-27 RX ORDER — IOPAMIDOL 755 MG/ML
100 INJECTION, SOLUTION INTRAVASCULAR
Status: COMPLETED | OUTPATIENT
Start: 2024-08-27 | End: 2024-08-27

## 2024-08-27 RX ORDER — DEXTROSE MONOHYDRATE 25 G/50ML
25 INJECTION, SOLUTION INTRAVENOUS
Status: DISCONTINUED | OUTPATIENT
Start: 2024-08-27 | End: 2024-08-27

## 2024-08-27 RX ORDER — POLYETHYLENE GLYCOL 3350 17 G/17G
17 POWDER, FOR SOLUTION ORAL DAILY PRN
Status: DISCONTINUED | OUTPATIENT
Start: 2024-08-27 | End: 2024-08-30 | Stop reason: HOSPADM

## 2024-08-27 RX ORDER — ACETAMINOPHEN 325 MG/1
975 TABLET ORAL ONCE
Status: COMPLETED | OUTPATIENT
Start: 2024-08-27 | End: 2024-08-27

## 2024-08-27 RX ORDER — ONDANSETRON 2 MG/ML
4 INJECTION INTRAMUSCULAR; INTRAVENOUS EVERY 6 HOURS PRN
Status: DISCONTINUED | OUTPATIENT
Start: 2024-08-27 | End: 2024-08-30 | Stop reason: HOSPADM

## 2024-08-27 RX ORDER — AMOXICILLIN 250 MG
2 CAPSULE ORAL 2 TIMES DAILY PRN
Status: DISCONTINUED | OUTPATIENT
Start: 2024-08-27 | End: 2024-08-30 | Stop reason: HOSPADM

## 2024-08-27 RX ORDER — SODIUM CHLORIDE, SODIUM LACTATE, POTASSIUM CHLORIDE, CALCIUM CHLORIDE 600; 310; 30; 20 MG/100ML; MG/100ML; MG/100ML; MG/100ML
125 INJECTION, SOLUTION INTRAVENOUS CONTINUOUS
Status: ACTIVE | OUTPATIENT
Start: 2024-08-27 | End: 2024-08-27

## 2024-08-27 RX ORDER — IBUPROFEN 600 MG/1
1 TABLET ORAL
Status: DISCONTINUED | OUTPATIENT
Start: 2024-08-27 | End: 2024-08-27

## 2024-08-27 RX ORDER — SODIUM CHLORIDE 9 MG/ML
40 INJECTION, SOLUTION INTRAVENOUS AS NEEDED
Status: DISCONTINUED | OUTPATIENT
Start: 2024-08-27 | End: 2024-08-30 | Stop reason: HOSPADM

## 2024-08-27 RX ORDER — KETOROLAC TROMETHAMINE 30 MG/ML
30 INJECTION, SOLUTION INTRAMUSCULAR; INTRAVENOUS ONCE
Status: COMPLETED | OUTPATIENT
Start: 2024-08-27 | End: 2024-08-27

## 2024-08-27 RX ORDER — BISACODYL 10 MG
10 SUPPOSITORY, RECTAL RECTAL DAILY PRN
Status: DISCONTINUED | OUTPATIENT
Start: 2024-08-27 | End: 2024-08-30 | Stop reason: HOSPADM

## 2024-08-27 RX ORDER — NALOXONE HCL 0.4 MG/ML
0.4 VIAL (ML) INJECTION
Status: DISCONTINUED | OUTPATIENT
Start: 2024-08-27 | End: 2024-08-30 | Stop reason: HOSPADM

## 2024-08-27 RX ORDER — ONDANSETRON 2 MG/ML
4 INJECTION INTRAMUSCULAR; INTRAVENOUS ONCE
Status: COMPLETED | OUTPATIENT
Start: 2024-08-27 | End: 2024-08-27

## 2024-08-27 RX ORDER — TAMSULOSIN HYDROCHLORIDE 0.4 MG/1
0.4 CAPSULE ORAL DAILY
Status: DISCONTINUED | OUTPATIENT
Start: 2024-08-27 | End: 2024-08-30 | Stop reason: HOSPADM

## 2024-08-27 RX ORDER — HYDROCODONE BITARTRATE AND ACETAMINOPHEN 5; 325 MG/1; MG/1
1 TABLET ORAL EVERY 4 HOURS PRN
Status: DISCONTINUED | OUTPATIENT
Start: 2024-08-27 | End: 2024-08-30 | Stop reason: HOSPADM

## 2024-08-27 RX ORDER — NICOTINE POLACRILEX 4 MG
15 LOZENGE BUCCAL
Status: DISCONTINUED | OUTPATIENT
Start: 2024-08-27 | End: 2024-08-27

## 2024-08-27 RX ADMIN — TAMSULOSIN HYDROCHLORIDE 0.4 MG: 0.4 CAPSULE ORAL at 05:05

## 2024-08-27 RX ADMIN — IBUPROFEN 400 MG: 400 TABLET ORAL at 21:08

## 2024-08-27 RX ADMIN — KETOROLAC TROMETHAMINE 15 MG: 15 INJECTION, SOLUTION INTRAMUSCULAR; INTRAVENOUS at 09:14

## 2024-08-27 RX ADMIN — IOPAMIDOL 100 ML: 755 INJECTION, SOLUTION INTRAVENOUS at 00:23

## 2024-08-27 RX ADMIN — KETOROLAC TROMETHAMINE 30 MG: 30 INJECTION, SOLUTION INTRAMUSCULAR; INTRAVENOUS at 02:43

## 2024-08-27 RX ADMIN — HYDROCODONE BITARTRATE AND ACETAMINOPHEN 1 TABLET: 10; 325 TABLET ORAL at 14:18

## 2024-08-27 RX ADMIN — CEFTRIAXONE SODIUM 2000 MG: 2 INJECTION, POWDER, FOR SOLUTION INTRAMUSCULAR; INTRAVENOUS at 22:47

## 2024-08-27 RX ADMIN — CEFTRIAXONE SODIUM 2000 MG: 2 INJECTION, POWDER, FOR SOLUTION INTRAMUSCULAR; INTRAVENOUS at 01:30

## 2024-08-27 RX ADMIN — Medication 10 ML: at 20:45

## 2024-08-27 RX ADMIN — IBUPROFEN 400 MG: 400 TABLET ORAL at 15:43

## 2024-08-27 RX ADMIN — SODIUM CHLORIDE, POTASSIUM CHLORIDE, SODIUM LACTATE AND CALCIUM CHLORIDE 125 ML/HR: 600; 310; 30; 20 INJECTION, SOLUTION INTRAVENOUS at 04:11

## 2024-08-27 RX ADMIN — HYDROMORPHONE HYDROCHLORIDE 0.5 MG: 1 INJECTION, SOLUTION INTRAMUSCULAR; INTRAVENOUS; SUBCUTANEOUS at 12:07

## 2024-08-27 RX ADMIN — HYDROMORPHONE HYDROCHLORIDE 1 MG: 1 INJECTION, SOLUTION INTRAMUSCULAR; INTRAVENOUS; SUBCUTANEOUS at 01:27

## 2024-08-27 RX ADMIN — KETOROLAC TROMETHAMINE 15 MG: 15 INJECTION, SOLUTION INTRAMUSCULAR; INTRAVENOUS at 20:44

## 2024-08-27 RX ADMIN — ACETAMINOPHEN 975 MG: 325 TABLET ORAL at 06:51

## 2024-08-27 RX ADMIN — ONDANSETRON 4 MG: 2 INJECTION INTRAMUSCULAR; INTRAVENOUS at 01:25

## 2024-08-27 RX ADMIN — HYDROMORPHONE HYDROCHLORIDE 0.5 MG: 1 INJECTION, SOLUTION INTRAMUSCULAR; INTRAVENOUS; SUBCUTANEOUS at 05:07

## 2024-08-27 RX ADMIN — HYDROCODONE BITARTRATE AND ACETAMINOPHEN 1 TABLET: 5; 325 TABLET ORAL at 22:44

## 2024-08-27 NOTE — CONSULTS
[facility]   UROLOGY Consult Note    Patient Name: Neha May  : 1985  MRN: [id number]  Primary Care Physician:  Rachael Waters APRN  Referring Physician: No ref. provider found  Date of admission: 2024    Subjective   Subjective     Chief Complaint:     Fevers    HPI:  Neha May is a 39 y.o. female     Left flank pain  Fever  UTI  Possible urinary sepsis  COVID      2024 ED - temperature up to 102,    2024 CT abdomen/pelvis with - no ureteral stent, mild to moderate left hydro down with transition zone at about the UPJ.  No stones.  2024 UA - 6-10 RBCs, 2+ bacteria, nitrite negative  2024 COVID +  24   0.7,     Stent removed at home    2024 left ureteroscopy - stent with string -all stones removed in the left        2024 CT abdomen/pelvis without - 4 mm proximal left ureteral stone, 3 mm obstructive stone left.  Right side has significant medullary nephrocalcinosis with a 5 mm stone lower pole.  Images reviewed  2024 UA - TNTC RBCs, no bacteria    Review of Systems     10 systems reviewed and are negative other than what is listed in the HPI    Personal History     Past Medical History:   Diagnosis Date    Colon polyp     Have had colonoscopy bc of family histoey - polyps seen - no sognoficant fondings    Foot pain, left     GERD (gastroesophageal reflux disease)     Heel pain     Hypertension     Take a low dose of kedication daily    Numbness in feet        Past Surgical History:   Procedure Laterality Date     SECTION      COLONOSCOPY      COLONOSCOPY N/A 5/3/2024    Procedure: COLONOSCOPY WITH COLD SNARE POLYPECTOMY;  Surgeon: Amie Mcclure MD;  Location: [facility] ENDOSCOPY;  Service: Gastroenterology;  Laterality: N/A;  COLON POLYP, DIVERTICULOSIS    ENDOSCOPY N/A 5/3/2024    Procedure: ESOPHAGOGASTRODUODENOSCOPY WITH BIOPSIES AND 15-18MM BALLOON DILATATION;  Surgeon: Amie Mcclure MD;   Location: [facility] ENDOSCOPY;  Service: Gastroenterology;  Laterality: N/A;  SCHATZKI'S RING, HIATAL HERNIA    TUBAL ABDOMINAL LIGATION  2008    UPPER GASTROINTESTINAL ENDOSCOPY      URETEROSCOPY LASER LITHOTRIPSY WITH STENT INSERTION Left 8/22/2024    Procedure: LEFT URETEROSCOPY LASER LITHOTRIPSY WITH STENT INSERTION;  Surgeon: Jose Darby MD;  Location: [facility] MAIN OR;  Service: Urology;  Laterality: Left;       Home Medications:  HYDROcodone-acetaminophen, Tirzepatide-Weight Management, ketorolac, lisinopril, ondansetron ODT, oxyCODONE-acetaminophen, pantoprazole, tamsulosin, and vitamin D    Allergies:  No Known Allergies    Objective    Objective     Vitals:   Temp:  [98.5 °F (36.9 °C)-102.1 °F (38.9 °C)] 98.5 °F (36.9 °C)  Heart Rate:  [] 110  Resp:  [14-20] 18  BP: (105-128)/(55-83) 114/55    Physical Exam:   Constitutional: Awake, alert    Respiratory: Clear to auscultation bilaterally, nonlabored respirations    Cardiovascular: RRR, no murmurs, rubs, or gallops, palpable pedal pulses bilaterally   Gastrointestinal: Positive bowel sounds, soft, nontender, nondistended       Result Review    Result Review:  I have personally reviewed the results from the time of this admission to 8/27/2024 09:53 EDT and agree with these findings:  []  Laboratory  []  Microbiology  []  Radiology  []  EKG/Telemetry   []  Cardiology/Vascular   []  Pathology  []  Old records  []  Other:      Assessment & Plan   Assessment / Plan     Brief Patient Summary:  Neha May is a 39 y.o. female     Active Hospital Problems:  Active Hospital Problems    Diagnosis     **Hydronephrosis of left kidney      CT images and read reviewed and discussed with the patient    Records reviewed      Left flank pain  Fever  UTI  Possible urinary sepsis  COVID      Patient with some minimal hydronephrosis on the left side which could easily still be postoperative from surgery 5 days ago.      Will continue conservative management at  this time    No indication for placement of stent at this time, I will continue to monitor    I will make n.p.o. after midnight in case she got acutely worse but do not think she will need stent replacement.    Can eat at this time    Appreciate hospitalist help with this patient      Electronically signed by Jose Darby MD, 08/27/24, 9:53 AM EDT.

## 2024-08-27 NOTE — PLAN OF CARE
Goal Outcome Evaluation:  Plan of Care Reviewed With: patient        Progress: no change  Outcome Evaluation: patient is still being medicated for pain, fever noted today and treated with tylenol and motrin, IV fluids infusing without difficulty, patient will be npo after midnight for possible procedure, Dr Darby with re evaluate in the am, all needs noted, will continue plan of care

## 2024-08-27 NOTE — H&P
[facility]  Date: 2024   Patient Name: Neha May  : 1985  MRN: [id number]  Primary Care Physician:  Rachael Waters APRN  Date of admission: 2024    Subjective   Subjective     Chief Complaint: Worsening left flank pain, fever    HPI:    Neha May is a 39 y.o. female  with past medical history of hypertension, GERD, recently diagnosed left ureteral stone at the UPJ status post laser lithotripsy basket stone extraction, left ureteral stent insertion on 2024 by Dr. Darby presented to the ED for evaluation of left flank pain.  Since the procedure patient noted to have left flank pain that did not improve much.  Patient has pulled out the ureteral stent as instructed around 10 PM on 2024 and yesterday morning after going to the work patient started noticing fevers and worsening left flank pain and has come to the ED for further evaluation.  Denies any chest pain, nausea, vomiting, diarrhea.    In the ED, vital signs temperature 102.1, pulse 93, respiratory 14, blood pressure 128/83 on room air saturating around 99%.  Labs showed sodium 134, rest of the CMP with no significant findings, normal lactic acid, normal CBC.  Urinalysis showed too numerous to count WBC, 2+ bacteria, negative nitrates.  COVID positive.  CT abdomen pelvis with contrast showed interval removal of the left ureteral stent since 2024.  Mild to moderate left-sided hydronephrosis is seen with suspected urinary transition zone at the UPJ.  A proximal left ureteral stricture is possible.  No obstructing ureteral calculus is appreciated.  Age-indeterminate infectious/inflammatory left-sided pyelitis/ureteritis.  Probable no left-sided pyelonephritis.  New tiny bilateral pleural effusions are noted.  He received ceftriaxone in the ED.  Case has been discussed by the ED physician with the urologist on-call, agreed to consult.  Patient has been admitted for  further evaluation and management of mild to moderate left-sided hydronephrosis, left-sided pyelitis/ureteritis      Personal History     Past Medical History:   Diagnosis Date   • Colon polyp     Have had colonoscopy bc of family histoey - polyps seen - no sognoficant fondings   • Foot pain, left    • GERD (gastroesophageal reflux disease)    • Heel pain    • Hypertension     Take a low dose of kedication daily   • Numbness in feet          Past Surgical History:   Procedure Laterality Date   •  SECTION     • COLONOSCOPY     • COLONOSCOPY N/A 5/3/2024    Procedure: COLONOSCOPY WITH COLD SNARE POLYPECTOMY;  Surgeon: Amie Mcclure MD;  Location: [facility] ENDOSCOPY;  Service: Gastroenterology;  Laterality: N/A;  COLON POLYP, DIVERTICULOSIS   • ENDOSCOPY N/A 5/3/2024    Procedure: ESOPHAGOGASTRODUODENOSCOPY WITH BIOPSIES AND 15-18MM BALLOON DILATATION;  Surgeon: Amie Mcclure MD;  Location: [facility] ENDOSCOPY;  Service: Gastroenterology;  Laterality: N/A;  SCHATZKI'S RING, HIATAL HERNIA   • TUBAL ABDOMINAL LIGATION     • UPPER GASTROINTESTINAL ENDOSCOPY     • URETEROSCOPY LASER LITHOTRIPSY WITH STENT INSERTION Left 2024    Procedure: LEFT URETEROSCOPY LASER LITHOTRIPSY WITH STENT INSERTION;  Surgeon: Jose Darby MD;  Location: [facility] MAIN OR;  Service: Urology;  Laterality: Left;         Family History   Problem Relation Age of Onset   • Hypertension Mother    • Diabetes Mother    • Hypertension Brother    • Colon cancer Maternal Grandmother         45s   • Melanoma Neg Hx    • Uterine cancer Neg Hx    • Ovarian cancer Neg Hx    • Breast cancer Neg Hx    • Prostate cancer Neg Hx    • Deep vein thrombosis Neg Hx    • Pulmonary embolism Neg Hx    • Coronary artery disease Neg Hx    • Stroke Neg Hx    • Osteoporosis Neg Hx    • Malig Hyperthermia Neg Hx          Social History     Socioeconomic History   • Marital status:    Tobacco Use   • Smoking status: Never      Passive exposure: Past   • Smokeless tobacco: Never   Vaping Use   • Vaping status: Never Used   Substance and Sexual Activity   • Alcohol use: Never   • Drug use: Never   • Sexual activity: Yes     Partners: Male     Birth control/protection: Tubal ligation         Home Medications:  HYDROcodone-acetaminophen, Tirzepatide-Weight Management, ketorolac, lisinopril, ondansetron ODT, oxyCODONE-acetaminophen, pantoprazole, tamsulosin, and vitamin D    Allergies:  No Known Allergies    Review of Systems   All other systems reviewed and negative except as mentioned above in the HPI    Objective   Objective     Vitals:   Temp:  [101 °F (38.3 °C)-102.1 °F (38.9 °C)] 101 °F (38.3 °C)  Heart Rate:  [] 76  Resp:  [14-20] 20  BP: (114-128)/(64-83) 114/64    Physical Exam    Constitutional: Awake, alert, no acute distress   Eyes: Pupils equal, sclerae anicteric, no conjunctival injection   HENT: NCAT, mucous membranes moist   Respiratory: Clear to auscultation bilaterally, nonlabored respirations    Cardiovascular: RRR, no murmurs   Gastrointestinal: Positive bowel sounds, soft, mild tenderness in the left flank region, nondistended   Musculoskeletal: No bilateral ankle edema, no clubbing or cyanosis to extremities   Psychiatric: Appropriate affect, cooperative   Neurologic: Oriented x 3, speech clear   Skin: No rashes     Result Review    Result Review:  I have personally reviewed the results from the time of this admission to 8/27/2024 03:13 EDT and agree with these findings:  [x]  Laboratory  []  Microbiology  [x]  Radiology  [x]  EKG/Telemetry   []  Cardiology/Vascular   []  Pathology  []  Old records  []  Other:        Imaging Results (Last 24 Hours)       Procedure Component Value Units Date/Time    CT Abdomen Pelvis With Contrast [[id number]] Collected: 08/27/24 0033     Updated: 08/27/24 0052    Narrative:      CT ABDOMEN PELVIS W CONTRAST-     Date of exam: 8/27/2024 12:15 AM.     Indications: abd. pain, alessia.  left-sided     Comparisons: 8/22/2024; 8/20/2024.     TECHNIQUE:  Axial CT images were obtained of the abdomen and pelvis following the  uneventful intravenous administration of 100 mL (or less) of Isovue-370  contrast agent. Reconstructed 2D (two-dimensional) coronal and sagittal  images were also obtained. Automated exposure control and iterative  construction methods were used. No oral contrast agent was administered  for the study. Please see the EMR (i.e., [facility]) for the documented dose  of intravenous contrast agent as well as the radiation dose.     FINDINGS:  Interval removal of the left ureteral stent is noted. Mild-to-moderate  left hydronephrosis is seen with a relative transition zone involving  the proximal ureter (probably at about the expected ureteropelvic  junction, UPJ). A left ureteral stricture is possible. No obstructing  ureteral calculi are appreciated. There may be associated  age-indeterminate left-sided infectious/inflammatory  pyelitis/ureteritis. No definite pyelonephritis. No intrarenal or  perinephric abscess is suggested. No ectopic gas foci are seen. There  may be minimal delay in function of the left kidney relative to the  right kidney. The urinary bladder is distended. Age-indeterminate  infectious/inflammatory cystitis is possible. Nonobstructing renal  calyceal stones are present bilaterally, greater in size and number on  the right, estimated about 6 mm in greatest diameter. No right-sided  hydronephrosis or right hydroureter. No right ureterolithiasis.  Additionally, there is diffuse hepatic steatosis with hepatomegaly. No  splenomegaly. There are tiny bilateral pleural effusions, new since the  prior study. There may be mild subsegmental atelectasis in the lung  bases. A moderate stool burden is suggested. Constipation is possible.  Otherwise, no acute findings are appreciated.          Impression:         1.  There has been interval removal of the left ureteral stent since  the  prior 8/22/2024 intraoperative study.      2.  Mild-to-moderate left-sided hydronephrosis is seen with a suspected  relative transition zone at about the ureteropelvic junction (UPJ). A  proximal left ureteral stricture is possible.      3.  No obstructing ureteral calculus is appreciated.      4.  There may be age-indeterminate infectious/inflammatory left-sided  pyelitis/ureteritis.      5.  Probably no left-sided pyelonephritis.      6.  Age-indeterminate infectious/inflammatory cystitis cannot be  excluded.      7.  New tiny bilateral pleural effusions are noted.      8.  Otherwise, no acute findings are appreciated.        Portions of this note were completed with a voice recognition program.        Electronically Signed By-John Mitchell MD On:8/27/2024 12:50 AM                      Assessment & Plan   Assessment / Plan     Assessment/Plan:   Mild to moderate left-sided hydronephrosis  Recent left obstructive ureteral stone at the UPJ s/p lithotripsy and left ureteral stent placement on 8/22/2024, removed manually on 8/25  Left ureteritis/pyelitis  Sepsis due to the above, present on admission, fever, tachycardia  COVID-19 infection  Hypertension  CAD    Plan  Admit to inpatient, remote telemetry  Continue ceftriaxone  Flomax  Continuous IV fluids at 125 cc/h  Pain control with IV analgesics as needed every 4 hours  P.o. Tylenol for fever as needed every 6 hours  Zofran every 6 hours as needed  Follow-up on urine and blood cultures  N.p.o. except sips with meds  Hypoglycemic protocol  Urology consulted  Resume other appropriate home medications once reconciled and cleared for p.o. intake      VTE Prophylaxis:  Mechanical VTE prophylaxis orders are signed & held.      CODE STATUS:    Level Of Support Discussed With: Patient  Code Status (Patient has no pulse and is not breathing): CPR (Attempt to Resuscitate)  Medical Interventions (Patient has pulse or is breathing): Full Support      Admission Status:  I  believe this patient meets inpatient status.    Part of this note may be an electronic transcription/translation of spoken language to printed text using the Dragon Dictation System    Marvel Whitaker MD

## 2024-08-27 NOTE — ED PROVIDER NOTES
Time: 11:43 PM EDT  Date of encounter:  2024  Independent Historian/Clinical History and Information was obtained by:   Patient    History is limited by: N/A    Chief Complaint: Flank pain and fever      History of Present Illness:  Patient is a 39 y.o. year old female who presents to the emergency department for evaluation of flank pain and fever.  Patient had lithotripsy done 4 days ago with Dr. Darby.  She had a left ureteral stent placed which she removed for instructions yesterday.  Since yesterday patient has developed fever increasing left flank pain.      Patient Care Team  Primary Care Provider: Rachael Waters APRN    Past Medical History:     No Known Allergies  Past Medical History:   Diagnosis Date    Colon polyp     Have had colonoscopy bc of family histoey - polyps seen - no sognoficant fondings    Foot pain, left     GERD (gastroesophageal reflux disease)     Heel pain     Hypertension     Take a low dose of kedication daily    Numbness in feet      Past Surgical History:   Procedure Laterality Date     SECTION      COLONOSCOPY      COLONOSCOPY N/A 5/3/2024    Procedure: COLONOSCOPY WITH COLD SNARE POLYPECTOMY;  Surgeon: Amie Mcclure MD;  Location: [facility] ENDOSCOPY;  Service: Gastroenterology;  Laterality: N/A;  COLON POLYP, DIVERTICULOSIS    ENDOSCOPY N/A 5/3/2024    Procedure: ESOPHAGOGASTRODUODENOSCOPY WITH BIOPSIES AND 15-18MM BALLOON DILATATION;  Surgeon: Amie Mcclure MD;  Location: [facility] ENDOSCOPY;  Service: Gastroenterology;  Laterality: N/A;  SCHATZKI'S RING, HIATAL HERNIA    TUBAL ABDOMINAL LIGATION      UPPER GASTROINTESTINAL ENDOSCOPY      URETEROSCOPY LASER LITHOTRIPSY WITH STENT INSERTION Left 2024    Procedure: LEFT URETEROSCOPY LASER LITHOTRIPSY WITH STENT INSERTION;  Surgeon: Jose Darby MD;  Location: [facility] MAIN OR;  Service: Urology;  Laterality: Left;     Family History   Problem Relation Age of Onset     Hypertension Mother     Diabetes Mother     Hypertension Brother     Colon cancer Maternal Grandmother         45s    Melanoma Neg Hx     Uterine cancer Neg Hx     Ovarian cancer Neg Hx     Breast cancer Neg Hx     Prostate cancer Neg Hx     Deep vein thrombosis Neg Hx     Pulmonary embolism Neg Hx     Coronary artery disease Neg Hx     Stroke Neg Hx     Osteoporosis Neg Hx     Malig Hyperthermia Neg Hx        Home Medications:  Prior to Admission medications    Medication Sig Start Date End Date Taking? Authorizing Provider   HYDROcodone-acetaminophen (NORCO) 5-325 MG per tablet Take 1 tablet by mouth Every 6 (Six) Hours As Needed for Moderate Pain (Pain). 8/22/24   Jose Darby MD   HYDROcodone-acetaminophen (NORCO) 5-325 MG per tablet Take 1 tablet by mouth Every 6 (Six) Hours As Needed for Moderate Pain. 8/26/24   Jose Darby MD   ketorolac (TORADOL) 10 MG tablet Take 1 tablet by mouth Every 6 (Six) Hours As Needed for Moderate Pain. 8/20/24   Delia Rich APRN   lisinopril (PRINIVIL,ZESTRIL) 10 MG tablet TAKE 1 TABLET BY MOUTH ONCE DAILY FOR BLOOD PRESSURE 8/21/24   Rahcael Waters APRN   ondansetron ODT (ZOFRAN-ODT) 4 MG disintegrating tablet Place 1 tablet on the tongue 4 (Four) Times a Day As Needed for Nausea or Vomiting. 8/20/24   Delia Rich APRN   oxyCODONE-acetaminophen (PERCOCET) 5-325 MG per tablet Take 1 tablet by mouth Every 6 (Six) Hours As Needed for Moderate Pain. 8/20/24   Artem Boyd MD   pantoprazole (PROTONIX) 20 MG EC tablet Take 1 tablet by mouth Daily. 6/11/24   Jess Singleton APRN   tamsulosin (FLOMAX) 0.4 MG capsule 24 hr capsule Take 1 capsule by mouth Daily. 8/20/24   Delia Rich APRN   vitamin D (ERGOCALCIFEROL) 1.25 MG (83945 UT) capsule capsule Take 1 capsule by mouth 1 (One) Time Per Week. 2/26/24   Rachael Waters APRN   Zepbound 2.5 MG/0.5ML solution auto-injector  5/8/24   Provider, MD Jocelyn        Social History:  "  Social History     Tobacco Use    Smoking status: Never     Passive exposure: Past    Smokeless tobacco: Never   Vaping Use    Vaping status: Never Used   Substance Use Topics    Alcohol use: Never    Drug use: Never         Review of Systems:  Review of Systems   Gastrointestinal:  Positive for nausea.   Genitourinary:  Positive for flank pain.        Physical Exam:  /85 (BP Location: Right arm, Patient Position: Lying)   Pulse (!) 134   Temp (!) 102.7 °F (39.3 °C) (Oral) Comment: nurse notified  Resp 22   Ht 149.9 cm (59\")   Wt 89.5 kg (197 lb 5 oz)   LMP 08/20/2024 (Exact Date)   SpO2 100%   BMI 39.85 kg/m²     Physical Exam  Vitals and nursing note reviewed.   Constitutional:       General: She is not in acute distress.  Cardiovascular:      Rate and Rhythm: Normal rate.   Pulmonary:      Effort: Pulmonary effort is normal. No respiratory distress.   Abdominal:      General: Abdomen is flat.      Palpations: Abdomen is soft.      Tenderness: There is abdominal tenderness in the left lower quadrant.      Hernia: No hernia is present.   Musculoskeletal:         General: Normal range of motion.      Cervical back: Normal range of motion and neck supple.   Skin:     General: Skin is warm and dry.   Neurological:      Mental Status: She is alert and oriented to person, place, and time. Mental status is at baseline.                  Procedures:  Procedures      Medical Decision Making:      Comorbidities that affect care:    Hypertension    External Notes reviewed:    Previous Operation Note: Operative report from 8/22/2020 from Dr. Darby for lithotripsy of the left ureteral stent placement.      The following orders were placed and all results were independently analyzed by me:  Orders Placed This Encounter   Procedures    Blood Culture - Blood,    Blood Culture - Blood,    COVID-19, FLU A/B, RSV PCR 1 HR TAT - Swab, Nasopharynx    Urine Culture - Urine, Urine, Clean Catch    CT Abdomen Pelvis With " Contrast    Comprehensive Metabolic Panel    Lactic Acid, Plasma    [place] Draw    CBC Auto Differential    Urinalysis With Microscopic If Indicated (No Culture) - Urine, Clean Catch    Urinalysis, Microscopic Only - Urine, Clean Catch    Basic Metabolic Panel    Magnesium    Phosphorus    NPO Diet NPO Type: Strict NPO    Diet: Regular/House; Fluid Consistency: Thin (IDDSI 0)    Undress & Gown    Vital Signs    Vital Signs    Intake & Output    Weigh Patient    Oral Care    Place Sequential Compression Device    Maintain Sequential Compression Device    Remote Cardiac Monitor    Maintain IV Access    Continuous Pulse Oximetry    Opioid Administration - Document EtCO2 and / or SpO2 With Each Set of Vitals & Any Change in Patient Status    Opioid Administration - Notify Provider Hypercapnic Monitoring    Apply Heat To Affected Area    Code Status and Medical Interventions: CPR (Attempt to Resuscitate); Full Support    Inpatient Urology Consult    Inpatient Hospitalist Consult    Inpatient Hospitalist Consult    PT Consult: Eval & Treat Functional Mobility Below Baseline    Oxygen Therapy- Nasal Cannula; Titrate 1-6 LPM Per SpO2; 90 - 95%    Oxygen Therapy- Nasal Cannula; Titrate 1-6 LPM Per SpO2; 90 - 95%    Opioid Administration - Capnography    POC Glucose Once    Insert Peripheral IV    Insert Peripheral IV    Inpatient Admission    CBC & Differential    Green Top (Gel)    Lavender Top    Gold Top - SST    Light Blue Top    CBC & Differential       Medications Given in the Emergency Department:  Medications   sodium chloride 0.9 % flush 10 mL (has no administration in time range)   lactated ringers infusion (125 mL/hr Intravenous New Bag 8/27/24 0411)   sodium chloride 0.9 % flush 10 mL (10 mL Intravenous Given 8/27/24 2045)   sodium chloride 0.9 % flush 10 mL (has no administration in time range)   sodium chloride 0.9 % infusion 40 mL (has no administration in time range)   cefTRIAXone (ROCEPHIN) 2,000 mg in  sodium chloride 0.9 % 100 mL IVPB-VTB (has no administration in time range)   sennosides-docusate (PERICOLACE) 8.6-50 MG per tablet 2 tablet (has no administration in time range)     And   polyethylene glycol (MIRALAX) packet 17 g (has no administration in time range)     And   bisacodyl (DULCOLAX) EC tablet 5 mg (has no administration in time range)     And   bisacodyl (DULCOLAX) suppository 10 mg (has no administration in time range)   ondansetron (ZOFRAN) injection 4 mg (has no administration in time range)   HYDROmorphone (DILAUDID) injection 0.5 mg (0.5 mg Intravenous Given 8/27/24 1207)     And   naloxone (NARCAN) injection 0.4 mg (has no administration in time range)   ketorolac (TORADOL) injection 15 mg (15 mg Intravenous Given 8/27/24 2044)   tamsulosin (FLOMAX) 24 hr capsule 0.4 mg (0.4 mg Oral Given 8/27/24 0505)   acetaminophen (TYLENOL) tablet 650 mg (has no administration in time range)   ibuprofen (ADVIL,MOTRIN) tablet 400 mg (400 mg Oral Given 8/27/24 2108)   HYDROcodone-acetaminophen (NORCO) 5-325 MG per tablet 1 tablet (has no administration in time range)   HYDROcodone-acetaminophen (NORCO)  MG per tablet 1 tablet (1 tablet Oral Given 8/27/24 1418)   acetaminophen (TYLENOL) tablet 1,000 mg (1,000 mg Oral Given 8/26/24 2324)   iopamidol (ISOVUE-370) 76 % injection 100 mL (100 mL Intravenous Given 8/27/24 0023)   ondansetron (ZOFRAN) injection 4 mg (4 mg Intravenous Given 8/27/24 0125)   HYDROmorphone (DILAUDID) injection 1 mg (1 mg Intravenous Given 8/27/24 0127)   cefTRIAXone (ROCEPHIN) 2,000 mg in sodium chloride 0.9 % 100 mL IVPB-VTB (0 mg Intravenous Stopped 8/27/24 0200)   ketorolac (TORADOL) injection 30 mg (30 mg Intravenous Given 8/27/24 0243)   acetaminophen (TYLENOL) tablet 975 mg (975 mg Oral Given 8/27/24 0651)        ED Course:         Labs:    Lab Results (last 24 hours)       Procedure Component Value Units Date/Time    CBC & Differential [[id number]]  (Abnormal) Collected:  08/26/24 2146    Specimen: Blood Updated: 08/26/24 2156    Narrative:      The following orders were created for panel order CBC & Differential.  Procedure                               Abnormality         Status                     ---------                               -----------         ------                     CBC Auto Differential[[id number]]        Abnormal            Final result                 Please view results for these tests on the individual orders.    Comprehensive Metabolic Panel [[id number]]  (Abnormal) Collected: 08/26/24 2146    Specimen: Blood Updated: 08/26/24 2213     Glucose 96 mg/dL      BUN 8 mg/dL      Creatinine 0.74 mg/dL      Sodium 134 mmol/L      Potassium 3.8 mmol/L      Chloride 101 mmol/L      CO2 22.5 mmol/L      Calcium 8.7 mg/dL      Total Protein 6.6 g/dL      Albumin 3.4 g/dL      ALT (SGPT) 9 U/L      AST (SGOT) 13 U/L      Alkaline Phosphatase 62 U/L      Total Bilirubin 0.3 mg/dL      Globulin 3.2 gm/dL      A/G Ratio 1.1 g/dL      BUN/Creatinine Ratio 10.8     Anion Gap 10.5 mmol/L      eGFR 105.0 mL/min/1.73     Narrative:      GFR Normal >60  Chronic Kidney Disease <60  Kidney Failure <15      Lactic Acid, Plasma [[id number]]  (Normal) Collected: 08/26/24 2146    Specimen: Blood Updated: 08/26/24 2209     Lactate 0.9 mmol/L     Blood Culture - Blood, Arm, Right [[id number]] Collected: 08/26/24 2146    Specimen: Blood from Arm, Right Updated: 08/26/24 2153    CBC Auto Differential [[id number]]  (Abnormal) Collected: 08/26/24 2146    Specimen: Blood Updated: 08/26/24 2156     WBC 8.99 10*3/mm3      RBC 4.13 10*6/mm3      Hemoglobin 12.2 g/dL      Hematocrit 37.3 %      MCV 90.3 fL      MCH 29.5 pg      MCHC 32.7 g/dL      RDW 12.1 %      RDW-SD 39.9 fl      MPV 10.2 fL      Platelets 239 10*3/mm3      Neutrophil % 82.5 %      Lymphocyte % 8.9 %      Monocyte % 6.9 %      Eosinophil % 1.1 %      Basophil % 0.3 %      Immature Grans % 0.3 %      Neutrophils, Absolute 7.41  10*3/mm3      Lymphocytes, Absolute 0.80 10*3/mm3      Monocytes, Absolute 0.62 10*3/mm3      Eosinophils, Absolute 0.10 10*3/mm3      Basophils, Absolute 0.03 10*3/mm3      Immature Grans, Absolute 0.03 10*3/mm3      nRBC 0.0 /100 WBC     COVID-19, FLU A/B, RSV PCR 1 HR TAT - Swab, Nasopharynx [[id number]]  (Abnormal) Collected: 08/26/24 2326    Specimen: Swab from Nasopharynx Updated: 08/27/24 0037     COVID19 Detected     Influenza A PCR Not Detected     Influenza B PCR Not Detected     RSV, PCR Not Detected    Narrative:      Fact sheet for providers: https://www.fda.gov/media/832621/download    Fact sheet for patients: https://www.fda.gov/media/902466/download    Test performed by PCR.    Blood Culture - Blood, Arm, Left [[id number]] Collected: 08/26/24 2340    Specimen: Blood from Arm, Left Updated: 08/27/24 0015    Urinalysis With Microscopic If Indicated (No Culture) - Urine, Clean Catch [[id number]]  (Abnormal) Collected: 08/27/24 0032    Specimen: Urine, Clean Catch Updated: 08/27/24 0050     Color, UA Yellow     Appearance, UA Cloudy     pH, UA 6.5     Specific Gravity, UA 1.014     Glucose, UA Negative     Ketones, UA 15 mg/dL (1+)     Bilirubin, UA Negative     Blood, UA Small (1+)     Protein, UA Trace     Leuk Esterase, UA Large (3+)     Nitrite, UA Negative     Urobilinogen, UA 1.0 E.U./dL    Urinalysis, Microscopic Only - Urine, Clean Catch [[id number]]  (Abnormal) Collected: 08/27/24 0032    Specimen: Urine, Clean Catch Updated: 08/27/24 0050     RBC, UA 6-10 /HPF      WBC, UA Too Numerous to Count /HPF      Bacteria, UA 2+ /HPF      Squamous Epithelial Cells, UA 0-2 /HPF      Hyaline Casts, UA None Seen /LPF      Methodology Automated Microscopy    Urine Culture - Urine, Urine, Clean Catch [[id number]] Collected: 08/27/24 0032    Specimen: Urine, Clean Catch Updated: 08/27/24 0453    POC Glucose Once [[id number]]  (Abnormal) Collected: 08/27/24 1226    Specimen: Blood Updated: 08/27/24 1228     Glucose  117 mg/dL      Comment: Serial Number: [id number]:  [id number]                Imaging:    CT Abdomen Pelvis With Contrast    Result Date: 8/27/2024  CT ABDOMEN PELVIS W CONTRAST-  Date of exam: 8/27/2024 12:15 AM.  Indications: abd. pain, alesisa. left-sided  Comparisons: 8/22/2024; 8/20/2024.  TECHNIQUE: Axial CT images were obtained of the abdomen and pelvis following the uneventful intravenous administration of 100 mL (or less) of Isovue-370 contrast agent. Reconstructed 2D (two-dimensional) coronal and sagittal images were also obtained. Automated exposure control and iterative construction methods were used. No oral contrast agent was administered for the study. Please see the EMR (i.e., [facility]) for the documented dose of intravenous contrast agent as well as the radiation dose.  FINDINGS: Interval removal of the left ureteral stent is noted. Mild-to-moderate left hydronephrosis is seen with a relative transition zone involving the proximal ureter (probably at about the expected ureteropelvic junction, UPJ). A left ureteral stricture is possible. No obstructing ureteral calculi are appreciated. There may be associated age-indeterminate left-sided infectious/inflammatory pyelitis/ureteritis. No definite pyelonephritis. No intrarenal or perinephric abscess is suggested. No ectopic gas foci are seen. There may be minimal delay in function of the left kidney relative to the right kidney. The urinary bladder is distended. Age-indeterminate infectious/inflammatory cystitis is possible. Nonobstructing renal calyceal stones are present bilaterally, greater in size and number on the right, estimated about 6 mm in greatest diameter. No right-sided hydronephrosis or right hydroureter. No right ureterolithiasis. Additionally, there is diffuse hepatic steatosis with hepatomegaly. No splenomegaly. There are tiny bilateral pleural effusions, new since the prior study. There may be mild subsegmental atelectasis in the lung  bases. A moderate stool burden is suggested. Constipation is possible. Otherwise, no acute findings are appreciated.        1.  There has been interval removal of the left ureteral stent since the prior 8/22/2024 intraoperative study.  2.  Mild-to-moderate left-sided hydronephrosis is seen with a suspected relative transition zone at about the ureteropelvic junction (UPJ). A proximal left ureteral stricture is possible.  3.  No obstructing ureteral calculus is appreciated.  4.  There may be age-indeterminate infectious/inflammatory left-sided pyelitis/ureteritis.  5.  Probably no left-sided pyelonephritis.  6.  Age-indeterminate infectious/inflammatory cystitis cannot be excluded.  7.  New tiny bilateral pleural effusions are noted.  8.  Otherwise, no acute findings are appreciated.   Portions of this note were completed with a voice recognition program.   Electronically Signed By-John Mitchell MD On:8/27/2024 12:50 AM         Differential Diagnosis and Discussion:    Fever: Based on the complaint of fever, differential diagnosis includes but is not limited to meningitis, pneumonia, pyelonephritis, acute uti,  systemic immune response syndrome, sepsis, viral syndrome, fungal infection, tick born illness and other bacterial infections.  Flank Pain: Differential diagnosis includes but is not limited to kidney stones, pyelonephritis, musculoskeletal disorders, renal infarction, urinary tract infection, hydronephrosis, radiculopathy, aortic aneurysm, renal cell carcinoma.    All labs were reviewed and interpreted by me.  CT scan radiology impression was interpreted by me.    MDM           Patient Care Considerations:          Consultants/Shared Management Plan:  Patient discussed with Dr. Gomez, urology, who recommends patient be admitted consult.      Hospitalist: I have discussed the case with dr. Whitaker who agrees to accept the patient for admission.    Social Determinants of Health:    Patient is independent,  reliable, and has access to care.       Disposition and Care Coordination:    Admit:   Through independent evaluation of the patient's history, physical, and imperical data, the patient meets criteria for inpatient admission to the hospital.      Final diagnoses:   Fever in adult   COVID   Acute urinary tract infection   Pyelitis        ED Disposition       ED Disposition   Decision to Admit    Condition   --    Comment   Level of Care: Remote Telemetry [26]   Diagnosis: Hydronephrosis of left kidney [423811]   Certification: I Certify That Inpatient Hospital Services Are Medically Necessary For Greater Than 2 Midnights                 This medical record created using voice recognition software.             Mehrdad Penny DO  08/27/24 2147

## 2024-08-28 LAB
ANION GAP SERPL CALCULATED.3IONS-SCNC: 10.4 MMOL/L (ref 5–15)
BASOPHILS # BLD AUTO: 0.04 10*3/MM3 (ref 0–0.2)
BASOPHILS NFR BLD AUTO: 0.3 % (ref 0–1.5)
BUN SERPL-MCNC: 8 MG/DL (ref 6–20)
BUN/CREAT SERPL: 11.6 (ref 7–25)
CALCIUM SPEC-SCNC: 8.2 MG/DL (ref 8.6–10.5)
CHLORIDE SERPL-SCNC: 101 MMOL/L (ref 98–107)
CO2 SERPL-SCNC: 22.6 MMOL/L (ref 22–29)
CREAT SERPL-MCNC: 0.69 MG/DL (ref 0.57–1)
DEPRECATED RDW RBC AUTO: 41.6 FL (ref 37–54)
EGFRCR SERPLBLD CKD-EPI 2021: 112.7 ML/MIN/1.73
EOSINOPHIL # BLD AUTO: 0.05 10*3/MM3 (ref 0–0.4)
EOSINOPHIL NFR BLD AUTO: 0.4 % (ref 0.3–6.2)
ERYTHROCYTE [DISTWIDTH] IN BLOOD BY AUTOMATED COUNT: 12.5 % (ref 12.3–15.4)
GLUCOSE SERPL-MCNC: 112 MG/DL (ref 65–99)
HCT VFR BLD AUTO: 35.6 % (ref 34–46.6)
HGB BLD-MCNC: 11.6 G/DL (ref 12–15.9)
IMM GRANULOCYTES # BLD AUTO: 0.06 10*3/MM3 (ref 0–0.05)
IMM GRANULOCYTES NFR BLD AUTO: 0.5 % (ref 0–0.5)
LYMPHOCYTES # BLD AUTO: 1.49 10*3/MM3 (ref 0.7–3.1)
LYMPHOCYTES NFR BLD AUTO: 12.8 % (ref 19.6–45.3)
MAGNESIUM SERPL-MCNC: 1.8 MG/DL (ref 1.6–2.6)
MCH RBC QN AUTO: 29.8 PG (ref 26.6–33)
MCHC RBC AUTO-ENTMCNC: 32.6 G/DL (ref 31.5–35.7)
MCV RBC AUTO: 91.5 FL (ref 79–97)
MONOCYTES # BLD AUTO: 1.12 10*3/MM3 (ref 0.1–0.9)
MONOCYTES NFR BLD AUTO: 9.6 % (ref 5–12)
NEUTROPHILS NFR BLD AUTO: 76.4 % (ref 42.7–76)
NEUTROPHILS NFR BLD AUTO: 8.91 10*3/MM3 (ref 1.7–7)
NRBC BLD AUTO-RTO: 0 /100 WBC (ref 0–0.2)
PHOSPHATE SERPL-MCNC: 2.7 MG/DL (ref 2.5–4.5)
PLATELET # BLD AUTO: 224 10*3/MM3 (ref 140–450)
PMV BLD AUTO: 10.2 FL (ref 6–12)
POTASSIUM SERPL-SCNC: 3.7 MMOL/L (ref 3.5–5.2)
RBC # BLD AUTO: 3.89 10*6/MM3 (ref 3.77–5.28)
SODIUM SERPL-SCNC: 134 MMOL/L (ref 136–145)
WBC NRBC COR # BLD AUTO: 11.67 10*3/MM3 (ref 3.4–10.8)

## 2024-08-28 PROCEDURE — 25010000002 ONDANSETRON PER 1 MG: Performed by: STUDENT IN AN ORGANIZED HEALTH CARE EDUCATION/TRAINING PROGRAM

## 2024-08-28 PROCEDURE — 99233 SBSQ HOSP IP/OBS HIGH 50: CPT | Performed by: INTERNAL MEDICINE

## 2024-08-28 PROCEDURE — 85025 COMPLETE CBC W/AUTO DIFF WBC: CPT | Performed by: INTERNAL MEDICINE

## 2024-08-28 PROCEDURE — 80048 BASIC METABOLIC PNL TOTAL CA: CPT | Performed by: INTERNAL MEDICINE

## 2024-08-28 PROCEDURE — 99231 SBSQ HOSP IP/OBS SF/LOW 25: CPT | Performed by: UROLOGY

## 2024-08-28 PROCEDURE — 25810000003 SODIUM CHLORIDE 0.9 % SOLUTION: Performed by: INTERNAL MEDICINE

## 2024-08-28 PROCEDURE — 83735 ASSAY OF MAGNESIUM: CPT | Performed by: INTERNAL MEDICINE

## 2024-08-28 PROCEDURE — 25010000002 KETOROLAC TROMETHAMINE PER 15 MG: Performed by: STUDENT IN AN ORGANIZED HEALTH CARE EDUCATION/TRAINING PROGRAM

## 2024-08-28 PROCEDURE — 97161 PT EVAL LOW COMPLEX 20 MIN: CPT

## 2024-08-28 PROCEDURE — 84100 ASSAY OF PHOSPHORUS: CPT | Performed by: INTERNAL MEDICINE

## 2024-08-28 PROCEDURE — 25010000002 MEROPENEM PER 100 MG: Performed by: INTERNAL MEDICINE

## 2024-08-28 RX ORDER — PANTOPRAZOLE SODIUM 20 MG/1
20 TABLET, DELAYED RELEASE ORAL DAILY
Status: DISCONTINUED | OUTPATIENT
Start: 2024-08-28 | End: 2024-08-30 | Stop reason: HOSPADM

## 2024-08-28 RX ORDER — SODIUM CHLORIDE 9 MG/ML
75 INJECTION, SOLUTION INTRAVENOUS CONTINUOUS
Status: DISCONTINUED | OUTPATIENT
Start: 2024-08-28 | End: 2024-08-29

## 2024-08-28 RX ADMIN — IBUPROFEN 400 MG: 400 TABLET ORAL at 13:52

## 2024-08-28 RX ADMIN — SODIUM CHLORIDE 150 ML/HR: 9 INJECTION, SOLUTION INTRAVENOUS at 23:10

## 2024-08-28 RX ADMIN — TAMSULOSIN HYDROCHLORIDE 0.4 MG: 0.4 CAPSULE ORAL at 09:05

## 2024-08-28 RX ADMIN — ONDANSETRON 4 MG: 2 INJECTION INTRAMUSCULAR; INTRAVENOUS at 23:21

## 2024-08-28 RX ADMIN — Medication 10 ML: at 23:11

## 2024-08-28 RX ADMIN — MEROPENEM 500 MG: 500 INJECTION, POWDER, FOR SOLUTION INTRAVENOUS at 10:10

## 2024-08-28 RX ADMIN — MEROPENEM 500 MG: 500 INJECTION, POWDER, FOR SOLUTION INTRAVENOUS at 23:11

## 2024-08-28 RX ADMIN — PANTOPRAZOLE 20 MG: 20 TABLET, DELAYED RELEASE ORAL at 09:05

## 2024-08-28 RX ADMIN — HYDROCODONE BITARTRATE AND ACETAMINOPHEN 1 TABLET: 5; 325 TABLET ORAL at 09:05

## 2024-08-28 RX ADMIN — HYDROCODONE BITARTRATE AND ACETAMINOPHEN 1 TABLET: 5; 325 TABLET ORAL at 23:21

## 2024-08-28 RX ADMIN — SENNOSIDES AND DOCUSATE SODIUM 2 TABLET: 50; 8.6 TABLET ORAL at 23:30

## 2024-08-28 RX ADMIN — MEROPENEM 500 MG: 500 INJECTION, POWDER, FOR SOLUTION INTRAVENOUS at 16:15

## 2024-08-28 RX ADMIN — KETOROLAC TROMETHAMINE 15 MG: 15 INJECTION, SOLUTION INTRAMUSCULAR; INTRAVENOUS at 04:22

## 2024-08-28 RX ADMIN — SODIUM CHLORIDE 150 ML/HR: 9 INJECTION, SOLUTION INTRAVENOUS at 10:10

## 2024-08-28 RX ADMIN — HYDROCODONE BITARTRATE AND ACETAMINOPHEN 1 TABLET: 5; 325 TABLET ORAL at 16:28

## 2024-08-28 NOTE — PROGRESS NOTES
[facility]   Urology Progress Note    Patient Name: Neha May  : 1985  MRN: [id number]  Primary Care Physician:  Rachael Waters APRN  Date of admission: 2024    Subjective   Subjective     Fever to 102 overnight, afebrile currently    No chest pain or shortness of air    Ambulating    Headache    Some flank pain but possibly better        Objective   Objective     Vitals:   Temp:  [98.4 °F (36.9 °C)-102.7 °F (39.3 °C)] 98.4 °F (36.9 °C)  Heart Rate:  [] 87  Resp:  [18-22] 18  BP: ()/(52-85) 90/52  Physical Exam     Alert and oriented x3  No acute distress  Unlabored respirations  Nontender/nondistended       Result Review    Result Review:  I have personally reviewed the results from the time of this admission to 2024 06:56 EDT and agree with these findings:  []  Laboratory  []  Microbiology  []  Radiology  []  EKG/Telemetry   []  Cardiology/Vascular   []  Pathology  []  Old records  []  Other:      Assessment & Plan   Assessment / Plan     Brief Patient Summary:  Neha May is a 39 y.o. female     Active Hospital Problems:  Active Hospital Problems    Diagnosis     **Hydronephrosis of left kidney          Left flank pain  Fever  UTI  Possible urinary sepsis  COVID         Will continue conservative management at this time     No surgical intervention planned.    Can have Diet     Will follow      Appreciate hospitalist help with this patient    Electronically signed by Jose Darby MD, 24, 6:56 AM EDT.

## 2024-08-28 NOTE — THERAPY EVALUATION
Acute Care - Physical Therapy Initial Evaluation   [place]     Patient Name: Neha May  : 1985  MRN: [id number]  Today's Date: 2024      Visit Dx:     ICD-10-CM ICD-9-CM   1. Fever in adult  R50.9 780.60   2. COVID  U07.1 079.89   3. Acute urinary tract infection  N39.0 599.0   4. Pyelitis  N12 590.80   5. Difficulty walking  R26.2 719.7     Patient Active Problem List   Diagnosis    Benign essential HTN    Gastroesophageal reflux disease without esophagitis    Annual physical exam    Vitamin D deficiency    Hiatal hernia    History of adenomatous polyp of colon    FH: colon cancer    Gastroesophageal reflux disease with esophagitis without hemorrhage    Class 2 severe obesity due to excess calories with serious comorbidity and body mass index (BMI) of 38.0 to 38.9 in adult    Left ureteral stone    Hydronephrosis of left kidney     Past Medical History:   Diagnosis Date    Colon polyp     Have had colonoscopy bc of family histoey - polyps seen - no sognoficant fondings    Foot pain, left     GERD (gastroesophageal reflux disease)     Heel pain     Hypertension     Take a low dose of kedication daily    Numbness in feet      Past Surgical History:   Procedure Laterality Date     SECTION      COLONOSCOPY      COLONOSCOPY N/A 5/3/2024    Procedure: COLONOSCOPY WITH COLD SNARE POLYPECTOMY;  Surgeon: Amie Mcclure MD;  Location: [facility] ENDOSCOPY;  Service: Gastroenterology;  Laterality: N/A;  COLON POLYP, DIVERTICULOSIS    ENDOSCOPY N/A 5/3/2024    Procedure: ESOPHAGOGASTRODUODENOSCOPY WITH BIOPSIES AND 15-18MM BALLOON DILATATION;  Surgeon: Amie Mcclure MD;  Location: [facility] ENDOSCOPY;  Service: Gastroenterology;  Laterality: N/A;  SCHATZKI'S RING, HIATAL HERNIA    TUBAL ABDOMINAL LIGATION      UPPER GASTROINTESTINAL ENDOSCOPY      URETEROSCOPY LASER LITHOTRIPSY WITH STENT INSERTION Left 2024    Procedure: LEFT URETEROSCOPY LASER LITHOTRIPSY WITH STENT  INSERTION;  Surgeon: Jose Darby MD;  Location: [facility] MAIN OR;  Service: Urology;  Laterality: Left;     PT Assessment (Last 12 Hours)       PT Evaluation and Treatment       Row Name 08/28/24 1400          Physical Therapy Time and Intention    Subjective Information no complaints  -CS     Document Type evaluation  -CS     Mode of Treatment individual therapy;physical therapy  -CS     Patient Effort good  -CS       Row Name 08/28/24 1400          General Information    Patient Profile Reviewed yes  -CS     Patient Observations alert;cooperative;agree to therapy  -CS     Prior Level of Function independent:;all household mobility;gait;transfer;bed mobility;ADL's  -CS     Equipment Currently Used at Home none  -CS     Existing Precautions/Restrictions no known precautions/restrictions  -CS     Barriers to Rehab none identified  -CS       Row Name 08/28/24 1400          Living Environment    Current Living Arrangements home  -CS     People in Home spouse;child(haja), dependent  -CS     Primary Care Provided by self  -CS       Row Name 08/28/24 1400          Pain    Pretreatment Pain Rating 0/10 - no pain  -CS     Posttreatment Pain Rating 0/10 - no pain  -CS       Row Name 08/28/24 1400          Cognition    Orientation Status (Cognition) oriented x 3  -CS       Row Name 08/28/24 1400          Range of Motion Comprehensive    General Range of Motion no range of motion deficits identified  -CS       Row Name 08/28/24 1400          Strength Comprehensive (MMT)    General Manual Muscle Testing (MMT) Assessment no strength deficits identified  -CS       Row Name 08/28/24 1400          Bed Mobility    Bed Mobility bed mobility (all) activities  -CS     All Activities, [place] (Bed Mobility) independent  -CS       Row Name 08/28/24 1400          Transfers    Comment, (Transfers) Pt able to complete functional transfers independently  -CS       Row Name 08/28/24 1400          Gait/Stairs (Locomotion)    Comment,  (Gait/Stairs) Pt is able to ambulate around the room without an AD with no loss of balance or safey issues.  -CS       Row Name 08/28/24 1400          Safety Issues, Functional Mobility    Impairments Affecting Function (Mobility) other (see comments)  no applicable  -CS       Row Name 08/28/24 1400          Balance    Balance Assessment standing dynamic balance  -CS     Dynamic Standing Balance independent  -CS     Position/Device Used, Standing Balance unsupported  -CS       Row Name 08/28/24 1400          Plan of Care Review    Plan of Care Reviewed With patient  -CS     Progress no change  -CS     Outcome Evaluation Pt presents with no physical limitations that impede her ability to return home independently or with assist from family as needed. Pt was encouraged to continue ambulating as tolerated while here at the hospital. She will be discharged from PT caseload.  -CS       Row Name 08/28/24 1400          Positioning and Restraints    Pre-Treatment Position in bed  -CS     Post Treatment Position bed  -CS     In Bed supine;sitting EOB;call light within reach  -CS       Row Name 08/28/24 1400          Therapy Assessment/Plan (PT)    Criteria for Skilled Interventions Met (PT) no problems identified which require skilled intervention  -CS     Therapy Frequency (PT) evaluation only  -CS       Row Name 08/28/24 1400          PT Evaluation Complexity    History, PT Evaluation Complexity no personal factors and/or comorbidities  -CS     Examination of Body Systems (PT Eval Complexity) total of 4 or more elements  -CS     Clinical Presentation (PT Evaluation Complexity) stable  -CS     Clinical Decision Making (PT Evaluation Complexity) low complexity  -CS     Overall Complexity (PT Evaluation Complexity) low complexity  -CS       Row Name 08/28/24 1400          Therapy Plan Review/Discharge Plan (PT)    Therapy Plan Review (PT) evaluation/treatment results reviewed;patient  -CS       Row Name 08/28/24 1400           Physical Therapy Goals    Problem Specific Goal Selection (PT) problem specific goal 1, PT  -CS       Row Name 08/28/24 1400          Problem Specific Goal 1 (PT)    Problem Specific Goal 1 (PT) Complete physical therapy evaluation.  -CS     Time Frame (Problem Specific Goal 1, PT) by discharge  -CS     Progress/Outcome (Problem Specific Goal 1, PT) goal met  -CS               User Key  (r) = Recorded By, (t) = Taken By, (c) = Cosigned By      Initials Name Provider Type    Heidi Jasmine PT Physical Therapist                      PT Recommendation and Plan  Anticipated Discharge Disposition (PT): home  Therapy Frequency (PT): evaluation only  Plan of Care Reviewed With: patient  Progress: no change  Outcome Evaluation: Pt presents with no physical limitations that impede her ability to return home independently or with assist from family as needed. Pt was encouraged to continue ambulating as tolerated while here at the hospital. She will be discharged from PT caseload.   Outcome Measures       Row Name 08/28/24 1400             How much help from another person do you currently need...    Turning from your back to your side while in flat bed without using bedrails? 4  -CS      Moving from lying on back to sitting on the side of a flat bed without bedrails? 4  -CS      Moving to and from a bed to a chair (including a wheelchair)? 4  -CS      Standing up from a chair using your arms (e.g., wheelchair, bedside chair)? 4  -CS      Climbing 3-5 steps with a railing? 4  -CS      To walk in hospital room? 4  -CS      AM-PAC 6 Clicks Score (PT) 24  -CS      Highest Level of Mobility Goal 8 --> Walked 250 feet or more  -CS         Functional Assessment    Outcome Measure Options AM-PAC 6 Clicks Basic Mobility (PT)  -CS                User Key  (r) = Recorded By, (t) = Taken By, (c) = Cosigned By      Initials Name Provider Type    Heidi Jasmine PT Physical Therapist                     Time Calculation:    PT  Charges       Row Name 08/28/24 1412             Time Calculation    PT Received On 08/28/24  -CS         Untimed Charges    PT Eval/Re-eval Minutes 25  -CS         Total Minutes    Untimed Charges Total Minutes 25  -CS       Total Minutes 25  -CS                User Key  (r) = Recorded By, (t) = Taken By, (c) = Cosigned By      Initials Name Provider Type    CS Heidi Corcoran, PT Physical Therapist                  Therapy Charges for Today       Code Description Service Date Service Provider Modifiers Qty    38690433379 HC PT EVAL LOW COMPLEXITY 2 8/28/2024 Heidi Corcoran, PT GP 1            PT G-Codes  Outcome Measure Options: AM-PAC 6 Clicks Basic Mobility (PT)  AM-PAC 6 Clicks Score (PT): 24    Heidi Corcoran PT  8/28/2024

## 2024-08-28 NOTE — PLAN OF CARE
Goal Outcome Evaluation:  Plan of Care Reviewed With: patient        Progress: no change  Outcome Evaluation: Pt presents with no physical limitations that impede her ability to return home independently or with assist from family as needed. Pt was encouraged to continue ambulating as tolerated while here at the hospital. She will be discharged from PT caseload.      Anticipated Discharge Disposition (PT): home

## 2024-08-28 NOTE — PLAN OF CARE
Goal Outcome Evaluation:  Plan of Care Reviewed With: patient        Progress: improving  Outcome Evaluation: medicated for pain today, also treated for headache and fever today, changed out antibiotic to meropenem, started on regular diet, will monitor for fever and pain, will continue with plan of care

## 2024-08-28 NOTE — PLAN OF CARE
Goal Outcome Evaluation:  Plan of Care Reviewed With: patient           Outcome Evaluation: Patient is A&Ox4. Medicated x3 for pain, x2 for fever. IV ABX given per order. Patient has been NPO since midnight. Family at bedside. Plan of care ongoing.

## 2024-08-28 NOTE — PROGRESS NOTES
[facility]   Hospitalist Progress Note  Date: 2024  Patient Name: Neha May  : 1985  MRN: [id number]  Date of admission: 2024      Subjective   Subjective     Chief Complaint: Left flank pain    Summary:  39 y.o. female  with past medical history of hypertension, GERD, recently diagnosed left ureteral stone at the [facility] status post laser lithotripsy basket stone extraction, left ureteral stent insertion on 2024 by Dr. Darby presented to the ED for evaluation of left flank pain.  Since the procedure patient noted to have left flank pain that did not improve much.  Patient has pulled out the ureteral stent as instructed around 10 PM on 2024 and yesterday morning after going to the work patient started noticing fevers and worsening left flank pain and has come to the ED for further evaluation.  Upon to be febrile with concern for UTI on urinalysis.  CT abdomen pelvis with contrast showed interval removal of the left ureteral stent with mild to moderate left-sided hydronephrosis and suspected urinary transition zone at the [facility].  She was admitted for further care, started on IV antibiotics, urology was consulted.  She also tested positive for COVID but had minimal symptoms and did not require oxygen.    Interval Followup: She has remained febrile overnight with Tmax 102.7.  She continues to have left groin and flank pain but does state it is somewhat improved from yesterday.  Denies upper respiratory infection symptoms and denies congestion, sneezing, coughing.  Denies shortness of air and is ambulating about the room independently.    Objective   Objective     Vitals:   Temp:  [98.4 °F (36.9 °C)-102.7 °F (39.3 °C)] 101.8 °F (38.8 °C)  Heart Rate:  [] 91  Resp:  [18-22] 18  BP: ()/(50-85) 107/50  Physical Exam    Constitutional: Awake, alert, no acute distress, appears fatigued   Respiratory: Clear to auscultation bilaterally, nonlabored respirations     Cardiovascular: RRR, no MRG   Gastrointestinal: Positive bowel sounds, soft, nontender, nondistended   Neurologic: Oriented x 3, strength symmetric in all extremities, Cranial Nerves grossly intact to confrontation, speech clear    Result Review    I have personally reviewed the results below:  [x]  Laboratory personally reviewed BMP, CBC, magnesium, phosphorus, blood culture  []  Microbiology  []  Radiology  []  EKG/Telemetry   []  Cardiology/Vascular   []  Pathology  []  Old records  []  Other:  CBC          8/20/2024    00:04 8/26/2024    21:46 8/28/2024    04:49   CBC   WBC 6.29  8.99  11.67    RBC 4.37  4.13  3.89    Hemoglobin 13.3  12.2  11.6    Hematocrit 39.5  37.3  35.6    MCV 90.4  90.3  91.5    MCH 30.4  29.5  29.8    MCHC 33.7  32.7  32.6    RDW 12.3  12.1  12.5    Platelets 264  239  224      CMP          8/20/2024    00:04 8/26/2024    21:46 8/28/2024    04:49   CMP   Glucose 108  96  112    BUN 10  8  8    Creatinine 0.80  0.74  0.69    EGFR 95.7  105.0  112.7    Sodium 139  134  134    Potassium 3.8  3.8  3.7    Chloride 105  101  101    Calcium 8.2  8.7  8.2    Total Protein 6.9  6.6     Albumin 3.8  3.4     Globulin 3.1  3.2     Total Bilirubin <0.2  0.3     Alkaline Phosphatase 84  62     AST (SGOT) 16  13     ALT (SGPT) 13  9     Albumin/Globulin Ratio 1.2  1.1     BUN/Creatinine Ratio 12.5  10.8  11.6    Anion Gap 10.4  10.5  10.4        Assessment & Plan   Assessment / Plan   Mild to moderate left-sided hydronephrosis  UTI due to unknown bacterial source  Questionable transient bacteremia  Recent left obstructive ureteral stone at the [facility] s/p lithotripsy and left ureteral stent placement on 8/22/2024, removed manually on 8/25  Left ureteritis/pyelitis  Sepsis due to the above, present on admission, fever, tachycardia  COVID-19 infection  Hypertension  CAD    Continue to monitor in the hospital for workup and management of the above  Slight bump in WBC with persistent fevers, discussed the  case with urology and will broaden antibiotics to IV meropenem for now while we await cultures  Blood cultures have returned negative, urine culture pending  Start NS at 150 cc/h  Continue Flomax  From a COVID standpoint, patient is on room air with minimal symptoms, no indication for Decadron but should she have evidence of hypoxia, will start Decadron 6 mg daily  Tylenol or ibuprofen as needed for fevers  Antiemetics as needed  Advance to regular diet  Continue appropriate home medications  Trend renal function and electrolytes with a.m. BMP, magnesium   Trend Hgb and WBC with a.m. CBC     Discussed plan with RN, urology    VTE Prophylaxis:  Mechanical VTE prophylaxis orders are present.        CODE STATUS:   Level Of Support Discussed With: Patient  Code Status (Patient has no pulse and is not breathing): CPR (Attempt to Resuscitate)  Medical Interventions (Patient has pulse or is breathing): Full Support        Electronically signed by Stone Vasques MD, 08/28/24, 11:44 AM EDT.

## 2024-08-29 LAB
ANION GAP SERPL CALCULATED.3IONS-SCNC: 10 MMOL/L (ref 5–15)
BACTERIA SPEC AEROBE CULT: ABNORMAL
BASOPHILS # BLD AUTO: 0.05 10*3/MM3 (ref 0–0.2)
BASOPHILS NFR BLD AUTO: 0.4 % (ref 0–1.5)
BUN SERPL-MCNC: 7 MG/DL (ref 6–20)
BUN/CREAT SERPL: 10.9 (ref 7–25)
CALCIUM SPEC-SCNC: 8.1 MG/DL (ref 8.6–10.5)
CHLORIDE SERPL-SCNC: 102 MMOL/L (ref 98–107)
CO2 SERPL-SCNC: 22 MMOL/L (ref 22–29)
CREAT SERPL-MCNC: 0.64 MG/DL (ref 0.57–1)
DEPRECATED RDW RBC AUTO: 40.7 FL (ref 37–54)
EGFRCR SERPLBLD CKD-EPI 2021: 114.7 ML/MIN/1.73
EOSINOPHIL # BLD AUTO: 0.15 10*3/MM3 (ref 0–0.4)
EOSINOPHIL NFR BLD AUTO: 1.3 % (ref 0.3–6.2)
ERYTHROCYTE [DISTWIDTH] IN BLOOD BY AUTOMATED COUNT: 12.3 % (ref 12.3–15.4)
GLUCOSE SERPL-MCNC: 140 MG/DL (ref 65–99)
HCT VFR BLD AUTO: 30.5 % (ref 34–46.6)
HGB BLD-MCNC: 10 G/DL (ref 12–15.9)
IMM GRANULOCYTES # BLD AUTO: 0.06 10*3/MM3 (ref 0–0.05)
IMM GRANULOCYTES NFR BLD AUTO: 0.5 % (ref 0–0.5)
LYMPHOCYTES # BLD AUTO: 2.14 10*3/MM3 (ref 0.7–3.1)
LYMPHOCYTES NFR BLD AUTO: 19 % (ref 19.6–45.3)
MAGNESIUM SERPL-MCNC: 1.7 MG/DL (ref 1.6–2.6)
MCH RBC QN AUTO: 29.5 PG (ref 26.6–33)
MCHC RBC AUTO-ENTMCNC: 32.8 G/DL (ref 31.5–35.7)
MCV RBC AUTO: 90 FL (ref 79–97)
MONOCYTES # BLD AUTO: 1.31 10*3/MM3 (ref 0.1–0.9)
MONOCYTES NFR BLD AUTO: 11.6 % (ref 5–12)
NEUTROPHILS NFR BLD AUTO: 67.2 % (ref 42.7–76)
NEUTROPHILS NFR BLD AUTO: 7.57 10*3/MM3 (ref 1.7–7)
NRBC BLD AUTO-RTO: 0 /100 WBC (ref 0–0.2)
PHOSPHATE SERPL-MCNC: 2 MG/DL (ref 2.5–4.5)
PLATELET # BLD AUTO: 225 10*3/MM3 (ref 140–450)
PMV BLD AUTO: 10.5 FL (ref 6–12)
POTASSIUM SERPL-SCNC: 3.2 MMOL/L (ref 3.5–5.2)
RBC # BLD AUTO: 3.39 10*6/MM3 (ref 3.77–5.28)
SODIUM SERPL-SCNC: 134 MMOL/L (ref 136–145)
WBC NRBC COR # BLD AUTO: 11.28 10*3/MM3 (ref 3.4–10.8)

## 2024-08-29 PROCEDURE — 25010000002 MEROPENEM PER 100 MG: Performed by: INTERNAL MEDICINE

## 2024-08-29 PROCEDURE — 83735 ASSAY OF MAGNESIUM: CPT | Performed by: INTERNAL MEDICINE

## 2024-08-29 PROCEDURE — 99231 SBSQ HOSP IP/OBS SF/LOW 25: CPT | Performed by: UROLOGY

## 2024-08-29 PROCEDURE — 85025 COMPLETE CBC W/AUTO DIFF WBC: CPT | Performed by: INTERNAL MEDICINE

## 2024-08-29 PROCEDURE — 25010000002 CEFTRIAXONE PER 250 MG: Performed by: INTERNAL MEDICINE

## 2024-08-29 PROCEDURE — 25810000003 SODIUM CHLORIDE 0.9 % SOLUTION: Performed by: INTERNAL MEDICINE

## 2024-08-29 PROCEDURE — 84100 ASSAY OF PHOSPHORUS: CPT | Performed by: INTERNAL MEDICINE

## 2024-08-29 PROCEDURE — 80048 BASIC METABOLIC PNL TOTAL CA: CPT | Performed by: INTERNAL MEDICINE

## 2024-08-29 PROCEDURE — 99232 SBSQ HOSP IP/OBS MODERATE 35: CPT | Performed by: INTERNAL MEDICINE

## 2024-08-29 RX ORDER — FENTANYL/ROPIVACAINE/NS/PF 2-625MCG/1
15 PLASTIC BAG, INJECTION (ML) EPIDURAL
Status: COMPLETED | OUTPATIENT
Start: 2024-08-29 | End: 2024-08-29

## 2024-08-29 RX ADMIN — IBUPROFEN 400 MG: 400 TABLET ORAL at 16:53

## 2024-08-29 RX ADMIN — MEROPENEM 500 MG: 500 INJECTION, POWDER, FOR SOLUTION INTRAVENOUS at 10:12

## 2024-08-29 RX ADMIN — CEFTRIAXONE SODIUM 2000 MG: 2 INJECTION, POWDER, FOR SOLUTION INTRAMUSCULAR; INTRAVENOUS at 16:52

## 2024-08-29 RX ADMIN — PANTOPRAZOLE 20 MG: 20 TABLET, DELAYED RELEASE ORAL at 10:12

## 2024-08-29 RX ADMIN — POTASSIUM PHOSPHATES 15 MMOL: 236; 224 INJECTION, SOLUTION INTRAVENOUS at 10:12

## 2024-08-29 RX ADMIN — POTASSIUM PHOSPHATES 15 MMOL: 236; 224 INJECTION, SOLUTION INTRAVENOUS at 13:20

## 2024-08-29 RX ADMIN — MEROPENEM 500 MG: 500 INJECTION, POWDER, FOR SOLUTION INTRAVENOUS at 04:15

## 2024-08-29 RX ADMIN — TAMSULOSIN HYDROCHLORIDE 0.4 MG: 0.4 CAPSULE ORAL at 10:12

## 2024-08-29 RX ADMIN — HYDROCODONE BITARTRATE AND ACETAMINOPHEN 1 TABLET: 5; 325 TABLET ORAL at 10:12

## 2024-08-29 RX ADMIN — POLYETHYLENE GLYCOL 3350 17 G: 17 POWDER, FOR SOLUTION ORAL at 10:12

## 2024-08-29 RX ADMIN — IBUPROFEN 400 MG: 400 TABLET ORAL at 00:48

## 2024-08-29 NOTE — PLAN OF CARE
Goal Outcome Evaluation:  Plan of Care Reviewed With: patient        Progress: improving  Outcome Evaluation: medicated for head and back pain today, no fever noted today, changed antibiotics to rocephin from meropenem, urine cultures resulted today, no other needs noted or voiced, will continue with plan of care

## 2024-08-29 NOTE — PROGRESS NOTES
[facility]   Hospitalist Progress Note  Date: 2024  Patient Name: Neha May  : 1985  MRN: [id number]  Date of admission: 2024      Subjective   Subjective     Chief Complaint: Left flank pain    Summary:  39 y.o. female  with past medical history of hypertension, GERD, recently diagnosed left ureteral stone at the [facility] status post laser lithotripsy basket stone extraction, left ureteral stent insertion on 2024 by Dr. Darby presented to the ED for evaluation of left flank pain.  Since the procedure patient noted to have left flank pain that did not improve much.  Patient has pulled out the ureteral stent as instructed around 10 PM on 2024 and yesterday morning after going to the work patient started noticing fevers and worsening left flank pain and has come to the ED for further evaluation.  Upon to be febrile with concern for UTI on urinalysis.  CT abdomen pelvis with contrast showed interval removal of the left ureteral stent with mild to moderate left-sided hydronephrosis and suspected urinary transition zone at the [facility].  She was admitted for further care, started on IV antibiotics, urology was consulted.  She also tested positive for COVID but had minimal symptoms and did not require oxygen.    Interval Followup: Still spiking fevers to 102.  She feels like her flank pain is slowly improving.  Ambulating about the room independently.  Denies nasal congestion, cough, shortness of breath.    Objective   Objective     Vitals:   Temp:  [98.6 °F (37 °C)-102.2 °F (39 °C)] 98.6 °F (37 °C)  Heart Rate:  [] 83  Resp:  [18-20] 20  BP: (115-128)/(68-82) 121/75  Physical Exam    Constitutional: Awake, alert, no acute distress   Respiratory: Clear to auscultation bilaterally, nonlabored respirations    Cardiovascular: RRR, no MRG   Gastrointestinal: Positive bowel sounds, soft, nontender, nondistended   Neurologic: Oriented x 3, strength symmetric in all extremities, Cranial  Nerves grossly intact to confrontation, speech clear    Result Review    I have personally reviewed the results below:  [x]  Laboratory personally reviewed BMP, CBC, magnesium, phosphorus, urine culture  []  Microbiology  []  Radiology  []  EKG/Telemetry   []  Cardiology/Vascular   []  Pathology  []  Old records  []  Other:  CBC          8/26/2024    21:46 8/28/2024    04:49 8/29/2024    05:13   CBC   WBC 8.99  11.67  11.28    RBC 4.13  3.89  3.39    Hemoglobin 12.2  11.6  10.0    Hematocrit 37.3  35.6  30.5    MCV 90.3  91.5  90.0    MCH 29.5  29.8  29.5    MCHC 32.7  32.6  32.8    RDW 12.1  12.5  12.3    Platelets 239  224  225      CMP          8/26/2024    21:46 8/28/2024    04:49 8/29/2024    05:13   CMP   Glucose 96  112  140    BUN 8  8  7    Creatinine 0.74  0.69  0.64    EGFR 105.0  112.7  114.7    Sodium 134  134  134    Potassium 3.8  3.7  3.2    Chloride 101  101  102    Calcium 8.7  8.2  8.1    Total Protein 6.6      Albumin 3.4      Globulin 3.2      Total Bilirubin 0.3      Alkaline Phosphatase 62      AST (SGOT) 13      ALT (SGPT) 9      Albumin/Globulin Ratio 1.1      BUN/Creatinine Ratio 10.8  11.6  10.9    Anion Gap 10.5  10.4  10.0        Assessment & Plan   Assessment / Plan   Mild to moderate left-sided hydronephrosis  UTI due to unknown bacterial source  Questionable transient bacteremia  Recent left obstructive ureteral stone at the [facility] s/p lithotripsy and left ureteral stent placement on 8/22/2024, removed manually on 8/25  Left ureteritis/pyelitis  Sepsis due to the above, present on admission, fever, tachycardia  COVID-19 infection  Hypophosphatemia  Hypokalemia  Hypertension  CAD    Continue to monitor in the hospital for workup and management of the above  Urine culture growing Serratia, sensitivities reviewed, transition back to Rocephin based on sensitivities  DC IV fluids  Discussed with urology-no surgical intervention currently necessary  Continue Flomax  Tylenol or ibuprofen as  needed for fevers  Antiemetics as needed  Continue regular diet  DC telemetry  Continue appropriate home medications  Plan to keep patient in the hospital until she is fever free for 24 hours.  Would likely benefit from prolonged (10-14 day) course of antibiotics  Replace phosphorus and potassium orally and IV  Trend renal function and electrolytes with a.m. BMP, magnesium   Trend Hgb and WBC with a.m. CBC     Discussed plan with RN, urology    VTE Prophylaxis:  Mechanical VTE prophylaxis orders are present.        CODE STATUS:   Level Of Support Discussed With: Patient  Code Status (Patient has no pulse and is not breathing): CPR (Attempt to Resuscitate)  Medical Interventions (Patient has pulse or is breathing): Full Support

## 2024-08-29 NOTE — PROGRESS NOTES
[facility]   Urology Progress Note    Patient Name: Neha May  : 1985  MRN: [id number]  Primary Care Physician:  Rachael Waters APRN  Date of admission: 2024    Subjective   Subjective     Still spiked fever overnight.  Afebrile currently    flank pain improving    No chest pain or shortness of air    Ambulating            Objective   Objective     Vitals:   Temp:  [98.8 °F (37.1 °C)-102.2 °F (39 °C)] 98.8 °F (37.1 °C)  Heart Rate:  [] 76  Resp:  [18-20] 20  BP: (113-128)/(68-82) 119/82  Physical Exam     Alert and oriented x3  No acute distress  Unlabored respirations  Nontender/nondistended       Result Review    Result Review:  I have personally reviewed the results from the time of this admission to 2024 10:26 EDT and agree with these findings:  []  Laboratory  []  Microbiology  []  Radiology  []  EKG/Telemetry   []  Cardiology/Vascular   []  Pathology  []  Old records  []  Other:      Assessment & Plan   Assessment / Plan     Brief Patient Summary:  Neha May is a 39 y.o. female     Active Hospital Problems:  Active Hospital Problems    Diagnosis     **Hydronephrosis of left kidney          Left flank pain  Fever  UTI  Complicated UTI with Serratia    COVID           No surgical intervention planned.       Will follow    consider full 2 weeks of antibiotics at discharge    Appreciate hospitalist help with this patient

## 2024-08-30 ENCOUNTER — READMISSION MANAGEMENT (OUTPATIENT)
Dept: CALL CENTER | Facility: HOSPITAL | Age: 39
End: 2024-08-30
Payer: COMMERCIAL

## 2024-08-30 VITALS
TEMPERATURE: 98.3 F | OXYGEN SATURATION: 98 % | SYSTOLIC BLOOD PRESSURE: 125 MMHG | HEIGHT: 59 IN | WEIGHT: 197.31 LBS | DIASTOLIC BLOOD PRESSURE: 88 MMHG | BODY MASS INDEX: 39.78 KG/M2 | RESPIRATION RATE: 18 BRPM | HEART RATE: 66 BPM

## 2024-08-30 LAB
ANION GAP SERPL CALCULATED.3IONS-SCNC: 10.6 MMOL/L (ref 5–15)
BASOPHILS # BLD AUTO: 0.03 10*3/MM3 (ref 0–0.2)
BASOPHILS NFR BLD AUTO: 0.4 % (ref 0–1.5)
BUN SERPL-MCNC: 5 MG/DL (ref 6–20)
BUN/CREAT SERPL: 8.1 (ref 7–25)
CALCIUM SPEC-SCNC: 8.6 MG/DL (ref 8.6–10.5)
CHLORIDE SERPL-SCNC: 103 MMOL/L (ref 98–107)
CO2 SERPL-SCNC: 22.4 MMOL/L (ref 22–29)
CREAT SERPL-MCNC: 0.62 MG/DL (ref 0.57–1)
DEPRECATED RDW RBC AUTO: 41.5 FL (ref 37–54)
EGFRCR SERPLBLD CKD-EPI 2021: 115.6 ML/MIN/1.73
EOSINOPHIL # BLD AUTO: 0.19 10*3/MM3 (ref 0–0.4)
EOSINOPHIL NFR BLD AUTO: 2.3 % (ref 0.3–6.2)
ERYTHROCYTE [DISTWIDTH] IN BLOOD BY AUTOMATED COUNT: 12.7 % (ref 12.3–15.4)
GLUCOSE SERPL-MCNC: 110 MG/DL (ref 65–99)
HCT VFR BLD AUTO: 29.4 % (ref 34–46.6)
HGB BLD-MCNC: 9.8 G/DL (ref 12–15.9)
IMM GRANULOCYTES # BLD AUTO: 0.03 10*3/MM3 (ref 0–0.05)
IMM GRANULOCYTES NFR BLD AUTO: 0.4 % (ref 0–0.5)
LYMPHOCYTES # BLD AUTO: 2.2 10*3/MM3 (ref 0.7–3.1)
LYMPHOCYTES NFR BLD AUTO: 26.8 % (ref 19.6–45.3)
MAGNESIUM SERPL-MCNC: 2 MG/DL (ref 1.6–2.6)
MCH RBC QN AUTO: 29.6 PG (ref 26.6–33)
MCHC RBC AUTO-ENTMCNC: 33.3 G/DL (ref 31.5–35.7)
MCV RBC AUTO: 88.8 FL (ref 79–97)
MONOCYTES # BLD AUTO: 0.86 10*3/MM3 (ref 0.1–0.9)
MONOCYTES NFR BLD AUTO: 10.5 % (ref 5–12)
NEUTROPHILS NFR BLD AUTO: 4.9 10*3/MM3 (ref 1.7–7)
NEUTROPHILS NFR BLD AUTO: 59.6 % (ref 42.7–76)
NRBC BLD AUTO-RTO: 0 /100 WBC (ref 0–0.2)
PHOSPHATE SERPL-MCNC: 2.9 MG/DL (ref 2.5–4.5)
PLATELET # BLD AUTO: 261 10*3/MM3 (ref 140–450)
PMV BLD AUTO: 10.1 FL (ref 6–12)
POTASSIUM SERPL-SCNC: 3.5 MMOL/L (ref 3.5–5.2)
RBC # BLD AUTO: 3.31 10*6/MM3 (ref 3.77–5.28)
SODIUM SERPL-SCNC: 136 MMOL/L (ref 136–145)
WBC NRBC COR # BLD AUTO: 8.21 10*3/MM3 (ref 3.4–10.8)

## 2024-08-30 PROCEDURE — 83735 ASSAY OF MAGNESIUM: CPT | Performed by: INTERNAL MEDICINE

## 2024-08-30 PROCEDURE — 99239 HOSP IP/OBS DSCHRG MGMT >30: CPT | Performed by: INTERNAL MEDICINE

## 2024-08-30 PROCEDURE — 80048 BASIC METABOLIC PNL TOTAL CA: CPT | Performed by: INTERNAL MEDICINE

## 2024-08-30 PROCEDURE — 99231 SBSQ HOSP IP/OBS SF/LOW 25: CPT | Performed by: UROLOGY

## 2024-08-30 PROCEDURE — 84100 ASSAY OF PHOSPHORUS: CPT | Performed by: INTERNAL MEDICINE

## 2024-08-30 PROCEDURE — 85025 COMPLETE CBC W/AUTO DIFF WBC: CPT | Performed by: INTERNAL MEDICINE

## 2024-08-30 RX ORDER — TAMSULOSIN HYDROCHLORIDE 0.4 MG/1
0.4 CAPSULE ORAL DAILY
Qty: 30 CAPSULE | Refills: 0 | Status: SHIPPED | OUTPATIENT
Start: 2024-08-31

## 2024-08-30 RX ORDER — KETOROLAC TROMETHAMINE 10 MG/1
10 TABLET, FILM COATED ORAL EVERY 6 HOURS PRN
Qty: 15 TABLET | Refills: 0 | Status: SHIPPED | OUTPATIENT
Start: 2024-08-30

## 2024-08-30 RX ORDER — ONDANSETRON 4 MG/1
4 TABLET, ORALLY DISINTEGRATING ORAL 4 TIMES DAILY PRN
Qty: 9 TABLET | Refills: 0 | Status: SHIPPED | OUTPATIENT
Start: 2024-08-30

## 2024-08-30 RX ORDER — CEFDINIR 300 MG/1
300 CAPSULE ORAL 2 TIMES DAILY
Qty: 22 CAPSULE | Refills: 0 | Status: SHIPPED | OUTPATIENT
Start: 2024-08-30 | End: 2024-09-10

## 2024-08-30 RX ORDER — HYDROCODONE BITARTRATE AND ACETAMINOPHEN 5; 325 MG/1; MG/1
1 TABLET ORAL EVERY 6 HOURS PRN
Qty: 12 TABLET | Refills: 0 | Status: SHIPPED | OUTPATIENT
Start: 2024-08-30

## 2024-08-30 RX ADMIN — HYDROCODONE BITARTRATE AND ACETAMINOPHEN 1 TABLET: 5; 325 TABLET ORAL at 07:25

## 2024-08-30 RX ADMIN — PANTOPRAZOLE 20 MG: 20 TABLET, DELAYED RELEASE ORAL at 09:06

## 2024-08-30 RX ADMIN — TAMSULOSIN HYDROCHLORIDE 0.4 MG: 0.4 CAPSULE ORAL at 09:07

## 2024-08-30 RX ADMIN — HYDROCODONE BITARTRATE AND ACETAMINOPHEN 1 TABLET: 5; 325 TABLET ORAL at 03:16

## 2024-08-30 RX ADMIN — Medication 10 ML: at 09:07

## 2024-08-30 NOTE — OUTREACH NOTE
Prep Survey      Flowsheet Row Responses   Gnosticism facility patient discharged from? [facility]   Is LACE score < 7 ? No   Eligibility [facility]   Date of Admission 08/26/24   Date of Discharge 08/30/24   Discharge Disposition Home or Self Care   Discharge diagnosis Hydronephrosis of left kidney   Does the patient have one of the following disease processes/diagnoses(primary or secondary)? Other   Does the patient have Home health ordered? No   Is there a DME ordered? No   Prep survey completed? Yes            YOSELIN PATEL - Registered Nurse

## 2024-08-30 NOTE — PLAN OF CARE
Goal Outcome Evaluation:  Plan of Care Reviewed With: patient        Progress: no change  Outcome Evaluation: aox4, had pain medication once this shift. family at bedside. vss.

## 2024-08-30 NOTE — PROGRESS NOTES
[facility]   Urology Progress Note    Patient Name: Neha May  : 1985  MRN: [id number]  Primary Care Physician:  Rachael Waters APRN  Date of admission: 2024    Subjective   Subjective       Afebrile last 24 hours  Feeling better  Pain improved            Objective   Objective     Vitals:   Temp:  [98.6 °F (37 °C)-99.1 °F (37.3 °C)] 99.1 °F (37.3 °C)  Heart Rate:  [71-90] 87  Resp:  [18-20] 18  BP: (109-134)/(61-82) 134/79  Physical Exam     Alert and oriented x3  No acute distress  Unlabored respirations  Nontender/nondistended       Result Review    Result Review:  I have personally reviewed the results from the time of this admission to 2024 06:39 EDT and agree with these findings:  []  Laboratory  []  Microbiology  []  Radiology  []  EKG/Telemetry   []  Cardiology/Vascular   []  Pathology  []  Old records  []  Other:      Assessment & Plan   Assessment / Plan     Brief Patient Summary:  Neha May is a 39 y.o. female     Active Hospital Problems:  Active Hospital Problems    Diagnosis     **Hydronephrosis of left kidney          Left flank pain  Fever  UTI  Complicated UTI with Serratia    COVID            consider full 2 weeks of antibiotics at discharge    Appreciate hospitalist help with this patient    Call with questions    Patient already has follow-up with me in a few weeks

## 2024-08-30 NOTE — DISCHARGE SUMMARY
[facility]         HOSPITALIST  DISCHARGE SUMMARY    Patient Name: Neha May  : 1985  MRN: [id number]    Date of Admission: 2024  Date of Discharge: 2024  Primary Care Physician: Rachael Waters APRN    Consults       Date and Time Order Name Status Description    2024  2:25 AM Inpatient Hospitalist Consult      2024  2:20 AM Inpatient Hospitalist Consult      2024  2:11 AM Inpatient Urology Consult              Active and Resolved Hospital Problems:  Active Hospital Problems    Diagnosis POA    **Hydronephrosis of left kidney [N13.30] Yes      Resolved Hospital Problems   No resolved problems to display.   Mild to moderate left-sided hydronephrosis  Serratia UTI  Questionable transient bacteremia  Recent left obstructive ureteral stone at the [facility] s/p lithotripsy and left ureteral stent placement on 2024, removed manually on   Left ureteritis/pyelitis  Sepsis due to the above, present on admission, fever, tachycardia  COVID-19 infection  Hypophosphatemia  Hypokalemia  Hypertension  CAD    Hospital Course     Hospital Course:  Neha May is a 39 y.o. female with past medical history of hypertension, GERD, recently diagnosed left ureteral stone at the [facility] status post laser lithotripsy basket stone extraction, left ureteral stent insertion on 2024 by Dr. Darby presented to the ED for evaluation of left flank pain. Since the procedure patient noted to have left flank pain that did not improve much. Patient has pulled out the ureteral stent as instructed around 10 PM on 2024 and yesterday morning after going to the work patient started noticing fevers and worsening left flank pain and has come to the ED for further evaluation. Upon to be febrile with concern for UTI on urinalysis. CT abdomen pelvis with contrast showed interval removal of the left ureteral stent with mild to moderate left-sided hydronephrosis and suspected  urinary transition zone at the J. She was admitted for further care, started on IV antibiotics, urology was consulted.  Urology did not feel patient required any surgical intervention.  She also tested positive for COVID but had minimal symptoms and did not require oxygen.  Urine culture grew Serratia which did have resistances but was susceptible to Rocephin.  She did improve and went 24 hours without a fever.  She was transition to oral cefdinir and will complete a total of 14 days of antibiotic therapy per urology recommendations.  She was discharged home in stable condition on 8/30/2024.  Recommend follow-up with PCP within 1 week, urology as previously scheduled.     Day of Discharge     Vital Signs:  Temp:  [98.3 °F (36.8 °C)-99.1 °F (37.3 °C)] 98.3 °F (36.8 °C)  Heart Rate:  [66-90] 66  Resp:  [18-20] 18  BP: (109-134)/(61-88) 125/88  Physical Exam:   Gen: NAD, WDWN  ENT: PERRL, EOMI   CV: RRR no MRG  Pulm: CTAB, no w/r/r  GI: Abd soft, NTND, +bs  Neuro: Moving all extremities spontaneously, CN II-XII grossly intact   Psych: A&O*3, normal mood and affect  Skin: No lesions or rashes noted    Discharge Details        Discharge Medications        New Medications        Instructions Start Date   cefdinir 300 MG capsule  Commonly known as: OMNICEF   300 mg, Oral, 2 Times Daily      tamsulosin 0.4 MG capsule 24 hr capsule  Commonly known as: FLOMAX   0.4 mg, Oral, Daily   Start Date: August 31, 2024            Changes to Medications        Instructions Start Date   lisinopril 10 MG tablet  Commonly known as: PRINIVIL,ZESTRIL  What changed: additional instructions   TAKE 1 TABLET BY MOUTH ONCE DAILY FOR BLOOD PRESSURE             Continue These Medications        Instructions Start Date   HYDROcodone-acetaminophen 5-325 MG per tablet  Commonly known as: NORCO   1 tablet, Oral, Every 6 Hours PRN      ketorolac 10 MG tablet  Commonly known as: TORADOL   10 mg, Oral, Every 6 Hours PRN      ondansetron ODT 4 MG  disintegrating tablet  Commonly known as: ZOFRAN-ODT   4 mg, Translingual, 4 Times Daily PRN      pantoprazole 20 MG EC tablet  Commonly known as: PROTONIX   20 mg, Oral, Daily      vitamin D 1.25 MG (63213 UT) capsule capsule  Commonly known as: ERGOCALCIFEROL   50,000 Units, Oral, Weekly             Stop These Medications      oxyCODONE-acetaminophen 5-325 MG per tablet  Commonly known as: PERCOCET              No Known Allergies    Discharge Disposition:  Home or Self Care    Diet:  Hospital:  Diet Order   Procedures    Diet: Regular/House; Fluid Consistency: Thin (IDDSI 0)       Discharge Activity:   Activity Instructions       Activity as Tolerated              CODE STATUS:  Code Status and Medical Interventions: CPR (Attempt to Resuscitate); Full Support   Ordered at: 08/27/24 0313     Level Of Support Discussed With:    Patient     Code Status (Patient has no pulse and is not breathing):    CPR (Attempt to Resuscitate)     Medical Interventions (Patient has pulse or is breathing):    Full Support         Future Appointments   Date Time Provider Department Center   9/22/2024  9:00 AM [address]   9/27/2024  8:30 AM Jose Darby MD [facility] ETRING [facility]   10/1/2024  8:45 AM Rachel Bradford APRJONATHON [facility]TR[facility]   11/27/2024 11:15 AM Gigi Reynolds APRJONATHON [facility] OBG WTPT [facility]   6/11/2025 10:00 AM Jess Singleton APRJONATHON [facility] GE ETWH [facility]       Additional Instructions for the Follow-ups that You Need to Schedule       Discharge Follow-up with PCP   As directed       Currently Documented PCP:    Rachael Waters APRN    PCP Phone Number:    [phone]     Follow Up Details: 3-5 days                Pertinent  and/or Most Recent Results     PROCEDURES:   None    LAB RESULTS:      Lab 08/30/24  0451 08/29/24  0513 08/28/24  0449 08/26/24  2146   WBC 8.21 11.28* 11.67* 8.99   HEMOGLOBIN 9.8* 10.0* 11.6* 12.2   HEMATOCRIT 29.4* 30.5* 35.6 37.3   PLATELETS 261 225 224 239    NEUTROS ABS 4.90 7.57* 8.91* 7.41*   IMMATURE GRANS (ABS) 0.03 0.06* 0.06* 0.03   LYMPHS ABS 2.20 2.14 1.49 0.80   MONOS ABS 0.86 1.31* 1.12* 0.62   EOS ABS 0.19 0.15 0.05 0.10   MCV 88.8 90.0 91.5 90.3   LACTATE  --   --   --  0.9         Lab 08/30/24  0451 08/29/24  0513 08/28/24  0449 08/26/24  2146   SODIUM 136 134* 134* 134*   POTASSIUM 3.5 3.2* 3.7 3.8   CHLORIDE 103 102 101 101   CO2 22.4 22.0 22.6 22.5   ANION GAP 10.6 10.0 10.4 10.5   BUN 5* 7 8 8   CREATININE 0.62 0.64 0.69 0.74   EGFR 115.6 114.7 112.7 105.0   GLUCOSE 110* 140* 112* 96   CALCIUM 8.6 8.1* 8.2* 8.7   MAGNESIUM 2.0 1.7 1.8  --    PHOSPHORUS 2.9 2.0* 2.7  --          Lab 08/26/24  2146   TOTAL PROTEIN 6.6   ALBUMIN 3.4*   GLOBULIN 3.2   ALT (SGPT) 9   AST (SGOT) 13   BILIRUBIN 0.3   ALK PHOS 62                     Brief Urine Lab Results  (Last result in the past 365 days)        Color   Clarity   Blood   Leuk Est   Nitrite   Protein   CREAT   Urine HCG        08/27/24 0032 Yellow   Cloudy   Small (1+)   Large (3+)   Negative   Trace                 Microbiology Results (last 10 days)       Procedure Component Value - Date/Time    Urine Culture - Urine, Urine, Clean Catch [[id number]]  (Abnormal)  (Susceptibility) Collected: 08/27/24 0032    Lab Status: Final result Specimen: Urine, Clean Catch Updated: 08/29/24 1012     Urine Culture >100,000 CFU/mL Serratia marcescens    Narrative:      Colonization of the urinary tract without infection is common. Treatment is discouraged unless the patient is symptomatic, pregnant, or undergoing an invasive urologic procedure.    Susceptibility        Serratia marcescens      DEVIN      Amoxicillin + Clavulanate Resistant      Cefazolin Resistant      Cefepime Susceptible      Ceftazidime Susceptible      Ceftriaxone Susceptible      Gentamicin Susceptible      Levofloxacin Susceptible      Nitrofurantoin Resistant      Piperacillin + Tazobactam Susceptible      Trimethoprim + Sulfamethoxazole Susceptible                            Blood Culture - Blood, Arm, Left [[id number]]  (Normal) Collected: 08/26/24 2340    Lab Status: Preliminary result Specimen: Blood from Arm, Left Updated: 08/30/24 0030     Blood Culture No growth at 3 days    COVID-19, FLU A/B, RSV PCR 1 HR TAT - Swab, Nasopharynx [[id number]]  (Abnormal) Collected: 08/26/24 2326    Lab Status: Final result Specimen: Swab from Nasopharynx Updated: 08/27/24 0037     COVID19 Detected     Influenza A PCR Not Detected     Influenza B PCR Not Detected     RSV, PCR Not Detected    Narrative:      Fact sheet for providers: https://www.fda.gov/media/486692/download    Fact sheet for patients: https://www.fda.gov/media/033033/download    Test performed by PCR.    Blood Culture - Blood, Arm, Right [[id number]]  (Normal) Collected: 08/26/24 2146    Lab Status: Preliminary result Specimen: Blood from Arm, Right Updated: 08/29/24 2200     Blood Culture No growth at 3 days            CT Abdomen Pelvis With Contrast    Result Date: 8/27/2024   1.  There has been interval removal of the left ureteral stent since the prior 8/22/2024 intraoperative study.  2.  Mild-to-moderate left-sided hydronephrosis is seen with a suspected relative transition zone at about the ureteropelvic junction (UPJ). A proximal left ureteral stricture is possible.  3.  No obstructing ureteral calculus is appreciated.  4.  There may be age-indeterminate infectious/inflammatory left-sided pyelitis/ureteritis.  5.  Probably no left-sided pyelonephritis.  6.  Age-indeterminate infectious/inflammatory cystitis cannot be excluded.  7.  New tiny bilateral pleural effusions are noted.  8.  Otherwise, no acute findings are appreciated.   Portions of this note were completed with a voice recognition program.   Electronically Signed By-John Mitchell MD On:8/27/2024 12:50 AM        Results for orders placed during the hospital encounter of 12/03/23    Duplex Venous Lower Extremity - Left CAR    Interpretation  Summary    Normal left lower extremity venous duplex scan.      Results for orders placed during the hospital encounter of 12/03/23    Duplex Venous Lower Extremity - Left CAR    Interpretation Summary    Normal left lower extremity venous duplex scan.          Labs Pending at Discharge:  Pending Labs       Order Current Status    Blood Culture - Blood, Arm, Left Preliminary result    Blood Culture - Blood, Arm, Right Preliminary result              Time spent on Discharge including face to face service: 34 minutes    Electronically signed by Stone Vasques MD, 08/30/24, 9:52 AM EDT.

## 2024-08-30 NOTE — PLAN OF CARE
Goal Outcome Evaluation:  Plan of Care Reviewed With: patient, mother        Progress: improving  Outcome Evaluation: Patient A&Ox4. Complaint of pain treated per MAR. IV removed tip intact per order. Discharge education, instructions, follow up appointments and medications discussed with patient. Patient verbalized understanding. Patient taken out in wheel chair to private vehicle.

## 2024-08-31 LAB — BACTERIA SPEC AEROBE CULT: NORMAL

## 2024-09-01 LAB — BACTERIA SPEC AEROBE CULT: NORMAL

## 2024-09-03 ENCOUNTER — TRANSITIONAL CARE MANAGEMENT TELEPHONE ENCOUNTER (OUTPATIENT)
Dept: CALL CENTER | Facility: HOSPITAL | Age: 39
End: 2024-09-03
Payer: COMMERCIAL

## 2024-09-03 NOTE — OUTREACH NOTE
Call Center TCM Note      Flowsheet Row Responses   [facility] patient discharged from? [facility]   Does the patient have one of the following disease processes/diagnoses(primary or secondary)? Other   TCM attempt successful? Yes   Call start time 1019   Call end time 1022   Discharge diagnosis Hydronephrosis of left kidney   Person spoke with today (if not patient) and relationship patient   Meds reviewed with patient/caregiver? Yes   Is the patient having any side effects they believe may be caused by any medication additions or changes? No   Does the patient have all medications ordered at discharge? Yes   Is the patient taking all medications as directed (includes completed medication regime)? Yes   Comments Patient has a hospital followup scheduled on 9/9/2024 with PCP   Does the patient have an appointment with their PCP within 7-14 days of discharge? Yes   Psychosocial issues? No   Did the patient receive a copy of their discharge instructions? Yes   Nursing interventions Reviewed instructions with patient   What is the patient's perception of their health status since discharge? Improving  [Is improving but still weak.]   Is the patient/caregiver able to teach back signs and symptoms related to disease process for when to call PCP? Yes   Is the patient/caregiver able to teach back signs and symptoms related to disease process for when to call 911? Yes   Is the patient/caregiver able to teach back the hierarchy of who to call/visit for symptoms/problems? PCP, Specialist, Home health nurse, Urgent Care, ED, 911 Yes   If the patient is a current smoker, are they able to teach back resources for cessation? Not a smoker   TCM call completed? Yes   Wrap up additional comments No needs or questions at this time.   Call end time 1022   Would this patient benefit from a Referral to Amb Social Work? No   Is the patient interested in additional calls from an ambulatory ? No            Kehinde Hughes,  RN    9/3/2024, 10:23 EDT

## 2024-09-03 NOTE — PROGRESS NOTES
"Enter Query Response Below      Query Response: UTI not related to ureteral stent    Electronically signed by Jose Darby MD, 24, 2:44 PM EDT.              If applicable, please update the problem list.       Patient: Neha May        : 1985  Account: [id number]           Admit Date: 2024        How to Respond to this query:       a. Click New Note     b. Answer query within the yellow box.                c. Update the Problem List, if applicable.      If you have any questions about this query contact me at: [email or web address]    ,    38 y/o female admitted on 2024 for sepsis and UTI.  Per H&P \" Mild to moderate left-sided hydronephrosis, Recent left obstructive ureteral stone at the [facility] s/p lithotripsy and left ureteral stent, placement on 2024, removed manually on , Left ureteritis/pyelitis\".  Urine culture resulted 2024: >100,000 CFU/ml Serratia marcescens.  Treatment included IV Rocephin 2024 - 2024, and Meropenem 2024 - 2024.  Per Discharge Summary \"She was transition to oral cefdinir and will complete a total of 14 days of antibiotic therapy per urology recommendations\".    Please clarify the etiology of the UTI:    -UTI due to left ureteral stent  -UTI not due to left ureteral stent  -Other- specify_______    By submitting this query, we are merely seeking further clarification of documentation to accurately reflect all conditions that you are monitoring, evaluating, treating or that extend the hospitalization or utilize additional resources of care. Please utilize your independent clinical judgment when addressing the question(s) above.     This query and your response, once completed, will be entered into the legal medical record.    Sincerely,  Ashley Ledesma  Clinical Documentation Integrity Program     "

## 2024-09-06 LAB
CALCIUM OXALATE DIHYDRATE MFR STONE IR: 50 %
COLOR STONE: NORMAL
COM MFR STONE: 45 %
COMPN STONE: NORMAL
HYDROXYAPATITE: 5 %
LABORATORY COMMENT REPORT: NORMAL
LABORATORY COMMENT REPORT: NORMAL
Lab: NORMAL
Lab: NORMAL
PHOTO: NORMAL
SIZE STONE: NORMAL MM
SPEC SOURCE SUBJ: NORMAL
STONE ANALYSIS-IMP: NORMAL
WT STONE: 25 MG

## 2024-09-22 ENCOUNTER — HOSPITAL ENCOUNTER (OUTPATIENT)
Dept: ULTRASOUND IMAGING | Facility: HOSPITAL | Age: 39
Discharge: HOME | End: 2024-09-22
Admitting: UROLOGY
Payer: COMMERCIAL

## 2024-09-22 DIAGNOSIS — N20.1 LEFT URETERAL STONE: ICD-10-CM

## 2024-09-22 PROCEDURE — 76775 US EXAM ABDO BACK WALL LIM: CPT

## 2024-09-23 PROBLEM — N20.0 NEPHROLITHIASIS: Status: ACTIVE | Noted: 2024-09-23

## 2024-09-27 ENCOUNTER — OFFICE VISIT (OUTPATIENT)
Dept: UROLOGY | Facility: CLINIC | Age: 39
End: 2024-09-27
Payer: COMMERCIAL

## 2024-09-27 ENCOUNTER — TELEPHONE (OUTPATIENT)
Dept: UROLOGY | Facility: CLINIC | Age: 39
End: 2024-09-27
Payer: COMMERCIAL

## 2024-09-27 VITALS — WEIGHT: 190.8 LBS | HEIGHT: 59 IN | BODY MASS INDEX: 38.47 KG/M2 | RESPIRATION RATE: 18 BRPM

## 2024-09-27 DIAGNOSIS — N20.0 NEPHROLITHIASIS: Primary | ICD-10-CM

## 2024-10-01 ENCOUNTER — TELEPHONE (OUTPATIENT)
Dept: UROLOGY | Facility: CLINIC | Age: 39
End: 2024-10-01

## 2024-10-01 NOTE — TELEPHONE ENCOUNTER
CALLED PT TO OFFER R/S OF CX'D APPT 10/1 W/ HUSSEIN    SOMEONE ANSWERED AND HUNG UP -     PT JUST HAD POST OP APPT MIKE/ LAW 9/27, DOES PT NEED TO BE R/S?

## 2024-10-03 RX ORDER — LISINOPRIL 10 MG/1
10 TABLET ORAL DAILY
Qty: 90 TABLET | Refills: 1 | Status: SHIPPED | OUTPATIENT
Start: 2024-10-03

## 2024-11-05 ENCOUNTER — OFFICE VISIT (OUTPATIENT)
Dept: FAMILY MEDICINE CLINIC | Facility: CLINIC | Age: 39
End: 2024-11-05
Payer: COMMERCIAL

## 2024-11-05 VITALS
WEIGHT: 198 LBS | SYSTOLIC BLOOD PRESSURE: 111 MMHG | RESPIRATION RATE: 18 BRPM | HEART RATE: 80 BPM | OXYGEN SATURATION: 98 % | DIASTOLIC BLOOD PRESSURE: 73 MMHG | TEMPERATURE: 98.2 F | HEIGHT: 59 IN | BODY MASS INDEX: 39.92 KG/M2

## 2024-11-05 DIAGNOSIS — K21.00 GASTROESOPHAGEAL REFLUX DISEASE WITH ESOPHAGITIS WITHOUT HEMORRHAGE: ICD-10-CM

## 2024-11-05 DIAGNOSIS — E66.812 CLASS 2 SEVERE OBESITY DUE TO EXCESS CALORIES WITH SERIOUS COMORBIDITY IN ADULT, UNSPECIFIED BMI: ICD-10-CM

## 2024-11-05 DIAGNOSIS — Z13.6 ENCOUNTER FOR SCREENING FOR VASCULAR DISEASE: Primary | ICD-10-CM

## 2024-11-05 DIAGNOSIS — I10 BENIGN ESSENTIAL HTN: Chronic | ICD-10-CM

## 2024-11-05 DIAGNOSIS — E66.01 CLASS 2 SEVERE OBESITY DUE TO EXCESS CALORIES WITH SERIOUS COMORBIDITY IN ADULT, UNSPECIFIED BMI: ICD-10-CM

## 2024-11-05 PROCEDURE — 99214 OFFICE O/P EST MOD 30 MIN: CPT | Performed by: NURSE PRACTITIONER

## 2024-11-05 NOTE — ASSESSMENT & PLAN NOTE
Discussed the screenings involved such as AAA, carotid doppler, SAMANTHA testing. Advised we can refer her for testing, but if that is not approved may order here.

## 2024-11-05 NOTE — PATIENT INSTRUCTIONS
Obesity, Adult  Obesity is having too much body fat. Being obese means that your weight is more than what is healthy for you.   BMI (body mass index) is a number that explains how much body fat you have. If you have a BMI of 30 or more, you are obese.  Obesity can cause serious health problems, such as:  Stroke.  Coronary artery disease (CAD).  Type 2 diabetes.  Some types of cancer.  High blood pressure (hypertension).  High cholesterol.  Gallbladder stones.  Obesity can also contribute to:  Osteoarthritis.  Sleep apnea.  Infertility problems.  What are the causes?  Eating meals each day that are high in calories, sugar, and fat.  Drinking a lot of drinks that have sugar in them.  Being born with genes that may make you more likely to become obese.  Having a medical condition that causes obesity.  Taking certain medicines.  Sitting a lot (having a sedentary lifestyle).  Not getting enough sleep.  What increases the risk?  Having a family history of obesity.  Living in an area with limited access to:  [place], recreation centers, or sidewalks.  Healthy food choices, such as grocery stores and farmers' markets.  What are the signs or symptoms?  The main sign is having too much body fat.  How is this treated?  Treatment for this condition often includes changing your lifestyle. Treatment may include:  Changing your diet. This may include making a healthy meal plan.  Exercise. This may include activity that causes your heart to beat faster (aerobic exercise) and strength training. Work with your doctor to design a program that works for you.  Medicine to help you lose weight. This may be used if you are not able to lose one pound a week after 6 weeks of healthy eating and more exercise.  Treating conditions that cause the obesity.  Surgery. Options may include gastric banding and gastric bypass. This may be done if:  Other treatments have not helped to improve your condition.  You have a BMI of 40 or higher.  You have  life-threatening health problems related to obesity.  Follow these instructions at home:  Eating and drinking    Follow advice from your doctor about what to eat and drink. Your doctor may tell you to:  Limit fast food, sweets, and processed snack foods.  Choose low-fat options. For example, choose low-fat milk instead of whole milk.  Eat five or more servings of fruits or vegetables each day.  Eat at home more often. This gives you more control over what you eat.  Choose healthy foods when you eat out.  Learn to read food labels. This will help you learn how much food is in one serving.  Keep low-fat snacks available.  Avoid drinks that have a lot of sugar in them. These include soda, fruit juice, iced tea with sugar, and flavored milk.  Drink enough water to keep your pee (urine) pale yellow.  Do not go on fad diets.  Physical activity  Exercise often, as told by your doctor. Most adults should get up to 150 minutes of moderate-intensity exercise every week.Ask your doctor:  What types of exercise are safe for you.  How often you should exercise.  Warm up and stretch before being active.  Do slow stretching after being active (cool down).  Rest between times of being active.  Lifestyle  Work with your doctor and a food expert (dietitian) to set a weight-loss goal that is best for you.  Limit your screen time.  Find ways to reward yourself that do not involve food.  Do not drink alcohol if:  Your doctor tells you not to drink.  You are pregnant, may be pregnant, or are planning to become pregnant.  If you drink alcohol:  Limit how much you have to:  0-1 drink a day for women.  0-2 drinks a day for men.  Know how much alcohol is in your drink. In the [place] one drink equals one 12 oz bottle of beer (355 mL), one 5 oz glass of wine (148 mL), or one 1½ oz glass of hard liquor (44 mL).  General instructions  Keep a weight-loss journal. This can help you keep track of:  The food that you eat.  How much exercise you  get.  Take over-the-counter and prescription medicines only as told by your doctor.  Take vitamins and supplements only as told by your doctor.  Think about joining a support group.  Pay attention to your mental health as obesity can lead to depression or self esteem issues.  Keep all follow-up visits.  Contact a doctor if:  You cannot meet your weight-loss goal after you have changed your diet and lifestyle for 6 weeks.  You are having trouble breathing.  Summary  Obesity is having too much body fat.  Being obese means that your weight is more than what is healthy for you.  Work with your doctor to set a weight-loss goal.  Get regular exercise as told by your doctor.  This information is not intended to replace advice given to you by your health care provider. Make sure you discuss any questions you have with your health care provider.  Document Revised: 07/26/2022 Document Reviewed: 07/26/2022  Elsevier Patient Education © 2024 [employer]

## 2024-11-05 NOTE — PROGRESS NOTES
Chief Complaint     Obesity (Wants to discuss weight loss opitons.  ) and Poor Circulation (Tingling in lower extremities.)    History of Present Illness     Neha May is a 39 y.o. female who presents to [facility] for evaluation of requesting vascular screenings. States she would like to go to vascular center if possible. Denies chest pain, SOB, abd pain, leg pain, warmth, swelling, dizziness, or other      Pt would like to try re-ordering zepbound for weight loss. States she was unable to get in the past d/t supply issues.      History      Past Medical History:   Diagnosis Date    Colon polyp     Have had colonoscopy bc of family histoey - polyps seen - no sognoficant fondings    Foot pain, left     GERD (gastroesophageal reflux disease)     Heel pain     Hypertension     Take a low dose of kedication daily    Numbness in feet        Past Surgical History:   Procedure Laterality Date     SECTION      COLONOSCOPY      COLONOSCOPY N/A 5/3/2024    Procedure: COLONOSCOPY WITH COLD SNARE POLYPECTOMY;  Surgeon: Amie Mcclure MD;  Location: [facility] ENDOSCOPY;  Service: Gastroenterology;  Laterality: N/A;  COLON POLYP, DIVERTICULOSIS    ENDOSCOPY N/A 5/3/2024    Procedure: ESOPHAGOGASTRODUODENOSCOPY WITH BIOPSIES AND 15-18MM BALLOON DILATATION;  Surgeon: Amie Mcclure MD;  Location: [facility] ENDOSCOPY;  Service: Gastroenterology;  Laterality: N/A;  SCHATZKI'S RING, HIATAL HERNIA    TUBAL ABDOMINAL LIGATION      UPPER GASTROINTESTINAL ENDOSCOPY      URETEROSCOPY LASER LITHOTRIPSY WITH STENT INSERTION Left 2024    Procedure: LEFT URETEROSCOPY LASER LITHOTRIPSY WITH STENT INSERTION;  Surgeon: Jose Darby MD;  Location: [facility] MAIN OR;  Service: Urology;  Laterality: Left;       Family History   Problem Relation Age of Onset    Hypertension Mother     Diabetes Mother     Hypertension Brother     Colon cancer Maternal Grandmother        "  45s    Melanoma Neg Hx     Uterine cancer Neg Hx     Ovarian cancer Neg Hx     Breast cancer Neg Hx     Prostate cancer Neg Hx     Deep vein thrombosis Neg Hx     Pulmonary embolism Neg Hx     Coronary artery disease Neg Hx     Stroke Neg Hx     Osteoporosis Neg Hx     Malig Hyperthermia Neg Hx         Current Medications        Current Outpatient Medications:     lisinopril (PRINIVIL,ZESTRIL) 10 MG tablet, Take 1 tablet by mouth Daily., Disp: 90 tablet, Rfl: 1    ondansetron ODT (ZOFRAN-ODT) 4 MG disintegrating tablet, Place 1 tablet on the tongue 4 (Four) Times a Day As Needed for Nausea or Vomiting., Disp: 9 tablet, Rfl: 0    pantoprazole (PROTONIX) 20 MG EC tablet, Take 1 tablet by mouth Daily., Disp: 90 tablet, Rfl: 3    tamsulosin (FLOMAX) 0.4 MG capsule 24 hr capsule, Take 1 capsule by mouth Daily., Disp: 30 capsule, Rfl: 0    vitamin D (ERGOCALCIFEROL) 1.25 MG (80671 UT) capsule capsule, Take 1 capsule by mouth 1 (One) Time Per Week., Disp: 12 capsule, Rfl: 1    Tirzepatide-Weight Management (ZEPBOUND) 2.5 MG/0.5ML solution auto-injector, Inject 0.5 mL under the skin into the appropriate area as directed 1 (One) Time Per Week., Disp: 2 mL, Rfl: 2     Allergies     No Known Allergies    Social History       Social History     Social History Narrative    Not on file       Immunizations     Immunization:  Immunization History   Administered Date(s) Administered    COVID-19 (MODERNA) 1st,2nd,3rd Dose Monovalent 02/05/2021, 03/05/2021    Flu Vaccine Quad PF >36MO 10/14/2021, 09/26/2022    Fluzone (or Fluarix & Flulaval for VFC) >6mos 02/08/2024    Influenza, Unspecified 09/27/2022    Td (TDVAX) 08/17/2009          Objective     Objective     Vital Signs:   /73 (BP Location: Left arm, Patient Position: Sitting, Cuff Size: Adult)   Pulse 80   Temp 98.2 °F (36.8 °C) (Temporal)   Resp 18   Ht 149.9 cm (59\")   Wt 89.8 kg (198 lb)   SpO2 98%   BMI 39.99 kg/m²       Physical Exam  Vitals reviewed. "   Constitutional:       General: She is not in acute distress.  HENT:      Head: Normocephalic.      Right Ear: Tympanic membrane normal.      Left Ear: Tympanic membrane normal.      Nose: Nose normal.      Mouth/Throat:      Pharynx: Oropharynx is clear. No posterior oropharyngeal erythema.   Eyes:      General: No scleral icterus.     Extraocular Movements: Extraocular movements intact.      Conjunctiva/sclera: Conjunctivae normal.      Pupils: Pupils are equal, round, and reactive to light.   Cardiovascular:      Rate and Rhythm: Normal rate and regular rhythm.      Pulses: Normal pulses.      Heart sounds: Normal heart sounds.   Pulmonary:      Effort: Pulmonary effort is normal.      Breath sounds: Normal breath sounds.   Abdominal:      General: Bowel sounds are normal.      Palpations: Abdomen is soft.   Musculoskeletal:         General: Normal range of motion.      Cervical back: Neck supple.   Skin:     General: Skin is warm and dry.   Neurological:      Mental Status: She is alert and oriented to person, place, and time.   Psychiatric:         Mood and Affect: Mood normal.         Behavior: Behavior normal.         Thought Content: Thought content normal.         Judgment: Judgment normal.         Results      Result Review :   The following data was reviewed by: VICKIE Burks on 11/05/2024:  Common labs          8/28/2024    04:49 8/29/2024    05:13 8/30/2024    04:51   Common Labs   Glucose 112  140  110    BUN 8  7  5    Creatinine 0.69  0.64  0.62    Sodium 134  134  136    Potassium 3.7  3.2  3.5    Chloride 101  102  103    Calcium 8.2  8.1  8.6    WBC 11.67  11.28  8.21    Hemoglobin 11.6  10.0  9.8    Hematocrit 35.6  30.5  29.4    Platelets 224  225  261      Consultant notes urology, ob/gyn        Assessment and Plan        Assessment and Plan    Diagnoses and all orders for this visit:    1. Encounter for screening for vascular disease (Primary)  Assessment & Plan:  Discussed the  screenings involved such as AAA, carotid doppler, SAMANTHA testing. Advised we can refer her for testing, but if that is not approved may order here.     Orders:  -     Ambulatory Referral to Vascular Surgery    2. Class 2 severe obesity due to excess calories with serious comorbidity in adult, unspecified BMI  Assessment & Plan:  Patient's (Body mass index is 39.99 kg/m².) indicates that they are obese (BMI >30) with health conditions that include hypertension . Weight is worsening. BMI  is above average; BMI management plan is completed. We discussed portion control, increasing exercise, and pharmacologic options including zepbound . No hx of thyroid tumor or cancer.        3. Benign essential HTN  Assessment & Plan:  Hypertension is improving with treatment.  Continue current treatment regimen.  Dietary sodium restriction.  Blood pressure will be reassessed at the next regular appointment.      4. Gastroesophageal reflux disease with esophagitis without hemorrhage  Assessment & Plan:  Controlled  Continue medications as directed   Discussed dietary and lifestyle modifications to control          Other orders  -     Tirzepatide-Weight Management (ZEPBOUND) 2.5 MG/0.5ML solution auto-injector; Inject 0.5 mL under the skin into the appropriate area as directed 1 (One) Time Per Week.  Dispense: 2 mL; Refill: 2              Follow Up        Follow Up   Return in about 3 months (around 2/5/2025), or if symptoms worsen or fail to improve.  Patient was given instructions and counseling regarding her condition or for health maintenance advice. Please see specific information pulled into the AVS if appropriate.

## 2024-11-05 NOTE — ASSESSMENT & PLAN NOTE
Patient's (Body mass index is 39.99 kg/m².) indicates that they are obese (BMI >30) with health conditions that include hypertension . Weight is worsening. BMI  is above average; BMI management plan is completed. We discussed portion control, increasing exercise, and pharmacologic options including zepbound . No hx of thyroid tumor or cancer.

## 2024-11-05 NOTE — ASSESSMENT & PLAN NOTE
Controlled  Continue medications as directed   Discussed dietary and lifestyle modifications to control

## 2024-11-07 ENCOUNTER — TRANSCRIBE ORDERS (OUTPATIENT)
Dept: ADMINISTRATIVE | Facility: HOSPITAL | Age: 39
End: 2024-11-07
Payer: COMMERCIAL

## 2024-11-07 DIAGNOSIS — Z13.6 ENCOUNTER FOR SCREENING FOR VASCULAR DISEASE: Primary | ICD-10-CM

## 2024-11-19 ENCOUNTER — HOSPITAL ENCOUNTER (OUTPATIENT)
Dept: CARDIOLOGY | Facility: HOSPITAL | Age: 39
Discharge: HOME | End: 2024-11-19
Admitting: NURSE PRACTITIONER

## 2024-11-19 DIAGNOSIS — Z13.6 ENCOUNTER FOR SCREENING FOR VASCULAR DISEASE: ICD-10-CM

## 2024-11-19 LAB
BH CV ECHO MEAS - DIST AO DIAM: 1.23 CM
BH CV VAS SCREENING CAROTID CCA LEFT: 88 CM/SEC
BH CV VAS SCREENING CAROTID CCA RIGHT: 94 CM/SEC
BH CV VAS SCREENING CAROTID ICA LEFT: 65 CM/SEC
BH CV VAS SCREENING CAROTID ICA RIGHT: 66 CM/SEC
BH CV XLRA MEAS - MID AO DIAM: 1.28 CM
BH CV XLRA MEAS - PAD LEFT ABI DP: 1.11
BH CV XLRA MEAS - PAD LEFT ABI PT: 1.16
BH CV XLRA MEAS - PAD LEFT ARM: 126 MMHG
BH CV XLRA MEAS - PAD LEFT LEG DP: 140 MMHG
BH CV XLRA MEAS - PAD LEFT LEG PT: 146 MMHG
BH CV XLRA MEAS - PAD RIGHT ABI DP: 1.11
BH CV XLRA MEAS - PAD RIGHT ABI PT: 1.08
BH CV XLRA MEAS - PAD RIGHT ARM: 124 MMHG
BH CV XLRA MEAS - PAD RIGHT LEG DP: 140 MMHG
BH CV XLRA MEAS - PAD RIGHT LEG PT: 136 MMHG
BH CV XLRA MEAS - PROX AO DIAM: 1.3 CM
BH CV XLRA MEAS LEFT ICA/CCA RATIO: 0.74
BH CV XLRA MEAS LEFT PROX ECA PSV: 49 CM/SEC
BH CV XLRA MEAS RIGHT ICA/CCA RATIO: 0.7
BH CV XLRA MEAS RIGHT PROX ECA PSV: 61 CM/SEC

## 2024-11-19 PROCEDURE — 93799 UNLISTED CV SVC/PROCEDURE: CPT

## 2025-01-10 ENCOUNTER — OFFICE VISIT (OUTPATIENT)
Dept: OBSTETRICS AND GYNECOLOGY | Facility: CLINIC | Age: 40
End: 2025-01-10
Payer: COMMERCIAL

## 2025-01-10 VITALS
DIASTOLIC BLOOD PRESSURE: 81 MMHG | SYSTOLIC BLOOD PRESSURE: 111 MMHG | WEIGHT: 194 LBS | BODY MASS INDEX: 39.18 KG/M2 | HEART RATE: 80 BPM

## 2025-01-10 DIAGNOSIS — Z01.419 ENCOUNTER FOR GYNECOLOGICAL EXAMINATION WITHOUT ABNORMAL FINDING: Primary | ICD-10-CM

## 2025-01-10 PROCEDURE — 87624 HPV HI-RISK TYP POOLED RSLT: CPT | Performed by: NURSE PRACTITIONER

## 2025-01-10 PROCEDURE — G0123 SCREEN CERV/VAG THIN LAYER: HCPCS | Performed by: NURSE PRACTITIONER

## 2025-01-10 NOTE — PROGRESS NOTES
Well Woman Visit    CC: Scheduled annual well gyn visit  Chief Complaint   Patient presents with    Gynecologic Exam     wwe       Myriad intake in the past?: yes  Changes in Family Hx since? NO    HPI:   39 y.o.   Social History     Substance and Sexual Activity   Sexual Activity Yes    Partners: Male    Birth control/protection: Tubal ligation       Menses:   q 28-30 days, lasts 5 days, changes products q 2-3 hrs on heaviest days.   Pain with menses:  None      PCP: does manage PMHx and preventative labs  History: PMHx, Meds, Allergies, PSHx, Social Hx, and POBHx all reviewed and updated.    Pt has no complaints today.    PHYSICAL EXAM:  /81   Pulse 80   Wt 88 kg (194 lb)   LMP 2025 (Exact Date)   Breastfeeding No   BMI 39.18 kg/m²  Not found.     Exam conducted with a chaperone present  General- NAD, alert and oriented, appropriate  Psych- Normal mood, good memory  Neck- No masses, no thyroid enlargement  CV- Regular rhythm, no murnurs  Resp- CTA to bases, no wheezes  Abdomen- Soft, non distended, non tender, no masses    Breast left-  Bilaterally symmetrical, no masses, non tender, no nipple discharge  Breast right- Bilaterally symmetrical, no masses, non tender, no nipple discharge    External genitalia- Normal female, no lesions  Urethra/meatus- Normal, no masses, non tender  Bladder- Normal, no masses, non tender  Vagina- Normal, no atrophy, no lesions, no discharge.  Prolapse : none noted   Cvx- Normal, no lesions, no discharge, No cervical motion tenderness  Uterus- Normal size, shape & consistency.  Non tender, mobile.  Adnexa- No mass, non tender  Anus/Rectum/Perineum- Not performed    Lymphatic- No palpable neck, axillary, or groin nodes  Ext- No edema, no cyanosis    Skin- No lesions, no rashes, no acanthosis nigricans      ASSESSMENT and PLAN:    Diagnoses and all orders for this visit:    1. Encounter for gynecological examination without abnormal finding (Primary)  -     IgP,  Aptima HPV  -     Mammo Screening Digital Tomosynthesis Bilateral With CAD; Future        Preventative:  BREAST HEALTH- Monthly self breast exam importance and how to reviewed. MMG and/or MRI (prn) reviewed per society guidelines and her individual history. Screen: Updated today  CERVICAL CANCER Screening- Reviewed current ASCCP guidelines for screening w and wo cotest HPV, age specific.  Screen: Updated today, per patient request  COLON CANCER Screening- Reviewed current medical society guidelines and options.  Screen:  Already up to date  SEXUAL HEALTH: Declines STD screening  VACCINATIONS Recommended: Covid vaccine, Flu annually.  Importance discussed, risk being unvaccinated reviewed.  Questions answered  Smoking status- NON SMOKER/VAPER        She understands the importance of having any ordered tests to be performed in a timely fashion.  The risks of not performing them include, but are not limited to, advanced cancer stages, bone loss from osteoporosis and/or subsequent increase in morbidity and/or mortality.  She is encouraged to review her results online and/or contact or office if she has questions.     Follow Up:  Return in about 1 year (around 1/10/2026) for Annual physical.        Gigi Reynolds, APRN  01/10/2025    [facility] [address] [facility]  [facility]  [address] [place] [address]  Dept: [phone]  Dept Fax: [phone]  Loc: [phone]

## 2025-01-14 LAB
CYTOLOGIST CVX/VAG CYTO: NORMAL
CYTOLOGY CVX/VAG DOC CYTO: NORMAL
CYTOLOGY CVX/VAG DOC THIN PREP: NORMAL
DX ICD CODE: NORMAL
HPV I/H RISK 4 DNA CVX QL PROBE+SIG AMP: NEGATIVE
Lab: NORMAL
OTHER STN SPEC: NORMAL
STAT OF ADQ CVX/VAG CYTO-IMP: NORMAL

## 2025-01-30 ENCOUNTER — HOSPITAL ENCOUNTER (OUTPATIENT)
Dept: MAMMOGRAPHY | Facility: HOSPITAL | Age: 40
Discharge: HOME | End: 2025-01-30
Admitting: NURSE PRACTITIONER
Payer: COMMERCIAL

## 2025-01-30 DIAGNOSIS — Z01.419 ENCOUNTER FOR GYNECOLOGICAL EXAMINATION WITHOUT ABNORMAL FINDING: ICD-10-CM

## 2025-01-30 PROCEDURE — 77063 BREAST TOMOSYNTHESIS BI: CPT

## 2025-01-30 PROCEDURE — 77067 SCR MAMMO BI INCL CAD: CPT

## 2025-02-03 DIAGNOSIS — R92.8 ABNORMAL MAMMOGRAM: Primary | ICD-10-CM

## 2025-02-28 ENCOUNTER — HOSPITAL ENCOUNTER (OUTPATIENT)
Dept: ULTRASOUND IMAGING | Facility: HOSPITAL | Age: 40
Discharge: HOME | End: 2025-02-28
Payer: COMMERCIAL

## 2025-02-28 ENCOUNTER — HOSPITAL ENCOUNTER (OUTPATIENT)
Dept: MAMMOGRAPHY | Facility: HOSPITAL | Age: 40
Discharge: HOME | End: 2025-02-28
Payer: COMMERCIAL

## 2025-02-28 DIAGNOSIS — R92.8 ABNORMAL MAMMOGRAM: ICD-10-CM

## 2025-02-28 PROCEDURE — 76642 ULTRASOUND BREAST LIMITED: CPT

## 2025-02-28 NOTE — TELEPHONE ENCOUNTER
Caller: Neha May    Relationship to patient: Self    Best call back number: [phone]    Patient is needing: PATIENT CALLED IN AND WOULD LIKE TO KNOW IF SHE COULD STEP UP DOSE ON Tirzepatide-Weight Management (ZEPBOUND) . PATIENT IS REQUESTING A CALL BACK WITH GUIDANCE PLEASE AND SAID IT IS OKAY TO LEAVE MESSAGE ON PHONE..      [facility] - [place], [place] - [address] [place] [address] - [phone]  - [phone]  [phone]

## 2025-03-03 ENCOUNTER — TELEPHONE (OUTPATIENT)
Dept: OBSTETRICS AND GYNECOLOGY | Facility: CLINIC | Age: 40
End: 2025-03-03
Payer: COMMERCIAL

## 2025-03-03 NOTE — TELEPHONE ENCOUNTER
----- Message from Gigi Reynolds sent at 3/3/2025 10:43 AM EST -----  Please let patient know follow up breast ultrasound is benign, radiology recommends annual screening mammogram in one year.

## 2025-03-14 ENCOUNTER — OFFICE VISIT (OUTPATIENT)
Dept: FAMILY MEDICINE CLINIC | Facility: CLINIC | Age: 40
End: 2025-03-14
Payer: COMMERCIAL

## 2025-03-14 VITALS
BODY MASS INDEX: 38.71 KG/M2 | SYSTOLIC BLOOD PRESSURE: 127 MMHG | HEART RATE: 70 BPM | TEMPERATURE: 98.9 F | WEIGHT: 192 LBS | DIASTOLIC BLOOD PRESSURE: 81 MMHG | HEIGHT: 59 IN | OXYGEN SATURATION: 99 %

## 2025-03-14 DIAGNOSIS — R21 RASH: ICD-10-CM

## 2025-03-14 DIAGNOSIS — B02.9 HERPES ZOSTER WITHOUT COMPLICATION: Primary | ICD-10-CM

## 2025-03-14 PROCEDURE — 99214 OFFICE O/P EST MOD 30 MIN: CPT

## 2025-03-14 RX ORDER — GABAPENTIN 300 MG/1
300 CAPSULE ORAL NIGHTLY
Qty: 7 CAPSULE | Refills: 0 | Status: SHIPPED | OUTPATIENT
Start: 2025-03-14

## 2025-03-14 RX ORDER — TRIAMCINOLONE ACETONIDE 1 MG/G
1 OINTMENT TOPICAL 2 TIMES DAILY
Qty: 30 G | Refills: 0 | Status: SHIPPED | OUTPATIENT
Start: 2025-03-14

## 2025-03-14 RX ORDER — VALACYCLOVIR HYDROCHLORIDE 1 G/1
1000 TABLET, FILM COATED ORAL 3 TIMES DAILY
Qty: 21 TABLET | Refills: 0 | Status: SHIPPED | OUTPATIENT
Start: 2025-03-14 | End: 2025-03-21

## 2025-03-14 NOTE — PROGRESS NOTES
Chief Complaint     Herpes Zoster (On face for 3wks, doesn't know what to do for treatment )    History of Present Illness     Neha May is a 39 y.o. female who presents to [facility] for evaluation of herpes zoster on her face for 3 weeks.  She did not have treatment. The shingles itself is clearing up but she has some neuropathy type pain in the area and needs treatment for that. There is also another spot that she feels might be forming because it feels similar in the area.        History      Past Medical History:   Diagnosis Date    Colon polyp     Have had colonoscopy bc of family histoey - polyps seen - no sognoficant fondings    Foot pain, left     GERD (gastroesophageal reflux disease)     Heel pain     Hypertension     Take a low dose of kedication daily    Numbness in feet        Past Surgical History:   Procedure Laterality Date     SECTION      COLONOSCOPY      COLONOSCOPY N/A 5/3/2024    Procedure: COLONOSCOPY WITH COLD SNARE POLYPECTOMY;  Surgeon: Amie Mcclure MD;  Location: [facility] ENDOSCOPY;  Service: Gastroenterology;  Laterality: N/A;  COLON POLYP, DIVERTICULOSIS    ENDOSCOPY N/A 5/3/2024    Procedure: ESOPHAGOGASTRODUODENOSCOPY WITH BIOPSIES AND 15-18MM BALLOON DILATATION;  Surgeon: Amie Mcclure MD;  Location: [facility] ENDOSCOPY;  Service: Gastroenterology;  Laterality: N/A;  SCHATZKI'S RING, HIATAL HERNIA    TUBAL ABDOMINAL LIGATION      UPPER GASTROINTESTINAL ENDOSCOPY      URETEROSCOPY LASER LITHOTRIPSY WITH STENT INSERTION Left 2024    Procedure: LEFT URETEROSCOPY LASER LITHOTRIPSY WITH STENT INSERTION;  Surgeon: Jose Darby MD;  Location: [facility] MAIN OR;  Service: Urology;  Laterality: Left;       Family History   Problem Relation Age of Onset    Hypertension Mother     Diabetes Mother     Hypertension Brother     Colon cancer Maternal Grandmother         45s    Melanoma Neg Hx     Uterine cancer  "Neg Hx     Ovarian cancer Neg Hx     Breast cancer Neg Hx     Prostate cancer Neg Hx     Deep vein thrombosis Neg Hx     Pulmonary embolism Neg Hx     Coronary artery disease Neg Hx     Stroke Neg Hx     Osteoporosis Neg Hx     Malig Hyperthermia Neg Hx         Current Medications        Current Outpatient Medications:     lisinopril (PRINIVIL,ZESTRIL) 10 MG tablet, Take 1 tablet by mouth Daily., Disp: 90 tablet, Rfl: 1    pantoprazole (PROTONIX) 20 MG EC tablet, Take 1 tablet by mouth Daily., Disp: 90 tablet, Rfl: 3    Tirzepatide-Weight Management (ZEPBOUND) 5 MG/0.5ML solution auto-injector, Inject 0.5 mL under the skin into the appropriate area as directed 1 (One) Time Per Week., Disp: 2 mL, Rfl: 0    vitamin D (ERGOCALCIFEROL) 1.25 MG (44641 UT) capsule capsule, Take 1 capsule by mouth 1 (One) Time Per Week., Disp: 12 capsule, Rfl: 1    gabapentin (NEURONTIN) 300 MG capsule, Take 1 capsule by mouth Every Night., Disp: 7 capsule, Rfl: 0    triamcinolone (KENALOG) 0.1 % ointment, Apply 1 Application topically to the appropriate area as directed 2 (Two) Times a Day., Disp: 30 g, Rfl: 0    valACYclovir (Valtrex) 1000 MG tablet, Take 1 tablet by mouth 3 (Three) Times a Day for 7 days., Disp: 21 tablet, Rfl: 0     Allergies     No Known Allergies    Social History       Social History     Social History Narrative    Not on file       Immunizations     Immunization:  Immunization History   Administered Date(s) Administered    COVID-19 (MODERNA) 1st,2nd,3rd Dose Monovalent 02/05/2021, 03/05/2021    Flu Vaccine Quad PF >36MO 10/14/2021, 09/26/2022    Fluzone  >6mos 09/23/2024    Fluzone (or Fluarix & Flulaval for VFC) >6mos 02/08/2024    Influenza, Unspecified 09/27/2022    Td (TDVAX) 08/17/2009          Objective     Objective     Vital Signs:   /81 (BP Location: Left arm, Patient Position: Sitting, Cuff Size: Adult)   Pulse 70   Temp 98.9 °F (37.2 °C) (Temporal)   Ht 149.9 cm (59\")   Wt 87.1 kg (192 lb)   " SpO2 99%   BMI 38.78 kg/m²       Physical Exam  Constitutional:       Appearance: Normal appearance.   HENT:      Nose: Nose normal.      Mouth/Throat:      Mouth: Mucous membranes are moist.   Cardiovascular:      Rate and Rhythm: Normal rate and regular rhythm.      Pulses: Normal pulses.      Heart sounds: Normal heart sounds.   Pulmonary:      Effort: Pulmonary effort is normal.      Breath sounds: Normal breath sounds.   Skin:     General: Skin is warm and dry.   Neurological:      General: No focal deficit present.      Mental Status: She is alert and oriented to person, place, and time.   Psychiatric:         Mood and Affect: Mood normal.         Behavior: Behavior normal.         Results    The following data was reviewed by: VICKIE Barboza on 03/14/2025:             Assessment and Plan        Assessment and Plan    Diagnoses and all orders for this visit:    1. Herpes zoster without complication (Primary)  -     valACYclovir (Valtrex) 1000 MG tablet; Take 1 tablet by mouth 3 (Three) Times a Day for 7 days.  Dispense: 21 tablet; Refill: 0  -     gabapentin (NEURONTIN) 300 MG capsule; Take 1 capsule by mouth Every Night.  Dispense: 7 capsule; Refill: 0    2. Rash  -     triamcinolone (KENALOG) 0.1 % ointment; Apply 1 Application topically to the appropriate area as directed 2 (Two) Times a Day.  Dispense: 30 g; Refill: 0            I spent 30 minutes caring for Neha on this date of service. This time includes time spent by me in the following activities:preparing for the visit, reviewing tests, performing a medically appropriate examination and/or evaluation , counseling and educating the patient/family/caregiver, ordering medications, tests, or procedures, and documenting information in the medical record  Follow Up        Follow Up   No follow-ups on file.  Patient was given instructions and counseling regarding her condition or for health maintenance advice. Please see specific information pulled  into the AVS if appropriate.

## 2025-04-14 RX ORDER — LISINOPRIL 10 MG/1
10 TABLET ORAL DAILY
Qty: 90 TABLET | Refills: 0 | Status: SHIPPED | OUTPATIENT
Start: 2025-04-14

## 2025-04-21 RX ORDER — TIRZEPATIDE 5 MG/.5ML
INJECTION, SOLUTION SUBCUTANEOUS
Qty: 2 ML | Refills: 0 | Status: SHIPPED | OUTPATIENT
Start: 2025-04-21

## 2025-05-05 ENCOUNTER — TELEPHONE (OUTPATIENT)
Dept: FAMILY MEDICINE CLINIC | Facility: CLINIC | Age: 40
End: 2025-05-05
Payer: COMMERCIAL

## 2025-05-05 ENCOUNTER — TELEPHONE (OUTPATIENT)
Dept: GASTROENTEROLOGY | Facility: CLINIC | Age: 40
End: 2025-05-05
Payer: COMMERCIAL

## 2025-05-05 NOTE — TELEPHONE ENCOUNTER
Caller: Neha May    Relationship: Self    Best call back number:   Telephone Information:   Mobile [phone]         What is the best time to reach you: ANYTIME     Who are you requesting to speak with (clinical staff, provider,  specific staff member): CLINICAL          What was the call regarding:       THE PATIENT SAID THE PA FOR THE ZEPBOUND WAS DENIED AND SHE IS WANTING TO KNOW IF PCP  SWETHA WANTS TO APPEAL OR TRY ANOTHER ALTERNATIVE

## 2025-05-05 NOTE — TELEPHONE ENCOUNTER
Pantoprazole PA requested via covermymeds.   (Key: [id number])  Approval received or 5/5/25 - 5/4/28.   Pt aware.

## 2025-05-06 NOTE — TELEPHONE ENCOUNTER
Zepbound was denied due to patient not losing enough while on medication.  She is asking if there is an alternative?  Can she try Wegovy?  Pended if ok.

## 2025-05-29 ENCOUNTER — TELEPHONE (OUTPATIENT)
Dept: FAMILY MEDICINE CLINIC | Facility: CLINIC | Age: 40
End: 2025-05-29
Payer: COMMERCIAL

## 2025-05-29 NOTE — TELEPHONE ENCOUNTER
Caller: Neha May    Relationship: Self    Best call back number: [phone]    What medication are you requesting: SEMAGLUTIDE       If a prescription is needed, what is your preferred pharmacy and phone number: [facility] - [place], [place] - [address] [facility] [phone] [facility] [phone] FX     Additional notes:  PATIENT IS NEEDING TO GO UP TO THE NEXT DOSAGE.

## 2025-05-30 RX ORDER — SEMAGLUTIDE 1 MG/.5ML
1 INJECTION, SOLUTION SUBCUTANEOUS WEEKLY
Qty: 2 ML | Refills: 3 | Status: SHIPPED | OUTPATIENT
Start: 2025-05-30

## 2025-06-11 ENCOUNTER — OFFICE VISIT (OUTPATIENT)
Dept: GASTROENTEROLOGY | Facility: CLINIC | Age: 40
End: 2025-06-11
Payer: COMMERCIAL

## 2025-06-11 VITALS
HEART RATE: 86 BPM | BODY MASS INDEX: 38.75 KG/M2 | WEIGHT: 192.2 LBS | SYSTOLIC BLOOD PRESSURE: 125 MMHG | DIASTOLIC BLOOD PRESSURE: 74 MMHG | HEIGHT: 59 IN

## 2025-06-11 DIAGNOSIS — Z86.0101 HISTORY OF ADENOMATOUS POLYP OF COLON: ICD-10-CM

## 2025-06-11 DIAGNOSIS — K22.2 SCHATZKI'S RING: ICD-10-CM

## 2025-06-11 DIAGNOSIS — K44.9 HIATAL HERNIA: ICD-10-CM

## 2025-06-11 DIAGNOSIS — K21.00 GASTROESOPHAGEAL REFLUX DISEASE WITH ESOPHAGITIS WITHOUT HEMORRHAGE: Primary | ICD-10-CM

## 2025-06-11 PROCEDURE — 99214 OFFICE O/P EST MOD 30 MIN: CPT | Performed by: NURSE PRACTITIONER

## 2025-06-11 RX ORDER — PANTOPRAZOLE SODIUM 20 MG/1
20 TABLET, DELAYED RELEASE ORAL
Qty: 180 TABLET | Refills: 3 | Status: SHIPPED | OUTPATIENT
Start: 2025-06-11

## 2025-06-11 NOTE — PROGRESS NOTES
Chief Complaint   Heartburn    History of Present Illness       Neha May is a 39 y.o. female who presents to [facility]STR[facility] for follow-up     History of Present Illness  The patient is a 39-year-old female who presents today for an office follow-up for GERD. She was last seen in the office on 06/11/2024. She has a history of GERD and has been taking Protonix 20 mg in the morning. She underwent EGD and colonoscopy with Dr. Mcclure on 05/13/2021. 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 A 1 cm transverse colon polyp was removed and a 12 mm rectal polyp was removed, nonbleeding internal hemorrhoids noted. Recall colonoscopy in 3 years, so next screening colonoscopy should have been done in 2024. Path positive for tubulovillous adenoma and hyperplastic polyp, mild chronic inactive gastritis, reflux esophagitis. She was on Zepbound at last visit. Reports a GI family history of maternal grandmother with colon cancer, mother with colon polyps. She then underwent EGD and colonoscopy with Dr. Kapadia on 05/03/2024. EGD showed a nonobstructing Schatzki ring, which was dilated and biopsied, small hiatal hernia. Colonoscopy showed mild diverticulosis. Colon polyp, path positive for sessile serrated lesion, gastritis, reflux esophagitis. Recall colonoscopy in 5 years puts recall at 2029. She does use Linzess as needed, had been doing well with Protonix.    She continues to take Protonix daily and reports no exacerbation of her symptoms since her last visit. However, she frequently experiences a sensation akin to a lump in her throat several times a week.    Her bowel movements have been regular, negating the need for Linzess.    She has been switched from Saxenda to Wegovy due to insurance issues. She started Wegovy 2 weeks ago and is currently on the lowest dose.    PAST SURGICAL  "HISTORY:  EGD and colonoscopy on 05/13/2021 and 05/03/2024.    FAMILY HISTORY  - Maternal grandmother: Colon cancer  - Mother: Colon polyps          Results       Result Review :       CMP          8/28/2024    04:49 8/29/2024    05:13 8/30/2024    04:51   CMP   Glucose 112  140  110    BUN 8  7  5    Creatinine 0.69  0.64  0.62    EGFR 112.7  114.7  115.6    Sodium 134  134  136    Potassium 3.7  3.2  3.5    Chloride 101  102  103    Calcium 8.2  8.1  8.6    BUN/Creatinine Ratio 11.6  10.9  8.1    Anion Gap 10.4  10.0  10.6      CBC          8/28/2024    04:49 8/29/2024    05:13 8/30/2024    04:51   CBC   WBC 11.67  11.28  8.21    RBC 3.89  3.39  3.31    Hemoglobin 11.6  10.0  9.8    Hematocrit 35.6  30.5  29.4    MCV 91.5  90.0  88.8    MCH 29.8  29.5  29.6    MCHC 32.6  32.8  33.3    RDW 12.5  12.3  12.7    Platelets 224  225  261      CBC w/diff          8/28/2024    04:49 8/29/2024    05:13 8/30/2024    04:51   CBC w/Diff   WBC 11.67  11.28  8.21    RBC 3.89  3.39  3.31    Hemoglobin 11.6  10.0  9.8    Hematocrit 35.6  30.5  29.4    MCV 91.5  90.0  88.8    MCH 29.8  29.5  29.6    MCHC 32.6  32.8  33.3    RDW 12.5  12.3  12.7    Platelets 224  225  261    Neutrophil Rel % 76.4  67.2  59.6    Immature Granulocyte Rel % 0.5  0.5  0.4    Lymphocyte Rel % 12.8  19.0  26.8    Monocyte Rel % 9.6  11.6  10.5    Eosinophil Rel % 0.4  1.3  2.3    Basophil Rel % 0.3  0.4  0.4          Electrolytes          8/28/2024    04:49 8/29/2024    05:13 8/30/2024    04:51   Electrolytes   Sodium 134  134  136    Potassium 3.7  3.2  3.5    Chloride 101  102  103    Calcium 8.2  8.1  8.6      Renal Profile          8/28/2024    04:49 8/29/2024    05:13 8/30/2024    04:51   Renal Profile   BUN 8  7  5    Creatinine 0.69  0.64  0.62        Lipase   Lipase   Date Value Ref Range Status   08/20/2024 40 13 - 60 U/L Final     Amylase No results found for: \"AMYLASE\"  Iron Profile No results found for: \"IRON\", \"TIBC\", \"LABIRON\", " "\"TRANSFERRIN\"  Ferritin No results found for: \"FERRITIN\"  ESR (Sed Rate) No results found for: \"SEDRATE\"  CRP (C-Reactive) No results found for: \"CRP\"  Liver Workup   Protime   Date Value Ref Range Status   10/24/2019 10.6 9.4 - 12.0 s Final     INR   Date Value Ref Range Status   10/24/2019 0.99 (L) 2.00 - 3.00 NA Final     Comment:     Suggested Therapeutic Ranges For Oral Anticoagulant Therapy:  Level of Therapy                      INR Target Range  Standard Dose                            2.0-3.0  High Dose                                2.5-3.5  Patients not receiving anticoagulant  Therapy Normal Range                     0.6-1.2       Acute HEP Panel   Hepatitis C Ab   Date Value Ref Range Status   2024 Non-Reactive Non-Reactive Final               Results  Diagnostic Testing   - EGD: 2021, Duodenitis, hiatal hernia, gastritis, 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   - Colonoscopy: 2021, 1 cm transverse colon polyp removed, 12 mm rectal polyp removed, nonbleeding internal hemorrhoids.   - EGD: 2024, Nonobstructing Schatzki ring, small hiatal hernia.   - Colonoscopy: 2024, Mild diverticulosis.      Past Medical History       Past Medical History:   Diagnosis Date    Colon polyp     Have had colonoscopy bc of family histoey - polyps seen - no sognoficant fondings    Foot pain, left     GERD (gastroesophageal reflux disease)     Heel pain     Hypertension     Take a low dose of kedication daily    Numbness in feet        Past Surgical History:   Procedure Laterality Date     SECTION      COLONOSCOPY      COLONOSCOPY N/A 5/3/2024    Procedure: COLONOSCOPY WITH COLD SNARE POLYPECTOMY;  Surgeon: Amie Mcclure MD;  Location: [facility] ENDOSCOPY;  Service: Gastroenterology;  Laterality: N/A;  COLON POLYP, DIVERTICULOSIS    ENDOSCOPY N/A 5/3/2024    Procedure: ESOPHAGOGASTRODUODENOSCOPY WITH BIOPSIES AND 15-18MM BALLOON DILATATION;  Surgeon: " Amie Mcclure MD;  Location: [facility] ENDOSCOPY;  Service: Gastroenterology;  Laterality: N/A;  SCHATZKI'S RING, HIATAL HERNIA    TUBAL ABDOMINAL LIGATION  2008    UPPER GASTROINTESTINAL ENDOSCOPY      URETEROSCOPY LASER LITHOTRIPSY WITH STENT INSERTION Left 8/22/2024    Procedure: LEFT URETEROSCOPY LASER LITHOTRIPSY WITH STENT INSERTION;  Surgeon: Jose Darby MD;  Location: [facility] MAIN OR;  Service: Urology;  Laterality: Left;         Current Outpatient Medications:     lisinopril (PRINIVIL,ZESTRIL) 10 MG tablet, TAKE 1 TABLET BY MOUTH ONCE DAILY FOR BLOOD PRESSURE, Disp: 90 tablet, Rfl: 0    pantoprazole (PROTONIX) 20 MG EC tablet, Take 1 tablet by mouth 2 (Two) Times a Day Before Meals., Disp: 180 tablet, Rfl: 3    Semaglutide-Weight Management (Wegovy) 1 MG/0.5ML solution auto-injector, Inject 0.5 mL under the skin into the appropriate area as directed 1 (One) Time Per Week., Disp: 2 mL, Rfl: 3    vitamin D (ERGOCALCIFEROL) 1.25 MG (08748 UT) capsule capsule, Take 1 capsule by mouth 1 (One) Time Per Week., Disp: 12 capsule, Rfl: 1    gabapentin (NEURONTIN) 300 MG capsule, Take 1 capsule by mouth Every Night. (Patient not taking: Reported on 6/11/2025), Disp: 7 capsule, Rfl: 0    triamcinolone (KENALOG) 0.1 % ointment, Apply 1 Application topically to the appropriate area as directed 2 (Two) Times a Day. (Patient not taking: Reported on 6/11/2025), Disp: 30 g, Rfl: 0     No Known Allergies    Family History   Problem Relation Age of Onset    Hypertension Mother     Diabetes Mother     Hypertension Brother     Colon cancer Maternal Grandmother         45s    Melanoma Neg Hx     Uterine cancer Neg Hx     Ovarian cancer Neg Hx     Breast cancer Neg Hx     Prostate cancer Neg Hx     Deep vein thrombosis Neg Hx     Pulmonary embolism Neg Hx     Coronary artery disease Neg Hx     Stroke Neg Hx     Osteoporosis Neg Hx     Malig Hyperthermia Neg Hx         Social History     Social History Narrative    Not  "on file       Objective       Review of Systems   Constitutional:  Negative for appetite change, fatigue, fever, unexpected weight gain and unexpected weight loss.   HENT:  Negative for trouble swallowing.    Respiratory:  Negative for cough, choking, chest tightness, shortness of breath, wheezing and stridor.    Cardiovascular:  Negative for chest pain, palpitations and leg swelling.   Gastrointestinal:  Positive for GERD and indigestion. Negative for abdominal distention, abdominal pain, anal bleeding, blood in stool, constipation, diarrhea, nausea, rectal pain and vomiting.        Vital Signs:   /74 (BP Location: Left arm, Patient Position: Sitting, Cuff Size: Adult)   Pulse 86   Ht 149.9 cm (59.02\")   Wt 87.2 kg (192 lb 3.2 oz)   BMI 38.80 kg/m²       Physical Exam  Constitutional:       General: She is not in acute distress.     Appearance: She is well-developed. She is not ill-appearing.   HENT:      Head: Normocephalic.   Eyes:      Pupils: Pupils are equal, round, and reactive to light.   Cardiovascular:      Rate and Rhythm: Normal rate and regular rhythm.      Heart sounds: Normal heart sounds.   Pulmonary:      Effort: Pulmonary effort is normal.      Breath sounds: Normal breath sounds.   Abdominal:      General: Bowel sounds are normal. There is no distension.      Palpations: Abdomen is soft. There is no mass.      Tenderness: There is no abdominal tenderness. There is no guarding or rebound.      Hernia: No hernia is present.   Musculoskeletal:         General: Normal range of motion.   Skin:     General: Skin is warm and dry.   Neurological:      Mental Status: She is alert and oriented to person, place, and time.   Psychiatric:         Speech: Speech normal.         Behavior: Behavior normal.         Judgment: Judgment normal.         Physical Exam          Assessment & Plan          Assessment and Plan    Diagnoses and all orders for this visit:    1. Gastroesophageal reflux disease with " esophagitis without hemorrhage (Primary)  -     pantoprazole (PROTONIX) 20 MG EC tablet; Take 1 tablet by mouth 2 (Two) Times a Day Before Meals.  Dispense: 180 tablet; Refill: 3    2. Hiatal hernia    3. Schatzki's ring    4. History of adenomatous polyp of colon        Assessment & Plan  1. Gastroesophageal Reflux Disease:  - Protonix 20 mg twice daily, once before breakfast and once before dinner or bedtime, on an empty stomach.  - Prescription for a 90-day supply of Protonix 20 mg twice daily sent to pharmacy.    2. Weight management:  - Switched from Saxenda to Wegovy due to insurance reasons.  - Started the lowest dose of Wegovy 2 weeks ago.    3. Constipation:  - No recent use of Linzess.  - Inform the office if Linzess is needed as Wegovy dose increases for refill or samples to prevent constipation.    Follow-up: 12/2025.          Follow Up       Follow Up   Return in about 6 months (around 12/11/2025) for GERD.  Patient was given instructions and counseling regarding her condition or for health maintenance advice. Please see specific information pulled into the AVS if appropriate.       Patient or patient representative verbalized consent for the use of Ambient Listening during the visit with  VICKIE Blair for chart documentation. 6/11/2025  10:47 EDT

## 2025-06-12 ENCOUNTER — TELEPHONE (OUTPATIENT)
Dept: FAMILY MEDICINE CLINIC | Facility: CLINIC | Age: 40
End: 2025-06-12

## 2025-06-12 NOTE — TELEPHONE ENCOUNTER
Caller: Neha May    Relationship: Self    Best call back number: [phone]    What medication are you requesting: WEGOVY   0.5 MG      If a prescription is needed, what is your preferred pharmacy and phone number: [facility] - [place], [place] - [address] - [phone]  - [phone] FX     Additional notes:  PRESCRIPTION WAS SENT FOR 1 MG AND PATIENT IS NOT READY FOR THAT INCREASE SHE JUST FINISHED THE 0.25 MG    PLEASE NOTIFY WHEN NEW PRESCRIPTION IS SENT

## 2025-06-13 ENCOUNTER — OFFICE VISIT (OUTPATIENT)
Dept: FAMILY MEDICINE CLINIC | Facility: CLINIC | Age: 40
End: 2025-06-13
Payer: COMMERCIAL

## 2025-06-13 VITALS
HEIGHT: 59 IN | TEMPERATURE: 98 F | OXYGEN SATURATION: 97 % | RESPIRATION RATE: 17 BRPM | HEART RATE: 63 BPM | DIASTOLIC BLOOD PRESSURE: 72 MMHG | SYSTOLIC BLOOD PRESSURE: 116 MMHG | WEIGHT: 193.8 LBS | BODY MASS INDEX: 39.07 KG/M2

## 2025-06-13 DIAGNOSIS — I10 BENIGN ESSENTIAL HTN: Primary | Chronic | ICD-10-CM

## 2025-06-13 DIAGNOSIS — H61.22 IMPACTED CERUMEN OF LEFT EAR: ICD-10-CM

## 2025-06-13 DIAGNOSIS — H92.02 LEFT EAR PAIN: ICD-10-CM

## 2025-06-13 DIAGNOSIS — E66.812 CLASS 2 SEVERE OBESITY DUE TO EXCESS CALORIES WITH SERIOUS COMORBIDITY IN ADULT, UNSPECIFIED BMI: ICD-10-CM

## 2025-06-13 DIAGNOSIS — E66.01 CLASS 2 SEVERE OBESITY DUE TO EXCESS CALORIES WITH SERIOUS COMORBIDITY IN ADULT, UNSPECIFIED BMI: ICD-10-CM

## 2025-06-13 PROBLEM — K21.9 GASTROESOPHAGEAL REFLUX DISEASE WITHOUT ESOPHAGITIS: Chronic | Status: RESOLVED | Noted: 2022-01-14 | Resolved: 2025-06-13

## 2025-06-13 RX ORDER — SEMAGLUTIDE 0.5 MG/.5ML
0.5 INJECTION, SOLUTION SUBCUTANEOUS WEEKLY
Qty: 2 ML | Refills: 3 | Status: SHIPPED | OUTPATIENT
Start: 2025-06-13

## 2025-06-13 NOTE — ASSESSMENT & PLAN NOTE
- Ear discomfort likely due to cerumen impaction causing inflammation.  - Ear irrigation performed, resulting in significant improvement.  - Advised to contact the clinic for a prescription of otic drops if pain recurs.  - Ears re-evaluated post-irrigation, showing no signs of inflammation.

## 2025-06-13 NOTE — PROGRESS NOTES
Chief Complaint     Earache (Pt states that she is having left ear pain, she states that feels like it is draining. Started 2 weeks ago she tried swimmers ear drops has helped but 4 days ago it has worsened. )    History of Present Illness     Neha May is a 39 y.o. female who presents to [facility] for evaluation of .          History of Present Illness  The patient is a 39-year-old female who presents for evaluation of ear pain.    She reports intermittent ear discomfort, which she initially attributed to swimmer's ear due to her recent activities involving swimming in a pond and a pool. She also mentions a sensation of drainage from the affected ear.    She is on Wegovy 0.25 mg and needs a dose adjustment to 0.5 mg weekly.          History      Past Medical History:   Diagnosis Date    Colon polyp     Have had colonoscopy bc of family histoey - polyps seen - no sognoficant fondings    Foot pain, left     GERD (gastroesophageal reflux disease)     Heel pain     Hypertension     Take a low dose of kedication daily    Numbness in feet        Past Surgical History:   Procedure Laterality Date     SECTION      COLONOSCOPY      COLONOSCOPY N/A 5/3/2024    Procedure: COLONOSCOPY WITH COLD SNARE POLYPECTOMY;  Surgeon: Amie Mcclure MD;  Location: [facility] ENDOSCOPY;  Service: Gastroenterology;  Laterality: N/A;  COLON POLYP, DIVERTICULOSIS    ENDOSCOPY N/A 5/3/2024    Procedure: ESOPHAGOGASTRODUODENOSCOPY WITH BIOPSIES AND 15-18MM BALLOON DILATATION;  Surgeon: Amie Mcclure MD;  Location: [facility] ENDOSCOPY;  Service: Gastroenterology;  Laterality: N/A;  SCHATZKI'S RING, HIATAL HERNIA    TUBAL ABDOMINAL LIGATION      UPPER GASTROINTESTINAL ENDOSCOPY      URETEROSCOPY LASER LITHOTRIPSY WITH STENT INSERTION Left 2024    Procedure: LEFT URETEROSCOPY LASER LITHOTRIPSY WITH STENT INSERTION;  Surgeon: Jose Darby MD;  Location: [facility]  "MAIN OR;  Service: Urology;  Laterality: Left;       Family History   Problem Relation Age of Onset    Hypertension Mother     Diabetes Mother     Hypertension Brother     Colon cancer Maternal Grandmother         45s    Melanoma Neg Hx     Uterine cancer Neg Hx     Ovarian cancer Neg Hx     Breast cancer Neg Hx     Prostate cancer Neg Hx     Deep vein thrombosis Neg Hx     Pulmonary embolism Neg Hx     Coronary artery disease Neg Hx     Stroke Neg Hx     Osteoporosis Neg Hx     Malig Hyperthermia Neg Hx         Current Medications        Current Outpatient Medications:     lisinopril (PRINIVIL,ZESTRIL) 10 MG tablet, TAKE 1 TABLET BY MOUTH ONCE DAILY FOR BLOOD PRESSURE, Disp: 90 tablet, Rfl: 0    pantoprazole (PROTONIX) 20 MG EC tablet, Take 1 tablet by mouth 2 (Two) Times a Day Before Meals., Disp: 180 tablet, Rfl: 3    vitamin D (ERGOCALCIFEROL) 1.25 MG (78547 UT) capsule capsule, Take 1 capsule by mouth 1 (One) Time Per Week., Disp: 12 capsule, Rfl: 1    Semaglutide-Weight Management (Wegovy) 0.5 MG/0.5ML solution auto-injector, Inject 0.5 mL under the skin into the appropriate area as directed 1 (One) Time Per Week., Disp: 2 mL, Rfl: 3     Allergies     No Known Allergies    Social History       Social History     Social History Narrative    Not on file       Immunizations     Immunization:  Immunization History   Administered Date(s) Administered    COVID-19 (MODERNA) 1st,2nd,3rd Dose Monovalent 02/05/2021, 03/05/2021    Flu Vaccine Quad PF >36MO 10/14/2021, 09/26/2022    Fluzone  >6mos 09/23/2024    Fluzone (or Fluarix & Flulaval for VFC) >6mos 02/08/2024    Influenza, Unspecified 09/27/2022    Td (TDVAX) 08/17/2009          Objective     Objective     Vital Signs:   /72   Pulse 63   Temp 98 °F (36.7 °C) (Temporal)   Resp 17   Ht 149.9 cm (59.02\")   Wt 87.9 kg (193 lb 12.8 oz)   SpO2 97%   BMI 39.12 kg/m²       Physical Exam  Vitals reviewed.   Constitutional:       General: She is not in acute " distress.  HENT:      Head: Normocephalic.      Right Ear: Tympanic membrane normal.      Left Ear: Tympanic membrane normal. There is impacted cerumen.      Nose: Nose normal.      Mouth/Throat:      Pharynx: Oropharynx is clear. No posterior oropharyngeal erythema.   Eyes:      General: No scleral icterus.     Extraocular Movements: Extraocular movements intact.      Conjunctiva/sclera: Conjunctivae normal.      Pupils: Pupils are equal, round, and reactive to light.   Cardiovascular:      Rate and Rhythm: Normal rate and regular rhythm.      Pulses: Normal pulses.      Heart sounds: Normal heart sounds.   Pulmonary:      Effort: Pulmonary effort is normal.      Breath sounds: Normal breath sounds.   Abdominal:      General: Bowel sounds are normal.      Palpations: Abdomen is soft.   Musculoskeletal:         General: Normal range of motion.      Cervical back: Neck supple.   Skin:     General: Skin is warm and dry.   Neurological:      Mental Status: She is alert and oriented to person, place, and time.   Psychiatric:         Mood and Affect: Mood normal.         Behavior: Behavior normal.         Thought Content: Thought content normal.         Judgment: Judgment normal.         Physical Exam  Ears: Left ear appears slightly cloudy and inflamed, with some wax present. Right ear appears normal with a small amount of wax.      Results      Result Review :   The following data was reviewed by: VICKIE Burks on 06/13/2025:  Common labs          8/28/2024    04:49 8/29/2024    05:13 8/30/2024    04:51   Common Labs   Glucose 112  140  110    BUN 8  7  5    Creatinine 0.69  0.64  0.62    Sodium 134  134  136    Potassium 3.7  3.2  3.5    Chloride 101  102  103    Calcium 8.2  8.1  8.6    WBC 11.67  11.28  8.21    Hemoglobin 11.6  10.0  9.8    Hematocrit 35.6  30.5  29.4    Platelets 224  225  261      Consultant notes gastro    Results        Ear Cerumen Removal    Date/Time: 6/13/2025 11:42  AM    Performed by: Meagan Castellanos MA  Authorized by: Rachael Waters APRN    Anesthesia:  Local Anesthetic: none  Ceruminolytics applied: Ceruminolytics applied prior to the procedure.  Location details: left ear  Patient tolerance: patient tolerated the procedure well with no immediate complications  Procedure type: irrigation   Sedation:  Patient sedated: no          Assessment and Plan        Assessment and Plan    Diagnoses and all orders for this visit:    1. Benign essential HTN (Primary)  Assessment & Plan:  Hypertension is improving with treatment.  Continue current treatment regimen.  Dietary sodium restriction.  Blood pressure will be reassessed at the next regular appointment.      2. Class 2 severe obesity due to excess calories with serious comorbidity in adult, unspecified BMI  Assessment & Plan:  Patient's (Body mass index is 39.12 kg/m².) indicates that they are morbidly/severely obese (BMI > 40 or > 35 with obesity - related health condition) with health conditions that include hypertension . Weight is unchanged. BMI  is above average; BMI management plan is completed. We discussed portion control, increasing exercise, and pharmacologic options including wegovy.       3. Left ear pain  Assessment & Plan:  - Ear discomfort likely due to cerumen impaction causing inflammation.  - Ear irrigation performed, resulting in significant improvement.  - Advised to contact the clinic for a prescription of otic drops if pain recurs.  - Ears re-evaluated post-irrigation, showing no signs of inflammation.      Other orders  -     Semaglutide-Weight Management (Wegovy) 0.5 MG/0.5ML solution auto-injector; Inject 0.5 mL under the skin into the appropriate area as directed 1 (One) Time Per Week.  Dispense: 2 mL; Refill: 3  -     Ear Cerumen Removal        Assessment & Plan  1. Otalgia.      2. Medication management.  PROCEDURE  Procedure: Ear Irrigation, Right Ear    All questions were answered and  agreement to proceed was given after the following Pre-Procedure details were reviewed:  - Risks and Benefits: Discussed potential for discomfort during irrigation, benefits of improved ear canal visualization and relief from symptoms.  - Alternative Options: Discussed use of ear drops as an alternative to irrigation.  - Side effects: Potential for temporary discomfort or dizziness.  - Consent: Verbal consent obtained.    Intra-Procedure:  - Site Preparation: Examination of the ear canal prior to irrigation.    Post-Procedure:  - Tolerance Level: Patient tolerated the procedure well.  - Home Care Instructions: Advised to monitor for any recurring symptoms and to contact the office if ear pain persists or worsens.        Follow Up        Follow Up   No follow-ups on file.  Patient was given instructions and counseling regarding her condition or for health maintenance advice. Please see specific information pulled into the AVS if appropriate.      Patient or patient representative verbalized consent for the use of Ambient Listening during the visit with  VICKIE Burks for chart documentation. 6/13/2025  09:51 EDT

## 2025-06-13 NOTE — ASSESSMENT & PLAN NOTE
Patient's (Body mass index is 39.12 kg/m².) indicates that they are morbidly/severely obese (BMI > 40 or > 35 with obesity - related health condition) with health conditions that include hypertension . Weight is unchanged. BMI  is above average; BMI management plan is completed. We discussed portion control, increasing exercise, and pharmacologic options including wegovy.

## 2025-06-30 ENCOUNTER — TELEPHONE (OUTPATIENT)
Dept: FAMILY MEDICINE CLINIC | Facility: CLINIC | Age: 40
End: 2025-06-30
Payer: COMMERCIAL

## 2025-06-30 NOTE — TELEPHONE ENCOUNTER
Caller: Neha May    Relationship: Self    Best call back number: [phone]     What is the best time to reach you: ANYTIME     Who are you requesting to speak with (clinical staff, provider,  specific staff member): CLINICAL     What was the call regarding: PATIENT IS CALLING WITH CLINICAL QUESTIONS AND MISSED DOSAGE OF MEDICATION FOR     Semaglutide-Weight Management (Wegovy) 0.5 MG/0.5ML solution auto-injector

## 2025-07-22 ENCOUNTER — TELEPHONE (OUTPATIENT)
Dept: FAMILY MEDICINE CLINIC | Facility: CLINIC | Age: 40
End: 2025-07-22
Payer: COMMERCIAL

## 2025-07-22 DIAGNOSIS — Z13.29 SCREENING FOR THYROID DISORDER: ICD-10-CM

## 2025-07-22 DIAGNOSIS — Z13.1 SCREENING FOR DIABETES MELLITUS: ICD-10-CM

## 2025-07-22 DIAGNOSIS — I10 BENIGN ESSENTIAL HTN: Primary | Chronic | ICD-10-CM

## 2025-07-22 DIAGNOSIS — E55.9 VITAMIN D DEFICIENCY: Chronic | ICD-10-CM

## 2025-07-22 DIAGNOSIS — Z13.220 SCREENING FOR LIPID DISORDERS: ICD-10-CM

## 2025-07-22 NOTE — TELEPHONE ENCOUNTER
Caller: Neha May    Relationship: Self    Best call back number: [phone]     What is the best time to reach you: ANY     Who are you requesting to speak with (clinical staff, provider,  specific staff member): CLINICAL     What was the call regarding: CALLER STATED THAT SHE IS NEEDING TO KNOW THE LAST TIME SHE HAD LABS AND WANTING TO DISCUSS GETTING A FULL PANEL OF LABS     Is it okay if the provider responds through MyChart: NO

## 2025-07-23 ENCOUNTER — LAB (OUTPATIENT)
Dept: LAB | Facility: HOSPITAL | Age: 40
End: 2025-07-23
Payer: COMMERCIAL

## 2025-07-23 DIAGNOSIS — Z13.220 SCREENING FOR LIPID DISORDERS: ICD-10-CM

## 2025-07-23 DIAGNOSIS — I10 BENIGN ESSENTIAL HTN: Chronic | ICD-10-CM

## 2025-07-23 DIAGNOSIS — Z13.1 SCREENING FOR DIABETES MELLITUS: ICD-10-CM

## 2025-07-23 DIAGNOSIS — E55.9 VITAMIN D DEFICIENCY: Chronic | ICD-10-CM

## 2025-07-23 DIAGNOSIS — Z13.29 SCREENING FOR THYROID DISORDER: ICD-10-CM

## 2025-07-23 LAB
25(OH)D3 SERPL-MCNC: 44.7 NG/ML (ref 30–100)
ALBUMIN SERPL-MCNC: 4.1 G/DL (ref 3.5–5.2)
ALBUMIN/GLOB SERPL: 1.3 G/DL
ALP SERPL-CCNC: 64 U/L (ref 39–117)
ALT SERPL W P-5'-P-CCNC: 13 U/L (ref 1–33)
ANION GAP SERPL CALCULATED.3IONS-SCNC: 12.1 MMOL/L (ref 5–15)
AST SERPL-CCNC: 17 U/L (ref 1–32)
BILIRUB SERPL-MCNC: 0.7 MG/DL (ref 0–1.2)
BUN SERPL-MCNC: 10 MG/DL (ref 6–20)
BUN/CREAT SERPL: 12 (ref 7–25)
CALCIUM SPEC-SCNC: 9.3 MG/DL (ref 8.6–10.5)
CHLORIDE SERPL-SCNC: 104 MMOL/L (ref 98–107)
CHOLEST SERPL-MCNC: 153 MG/DL (ref 0–200)
CO2 SERPL-SCNC: 20.9 MMOL/L (ref 22–29)
CREAT SERPL-MCNC: 0.83 MG/DL (ref 0.57–1)
DEPRECATED RDW RBC AUTO: 40.2 FL (ref 37–54)
EGFRCR SERPLBLD CKD-EPI 2021: 91.5 ML/MIN/1.73
ERYTHROCYTE [DISTWIDTH] IN BLOOD BY AUTOMATED COUNT: 11.8 % (ref 12.3–15.4)
GLOBULIN UR ELPH-MCNC: 3.1 GM/DL
GLUCOSE SERPL-MCNC: 84 MG/DL (ref 65–99)
HBA1C MFR BLD: 5.1 % (ref 4.8–5.6)
HCT VFR BLD AUTO: 44.2 % (ref 34–46.6)
HDLC SERPL-MCNC: 52 MG/DL (ref 40–60)
HGB BLD-MCNC: 14.6 G/DL (ref 12–15.9)
LDLC SERPL CALC-MCNC: 87 MG/DL (ref 0–100)
LDLC/HDLC SERPL: 1.67 {RATIO}
MCH RBC QN AUTO: 30.9 PG (ref 26.6–33)
MCHC RBC AUTO-ENTMCNC: 33 G/DL (ref 31.5–35.7)
MCV RBC AUTO: 93.4 FL (ref 79–97)
PLATELET # BLD AUTO: 277 10*3/MM3 (ref 140–450)
PMV BLD AUTO: 10.9 FL (ref 6–12)
POTASSIUM SERPL-SCNC: 4.2 MMOL/L (ref 3.5–5.2)
PROT SERPL-MCNC: 7.2 G/DL (ref 6–8.5)
RBC # BLD AUTO: 4.73 10*6/MM3 (ref 3.77–5.28)
SODIUM SERPL-SCNC: 137 MMOL/L (ref 136–145)
T4 FREE SERPL-MCNC: 1.31 NG/DL (ref 0.92–1.68)
TRIGL SERPL-MCNC: 71 MG/DL (ref 0–150)
TSH SERPL DL<=0.05 MIU/L-ACNC: 0.83 UIU/ML (ref 0.27–4.2)
VLDLC SERPL-MCNC: 14 MG/DL (ref 5–40)
WBC NRBC COR # BLD AUTO: 7.3 10*3/MM3 (ref 3.4–10.8)

## 2025-07-23 PROCEDURE — 84439 ASSAY OF FREE THYROXINE: CPT

## 2025-07-23 PROCEDURE — 80050 GENERAL HEALTH PANEL: CPT

## 2025-07-23 PROCEDURE — 83036 HEMOGLOBIN GLYCOSYLATED A1C: CPT

## 2025-07-23 PROCEDURE — 36415 COLL VENOUS BLD VENIPUNCTURE: CPT

## 2025-07-23 PROCEDURE — 80061 LIPID PANEL: CPT

## 2025-07-23 PROCEDURE — 82306 VITAMIN D 25 HYDROXY: CPT

## 2025-08-26 ENCOUNTER — TELEPHONE (OUTPATIENT)
Dept: FAMILY MEDICINE CLINIC | Facility: CLINIC | Age: 40
End: 2025-08-26
Payer: COMMERCIAL

## 2025-08-26 DIAGNOSIS — I10 BENIGN ESSENTIAL HTN: Primary | ICD-10-CM

## (undated) DEVICE — BASIC SINGLE BASIN-LF: Brand: MEDLINE INDUSTRIES, INC.

## (undated) DEVICE — SOLIDIFIER LIQLOC PLS 1500CC BT

## (undated) DEVICE — GW ZIPWIRE STD/SHFT STR TPR/3CM .038IN 150CM

## (undated) DEVICE — Device

## (undated) DEVICE — BLCK/BITE BLOX WO/DENTL/RIM W/STRAP 54F

## (undated) DEVICE — SINGLE-USE BIOPSY FORCEPS: Brand: RADIAL JAW 4

## (undated) DEVICE — GLOVE,SURG,SENSICARE SLT,LF,PF,8: Brand: MEDLINE

## (undated) DEVICE — Y-TYPE TUR/BLADDER IRRIGATION SET, REGULATING CLAMP

## (undated) DEVICE — SYS IRR PUMP SGL ACTN VAC SYR 10CC

## (undated) DEVICE — CATH 2L URETRL HC 6F 50CM

## (undated) DEVICE — DEV INFL CRE STERIFLATE 60CC DISP

## (undated) DEVICE — URETERAL ACCESS SHEATH SET: Brand: NAVIGATOR HD

## (undated) DEVICE — TOWEL,OR,DSP,ST,BLUE,STD,4/PK,20PK/CS: Brand: MEDLINE

## (undated) DEVICE — ENDOSCOPIC VALVE WITH ADAPTER.: Brand: SURSEAL® II

## (undated) DEVICE — LINER SURG CANSTR SXN S/RIGD 1500CC

## (undated) DEVICE — THE SINGLE USE ETRAP – POLYP TRAP IS USED FOR SUCTION RETRIEVAL OF ENDOSCOPICALLY REMOVED POLYPS.: Brand: ETRAP

## (undated) DEVICE — NITINOL STONE RETRIEVAL BASKET: Brand: ESCAPE

## (undated) DEVICE — FIBR LASR HOLMIUM COMPAT 272MH DISP

## (undated) DEVICE — CYSTO PACK: Brand: MEDLINE INDUSTRIES, INC.

## (undated) DEVICE — ESOPHAGEAL BALLOON DILATATION CATHETER: Brand: CRE FIXED WIRE

## (undated) DEVICE — SNAR POLYP CAPTIFLEX XS/OVL 11X2.4MM 240CM 1P/U

## (undated) DEVICE — Device: Brand: DEFENDO AIR/WATER/SUCTION AND BIOPSY VALVE

## (undated) DEVICE — SOL IRR NACL 0.9PCT 3000ML

## (undated) DEVICE — CONN JET HYDRA H20 AUXILIARY DISP

## (undated) DEVICE — GW PTFE FIX/CORE STFF STR .038 3X150CM

## (undated) DEVICE — SOL IRRG H2O PL/BG 1000ML STRL

## (undated) DEVICE — SKIN PREP TRAY W/CHG: Brand: MEDLINE INDUSTRIES, INC.